# Patient Record
Sex: MALE | Race: WHITE | NOT HISPANIC OR LATINO | Employment: OTHER | URBAN - METROPOLITAN AREA
[De-identification: names, ages, dates, MRNs, and addresses within clinical notes are randomized per-mention and may not be internally consistent; named-entity substitution may affect disease eponyms.]

---

## 2017-01-16 ENCOUNTER — ALLSCRIPTS OFFICE VISIT (OUTPATIENT)
Dept: OTHER | Facility: OTHER | Age: 47
End: 2017-01-16

## 2017-01-17 ENCOUNTER — GENERIC CONVERSION - ENCOUNTER (OUTPATIENT)
Dept: OTHER | Facility: OTHER | Age: 47
End: 2017-01-17

## 2017-04-24 ENCOUNTER — GENERIC CONVERSION - ENCOUNTER (OUTPATIENT)
Dept: OTHER | Facility: OTHER | Age: 47
End: 2017-04-24

## 2017-06-06 LAB — HBA1C MFR BLD HPLC: 6 % (ref 4.8–5.6)

## 2017-06-23 ENCOUNTER — ALLSCRIPTS OFFICE VISIT (OUTPATIENT)
Dept: OTHER | Facility: OTHER | Age: 47
End: 2017-06-23

## 2017-06-23 ENCOUNTER — HOSPITAL ENCOUNTER (OUTPATIENT)
Dept: RADIOLOGY | Facility: HOSPITAL | Age: 47
Discharge: HOME | End: 2017-06-23
Payer: COMMERCIAL

## 2017-06-23 ENCOUNTER — TRANSCRIBE ORDERS (OUTPATIENT)
Dept: ADMINISTRATIVE | Facility: HOSPITAL | Age: 47
End: 2017-06-23

## 2017-06-23 DIAGNOSIS — M25.561 PAIN IN RIGHT KNEE: ICD-10-CM

## 2017-06-23 PROCEDURE — 73564 X-RAY EXAM KNEE 4 OR MORE: CPT

## 2017-07-25 ENCOUNTER — GENERIC CONVERSION - ENCOUNTER (OUTPATIENT)
Dept: OTHER | Facility: OTHER | Age: 47
End: 2017-07-25

## 2017-07-27 ENCOUNTER — GENERIC CONVERSION - ENCOUNTER (OUTPATIENT)
Dept: OTHER | Facility: OTHER | Age: 47
End: 2017-07-27

## 2017-07-27 LAB
A/G RATIO (HISTORICAL): 2 (ref 1.2–2.2)
ALBUMIN SERPL BCP-MCNC: 4.6 G/DL (ref 3.5–5.5)
ALP SERPL-CCNC: 63 IU/L (ref 39–117)
ALT SERPL W P-5'-P-CCNC: 35 IU/L (ref 0–44)
AST SERPL W P-5'-P-CCNC: 24 IU/L (ref 0–40)
BASOPHILS # BLD AUTO: 0 %
BASOPHILS # BLD AUTO: 0 X10E3/UL (ref 0–0.2)
BILIRUB SERPL-MCNC: 1 MG/DL (ref 0–1.2)
BUN SERPL-MCNC: 16 MG/DL (ref 6–24)
BUN/CREA RATIO (HISTORICAL): 16 (ref 9–20)
CALCIUM SERPL-MCNC: 9.6 MG/DL (ref 8.7–10.2)
CHLORIDE SERPL-SCNC: 97 MMOL/L (ref 96–106)
CHOLEST SERPL-MCNC: 227 MG/DL (ref 100–199)
CHOLEST/HDLC SERPL: 5.2 RATIO UNITS (ref 0–5)
CO2 SERPL-SCNC: 25 MMOL/L (ref 18–29)
CREAT SERPL-MCNC: 1.02 MG/DL (ref 0.76–1.27)
DEPRECATED RDW RBC AUTO: 13.6 % (ref 12.3–15.4)
EGFR AFRICAN AMERICAN (HISTORICAL): 101 ML/MIN/1.73
EGFR-AMERICAN CALC (HISTORICAL): 87 ML/MIN/1.73
EOSINOPHIL # BLD AUTO: 0.1 X10E3/UL (ref 0–0.4)
EOSINOPHIL # BLD AUTO: 1 %
GLUCOSE SERPL-MCNC: 121 MG/DL (ref 65–99)
HCT VFR BLD AUTO: 43.1 % (ref 37.5–51)
HDLC SERPL-MCNC: 44 MG/DL
HGB BLD-MCNC: 14.7 G/DL (ref 12.6–17.7)
IMM.GRANULOCYTES (CD4/8) (HISTORICAL): 0.1 X10E3/UL (ref 0–0.1)
IMM.GRANULOCYTES (CD4/8) (HISTORICAL): 1 %
LDLC SERPL CALC-MCNC: 122 MG/DL (ref 0–99)
LYMPHOCYTES # BLD AUTO: 2 X10E3/UL (ref 0.7–3.1)
LYMPHOCYTES # BLD AUTO: 41 %
MCH RBC QN AUTO: 29.7 PG (ref 26.6–33)
MCHC RBC AUTO-ENTMCNC: 34.1 G/DL (ref 31.5–35.7)
MCV RBC AUTO: 87 FL (ref 79–97)
MONOCYTES # BLD AUTO: 0.4 X10E3/UL (ref 0.1–0.9)
MONOCYTES (HISTORICAL): 8 %
NEUTROPHILS # BLD AUTO: 2.4 X10E3/UL (ref 1.4–7)
NEUTROPHILS # BLD AUTO: 49 %
PLATELET # BLD AUTO: 235 X10E3/UL (ref 150–379)
POTASSIUM SERPL-SCNC: 4.9 MMOL/L (ref 3.5–5.2)
PROSTATE SPECIFIC ANTIGEN (HISTORICAL): 2.2 NG/ML (ref 0–4)
RBC (HISTORICAL): 4.95 X10E6/UL (ref 4.14–5.8)
SODIUM SERPL-SCNC: 136 MMOL/L (ref 134–144)
TOT. GLOBULIN, SERUM (HISTORICAL): 2.3 G/DL (ref 1.5–4.5)
TOTAL PROTEIN (HISTORICAL): 6.9 G/DL (ref 6–8.5)
TRIGL SERPL-MCNC: 305 MG/DL (ref 0–149)
VLDLC SERPL CALC-MCNC: 61 MG/DL (ref 5–40)
WBC # BLD AUTO: 4.9 X10E3/UL (ref 3.4–10.8)

## 2017-07-29 LAB
LYME 18 KD IGG (HISTORICAL): NORMAL
LYME 23 KD IGG (HISTORICAL): NORMAL
LYME 23 KD IGM (HISTORICAL): NORMAL
LYME 28 KD IGG (HISTORICAL): NORMAL
LYME 30 KD IGG (HISTORICAL): NORMAL
LYME 39 KD IGG (HISTORICAL): NORMAL
LYME 39 KD IGM (HISTORICAL): NORMAL
LYME 41 KD IGG (HISTORICAL): NORMAL
LYME 41 KD IGM (HISTORICAL): NORMAL
LYME 45 KD IGG (HISTORICAL): NORMAL
LYME 58 KD IGG (HISTORICAL): NORMAL
LYME 66 KD IGG (HISTORICAL): NORMAL
LYME 93 KD IGG (HISTORICAL): NORMAL
LYME IGG WB INTERP. (HISTORICAL): NEGATIVE
LYME IGM WB INTERP. (HISTORICAL): NEGATIVE

## 2017-08-07 ENCOUNTER — ALLSCRIPTS OFFICE VISIT (OUTPATIENT)
Dept: OTHER | Facility: OTHER | Age: 47
End: 2017-08-07

## 2017-12-05 ENCOUNTER — ALLSCRIPTS OFFICE VISIT (OUTPATIENT)
Dept: OTHER | Facility: OTHER | Age: 47
End: 2017-12-05

## 2017-12-07 NOTE — PROGRESS NOTES
Assessment    1  Acute URI (465 9) (J06 9)    Plan      A/P 1  URI: This is viral  We are prescribing prednisone for the cough  and a cough medicine to help you sleep  This has codeine in it  Please take before bed  Prednisone will be 5 tabs the first day, 5 the second, 4,4,3,3,2,2,1,1  Please call if this isn't getting better  Be sure to use good hand hygiene  rto/prn   Chief Complaint  pt  presents in the office today due to a sinus infection  due - 10/2017      History of Present Illness  HPI: Patient presents today with complaint of a cough starting three days ago  He states that these symptoms have gotten worse over the past two days with a feeling of sinus tightness, mild SOB, headache, wheezing  He states that his cough is worse at night before bed and in the morning when he first wakes up  He has been treating with Nyquil which was only effective the first night and Dayquil  fevers, chills, muscle aches, earache      Review of Systems   Constitutional: as noted in HPI   ENT: no complaints of earache, no loss of hearing, no nosebleeds or nasal discharge, no sore throat or hoarseness,-- no earache,-- no sore throat-- and-- no nasal discharge  Respiratory: cough, but-- as noted in HPI  Active Problems  1  Adjustment disorder with anxiety (309 24) (F43 22)   2  Allergic rhinitis (477 9) (J30 9)   3  Amblyopia (368 00) (H53 009)   4  Back pain (724 5) (M54 9)   5  Chronic pain of right knee (339 87,296 32) (M25 561,G89 29)   6  Cough (786 2) (R05)   7  Essential hypertension (401 9) (I10)   8  History of esophageal reflux (V12 79) (Z87 19)   9  Hyperlipidemia (272 4) (E78 5)   10  Knee pain (719 46) (M25 569)   11  Left cervical radiculopathy (723 4) (M54 12)   12  Neck pain (723 1) (M54 2)   13  Need for prophylactic vaccination and inoculation against influenza (V04 81) (Z23)   14  Osseous stenosis of neural canal of cervical region (723 0) (M99 31)   15  Prediabetes (790 29) (R73 03)   16   Vitamin D deficiency (268 9) (E55 9)    Past Medical History  1  History of Cellulitis (682 9) (L03 90)   2  History of Cellulitis of neck (682 1) (L03 221)   3  History of Facial cellulitis (682 0) (L03 211)   4  History of acute sinusitis (V12 69) (Z87 09)   5  History of anxiety disorder (V11 8) (Z86 59)   6  History of bronchitis (V12 69) (Z87 09)   7  History of carbuncle of skin and subcutaneous tissue (V13 3) (Z87 2)   8  History of hypercholesterolemia (V12 29) (Z86 39)   9  History of sinusitis (V12 69) (Z87 09)   10  History of Lymph Nodes Enlarged (785 6)   11  History of Neck pain (723 1) (M54 2)   12  Need for prophylactic vaccination and inoculation against influenza (V04 81) (Z23)   13  History of Open Wound Of The Upper Extremity (884 0)   14  History of Sensory Disturbances    Family History  Mother    1  Denied: Family history of drug abuse   2  Family history of Hypertension (V17 49)   3  Denied: Family history of Mental health problem   4  Family history of Pure Hypercholesterolemia  Father    5  Family history of Coronary Artery Disease (V17 49)   6  Denied: Family history of drug abuse   7  Denied: Family history of Mental health problem  Son    8  Family history of Leukemia (V16 6)  Brother    5  Family history of Acute Myocardial Infarction (V17 3)  Family History Reviewed: The family history was reviewed and updated today  Social History   · Always uses seat belt   · Being A Social Drinker   · 2 BEERS A DAY   · Caffeine Use   · 4 cups of coffee qd   · Denied: History of Drug Use   · Has smoke detectors   · Never a smoker   · Denied: History of Tobacco Use  The social history was reviewed and updated today  The social history was reviewed and is unchanged  Surgical History    1  Denied: History Of Prior Surgery    Current Meds   1  Aleve 220 MG Oral Tablet; Therapy: (Recorded:44Efx2017) to Recorded   2  Lisinopril 20 MG Oral Tablet; take 1 tablet by mouth every day;  Therapy: 70Amj9093 to (21 )  Requested for: 80Ifl6409; Last Rx:04See0972 Ordered   3  Simvastatin 10 MG Oral Tablet; TAKE 1 TABLET EVERY DAY AT BEDTIME; Therapy: 13GQP9899 to (21 [phone])  Requested for: 52Mkj4976; Last Rx:42Lgc7554 Ordered    Allergies  1  Penicillins  2  No Known Environmental Allergies   3  No Known Food Allergies    Vitals   Recorded: 68MEZ9242 11:17AM   Temperature 97 8 F   Heart Rate 60   Respiration 12   Systolic 021   Diastolic 80   Height 5 ft 11 in   Weight 232 lb    BMI Calculated 32 36   BSA Calculated 2 25       Physical Exam   Constitutional  General appearance: No acute distress, well appearing and well nourished  Ears, Nose, Mouth, and Throat  External inspection of ears and nose: Normal    Otoscopic examination: Tympanic membrance translucent with normal light reflex  Canals patent without erythema  Nasal mucosa, septum, and turbinates: Abnormal  -- nasal turbinates and mucosa erythematous bilaterally  Oropharynx: Normal with no erythema, edema, exudate or lesions  Pulmonary  Respiratory effort: No increased work of breathing or signs of respiratory distress  Auscultation of lungs: Clear to auscultation, equal breath sounds bilaterally, no wheezes, no rales, no rhonci  Cardiovascular  Auscultation of heart: Normal rate and rhythm, normal S1 and S2, without murmurs  Carotid pulses: Normal    Musculoskeletal  Gait and station: Normal    Psychiatric  Orientation to person, place and time: Normal    Mood and affect: Normal          Results/Data  PHQ-2 Adult Depression Screening 11XHI1294 11:21AM User, Ahs     Test Name Result Flag Reference   PHQ-2 Adult Depression Score 0       Over the last two weeks, how often have you been bothered by any of the following problems?  Little interest or pleasure in doing things: Not at all - 0 Feeling down, depressed, or hopeless: Not at all - 0   PHQ-2 Adult Depression Screening Negative         Future Appointments    Date/Time Provider Specialty Site   02/12/2018 07:30 AM Zhou Cortes, [address] [place] [address]       Signatures   Electronically signed by : CHICHI Blanco;  Dec  5 2017  1:30PM EST                       (Author)    Electronically signed by : Love Ballesteros DO; Dec  6 2017  9:19PM EST

## 2018-01-11 ENCOUNTER — HOSPITAL ENCOUNTER (OUTPATIENT)
Dept: RADIOLOGY | Facility: HOSPITAL | Age: 48
Discharge: HOME | End: 2018-01-11
Payer: COMMERCIAL

## 2018-01-11 ENCOUNTER — TRANSCRIBE ORDERS (OUTPATIENT)
Dept: ADMINISTRATIVE | Facility: HOSPITAL | Age: 48
End: 2018-01-11

## 2018-01-11 ENCOUNTER — ALLSCRIPTS OFFICE VISIT (OUTPATIENT)
Dept: OTHER | Facility: OTHER | Age: 48
End: 2018-01-11

## 2018-01-11 DIAGNOSIS — R05.9 COUGH: ICD-10-CM

## 2018-01-11 PROCEDURE — 71046 X-RAY EXAM CHEST 2 VIEWS: CPT

## 2018-01-11 PROCEDURE — 70220 X-RAY EXAM OF SINUSES: CPT

## 2018-01-12 VITALS
HEART RATE: 84 BPM | SYSTOLIC BLOOD PRESSURE: 122 MMHG | RESPIRATION RATE: 14 BRPM | DIASTOLIC BLOOD PRESSURE: 72 MMHG | HEIGHT: 70 IN | BODY MASS INDEX: 32.16 KG/M2 | WEIGHT: 224.6 LBS

## 2018-01-12 VITALS
RESPIRATION RATE: 12 BRPM | DIASTOLIC BLOOD PRESSURE: 66 MMHG | SYSTOLIC BLOOD PRESSURE: 104 MMHG | HEIGHT: 71 IN | HEART RATE: 60 BPM | BODY MASS INDEX: 32.23 KG/M2 | WEIGHT: 230.25 LBS

## 2018-01-13 VITALS
DIASTOLIC BLOOD PRESSURE: 84 MMHG | HEART RATE: 66 BPM | BODY MASS INDEX: 31.13 KG/M2 | RESPIRATION RATE: 16 BRPM | SYSTOLIC BLOOD PRESSURE: 126 MMHG | WEIGHT: 222.38 LBS | HEIGHT: 71 IN

## 2018-01-13 NOTE — PROGRESS NOTES
Assessment   1  Cough (786 2) (R05)    Plan   Cough    · PredniSONE 10 MG Oral Tablet; take 5 tabs today once with food, 5 tomorrow then    4,4,3,3 2,2,1,1   · ProAir  (90 Base) MCG/ACT Inhalation Aerosol Solution; 2 PUFF(S) 3    TIMES DAILY AS NEEDED FOR 30 DAYS   · * CT CHEST WO CONTRAST; Status:Need Information - Financial Authorization; Requested [id number];    · * XR SINUSES ROUTINE 3+ VIEWS; Status:Active; Requested [id number];    · MINI NEBULIZER- POC; Status:Resulted - Requires Verification,Retrospective By    Protocol Authorization;   Done: 63VGY1880 03:11PM        A/P     1 persistent cough: please get the chest and sinus xray and  I will call you with the result' in the meantime start the prednisone and use the inhaler for this  call me if this is not getting better  rto prn       Chief Complaint   pt  presents in the office today due to coughing   due - 10/2017      History of Present Illness   HPI: started on thanksgiving with a cough  was treated with a zpak due to exposure to pertussus  is still getting spasms of coughing  so hard he throws up   not really interrupt his sleep /  some wheezes in his lung  on a course of prednisone which helped at first but then no help   like he just is not getting better  Review of Systems        Constitutional: no fever or chills, feels well, no tiredness, no recent weight loss or weight gain  ENT: no complaints of earache, no loss of hearing, no nosebleeds or nasal discharge, no sore throat or hoarseness  Cardiovascular: no complaints of slow or fast heart rate, no chest pain, no palpitations, no leg claudication or lower extremity edema  Respiratory: as noted in HPI  Musculoskeletal: no complaints of arthralgia, no myalgia, no joint swelling or stiffness, no limb pain or swelling  Integumentary: no complaints of skin rash or lesion, no itching or dry skin, no skin wounds  Active Problems   1  Acute URI (465 9) (J06 9)   2  Adjustment disorder with anxiety (309 24) (F43 22)   3  Allergic rhinitis (477 9) (J30 9)   4  Amblyopia (368 00) (H53 009)   5  Back pain (724 5) (M54 9)   6  Chronic pain of right knee (464 77,615 94) (M25 561,G89 29)   7  Cough (786 2) (R05)   8  Essential hypertension (401 9) (I10)   9  Exposure to pertussis (V01 89) (Z20 818)   10  History of esophageal reflux (V12 79) (Z87 19)   11  Hyperlipidemia (272 4) (E78 5)   12  Knee pain (719 46) (M25 569)   13  Left cervical radiculopathy (723 4) (M54 12)   14  Neck pain (723 1) (M54 2)   15  Need for prophylactic vaccination and inoculation against influenza (V04 81) (Z23)   16  Osseous stenosis of neural canal of cervical region (723 0) (M99 31)   17  Prediabetes (790 29) (R73 03)   18  Vitamin D deficiency (268 9) (E55 9)    Past Medical History   1  History of Cellulitis (682 9) (L03 90)   2  History of Cellulitis of neck (682 1) (L03 221)   3  History of Facial cellulitis (682 0) (L03 211)   4  History of acute sinusitis (V12 69) (Z87 09)   5  History of anxiety disorder (V11 8) (Z86 59)   6  History of bronchitis (V12 69) (Z87 09)   7  History of carbuncle of skin and subcutaneous tissue (V13 3) (Z87 2)   8  History of hypercholesterolemia (V12 29) (Z86 39)   9  History of sinusitis (V12 69) (Z87 09)   10  History of Lymph Nodes Enlarged (785 6)   11  History of Neck pain (723 1) (M54 2)   12  Need for prophylactic vaccination and inoculation against influenza (V04 81) (Z23)   13  History of Open Wound Of The Upper Extremity (884 0)   14  History of Sensory Disturbances    Family History   Mother    1  Denied: Family history of drug abuse   2  Family history of Hypertension (V17 49)   3  Denied: Family history of Mental health problem   4  Family history of Pure Hypercholesterolemia  Father    5  Family history of Coronary Artery Disease (V17 49)   6  Denied: Family history of drug abuse   7   Denied: Family history of Mental health problem  Son    8  Family history of Leukemia (V16 6)  Brother    5  Family history of Acute Myocardial Infarction (V17 3)  Family History Reviewed: The family history was reviewed and updated today  Social History    · Always uses seat belt   · Being A Social Drinker   · 2 BEERS A DAY   · Caffeine Use   · 4 cups of coffee qd   · Denied: History of Drug Use   · Has smoke detectors   · Never a smoker   · Denied: History of Tobacco Use  The social history was reviewed and updated today  The social history was reviewed and is unchanged  Surgical History   1  Denied: History Of Prior Surgery    Current Meds    1  Aleve 220 MG Oral Tablet; Therapy: (Recorded:63Fgr1457) to Recorded   2  Lisinopril 20 MG Oral Tablet; take 1 tablet by mouth every day; Therapy: 31Tvo1282 to (Evaluate:42Pqp3655)  Requested for: 86Hdp9235; Last     Rx:36Mif5537 Ordered   3  Simvastatin 10 MG Oral Tablet; TAKE 1 TABLET EVERY DAY AT BEDTIME; Therapy: 66JCR8357 to (21 )  Requested for: 51Dna6178; Last     Rx:54Hyw8726 Ordered    Allergies   1  Penicillins  2  No Known Environmental Allergies   3  No Known Food Allergies    Vitals    Recorded: 11IDR5300 02:50PM   Heart Rate 100   Respiration 16   Systolic 156   Diastolic 90   Height 5 ft 11 in   Weight 231 lb    BMI Calculated 32 22   BSA Calculated 2 24     Physical Exam        Constitutional      General appearance: No acute distress, well appearing and well nourished  Ears, Nose, Mouth, and Throat      Otoscopic examination: Tympanic membrance translucent with normal light reflex  Canals patent without erythema  Nasal mucosa, septum, and turbinates: Normal without edema or erythema  Oropharynx: Normal with no erythema, edema, exudate or lesions  Pulmonary      Auscultation of lungs: Abnormal  -- hypoaerated but CTA  Cardiovascular      Auscultation of heart: Normal rate and rhythm, normal S1 and S2, without murmurs  Lymphatic      Palpation of lymph nodes in neck: No lymphadenopathy  Skin      Skin and subcutaneous tissue: Normal without rashes or lesions         Psychiatric      Orientation to person, place and time: Normal        Mood and affect: Normal           Results/Data   MINI NEBULIZER- POC 19PAU9712 03:11PM Pj Alert      Test Name Result Flag Reference   MiniNebulizer [id number]                      Future Appointments      Date/Time Provider Specialty Site   02/12/2018 07:30 AM Pj Alert, [address] [facility]     Signatures    Electronically signed by : Jhonny Maldonado [facility]; Jan 11 2018  3:22PM EST                       (Author)     Electronically signed by : Katelynn Carroll DO; Jan 12 2018  2:52PM EST

## 2018-01-15 NOTE — MISCELLANEOUS
Message   Recorded as Task   Date: 05/27/2016 10:27 AM, Created By: Sharon Cope   Task Name: Call Back   Assigned To: Sukhdeepbenjamín Hall   Regarding Patient: Anish Mckeon, Status: Active   CommentWiledo Lambjudy - 27 May 2016 10:27 AM     TASK CREATED  Caller: Self; [phone] (Home); [phone] (Work)  pt asking for chest xray results  his cough has gotten worse  pt # [phone]   Daiva Dancer - 27 May 2016 12:55 PM     TASK EDITED  pt called again and requesting antibiotic if needed Khurram Mitchell - 27 May 2016 1:07 PM     TASK EDITED  insurance wants prior auth for nexium   Augustin Ortiz - 27 May 2016 2:20 PM     TASK REPLIED TO: Previously Assigned To Jim Madrigal was sent in place of the Nexium  He should follow the same directions and take it nightly for the next month until I see him    I do not have his chest or sinus x-ray  As per very detailed instructions I gave to him yesterday, he gets no antibiotic unless there is a sinus infection or pneumonia  Sooner he gets those done sooner we will know the answer  I am sorry but this is going to take some time to work out  The body has his own schedule as far as a responding to different modalities  Khurram Boykin - 27 May 2016 2:41 PM     TASK REPLIED TO: Previously Assigned To Daiva Dancer  pt informed  he's upset bc he said that he thinks we are going to call monday and tell him there is an infection and now he has to go the weekend without an antibiotic  he said he has been suffering for a month and this is the first time a test is ordered  I went in portal and he did get the xray done but there are no results  it's "in processrtmurtaza Flores - 27 May 2016 2:47 PM     TASK REPLIED TO: Previously Assigned To Augustin Ortiz  Please call radiology directly and asked them the timeline for this being read  If it is going to be read by tomorrow then I could call the patient with the results     Daiva Dancer - 27 May 2016 2:57 PM TASK REPLIED TO: Previously Assigned To Kavita Lassiter in radiology at [address] said she will let the radiologist know we are looking for results so they should have them for tomorrow morning   Augustin Ortiz - 27 May 2016 3:04 PM     TASK REPLIED TO: Previously Assigned To Susan Means  [place] surprise! Patient's chest x-ray and sinus x-ray are are finished   He will be happy to know that they are totally normal and he DOES NOT NEED antibiotics   Khurram Boykin - 27 May 2016 3:20 PM     TASK REPLIED TO: Previously Assigned To Kiran Black pt informed        Signatures   Electronically signed by : Tamy Hernandez DO; May 28 2016  8:55AM EST                       (Author)

## 2018-01-17 NOTE — MISCELLANEOUS
Message   Recorded as Task   Date: 06/06/2016 11:24 AM, Created By: Roderick Bowling   Task Name: Medical Complaint Callback   Assigned To: Anitha Fernandez   Regarding Patient: Anish Mckeon, Status: Active   Comment:    Sury Spangler - 06 Jun 2016 11:24 AM     TASK CREATED  Caller: aamir, Spouse; Medical Complaint  wants to talk to you about luis cough- still very bad, has been seen multiple times  [phone]   Anitha Fernandez - 06 Jun 2016 1:12 PM     TASK REPLIED TO: Previously Assigned To Anitha Fernandez  did she specifically ask to speak to me as he saw Marlon Vladez last  Sury Spangler - 06 Jun 2016 2:25 PM     TASK EDITED  yes   Herson Contreras - 06 Jun 2016 3:07 PM     TASK REPLIED TO: Previously Assigned To Anitha Fernandez  TC from wife: Redell Ear is still coughing   so disruptive that he is loosing his voice, can not sleep in the same room with him  she feels he is nasal as well but he does not agree  states that whenever he laughs he starts to cough and then cannot stop  PK FL last ov were still 60% of baseline  we will do prednisone again along with albuterol MDI and Flonase  Will call in one week if not improving  Plan    1  Fluticasone Propionate 50 MCG/ACT Nasal Suspension; USE 2 SPRAYS IN EACH   NOSTRIL ONCE DAILY   2  PredniSONE 10 MG Oral Tablet; take 5 tabs today once with food, 5 tomorrow then   4,4,3,3 2,2,1,1   3   ProAir  (90 Base) MCG/ACT Inhalation Aerosol Solution; INHALE 2 PUFFS   EVERY 4 HOURS AS NEEDED    Signatures   Electronically signed by : CHICHI Driver; Jun 6 2016  3:08PM EST                       (Author)

## 2018-01-22 VITALS
HEIGHT: 71 IN | TEMPERATURE: 97.8 F | WEIGHT: 232 LBS | RESPIRATION RATE: 12 BRPM | SYSTOLIC BLOOD PRESSURE: 120 MMHG | HEART RATE: 60 BPM | DIASTOLIC BLOOD PRESSURE: 80 MMHG | BODY MASS INDEX: 32.48 KG/M2

## 2018-01-22 VITALS
RESPIRATION RATE: 16 BRPM | DIASTOLIC BLOOD PRESSURE: 90 MMHG | HEART RATE: 100 BPM | HEIGHT: 71 IN | SYSTOLIC BLOOD PRESSURE: 140 MMHG | BODY MASS INDEX: 32.34 KG/M2 | WEIGHT: 231 LBS

## 2018-01-31 DIAGNOSIS — I10 ESSENTIAL HYPERTENSION: Primary | ICD-10-CM

## 2018-01-31 DIAGNOSIS — E78.5 HYPERLIPIDEMIA, UNSPECIFIED HYPERLIPIDEMIA TYPE: ICD-10-CM

## 2018-01-31 RX ORDER — SIMVASTATIN 10 MG
TABLET ORAL
Qty: 90 TABLET | Refills: 1 | Status: SHIPPED | OUTPATIENT
Start: 2018-01-31 | End: 2018-08-17 | Stop reason: SDUPTHER

## 2018-01-31 RX ORDER — LISINOPRIL 20 MG/1
TABLET ORAL
Qty: 90 TABLET | Refills: 1 | Status: SHIPPED | OUTPATIENT
Start: 2018-01-31 | End: 2018-08-17 | Stop reason: SDUPTHER

## 2018-03-28 ENCOUNTER — TELEPHONE (OUTPATIENT)
Dept: FAMILY MEDICINE CLINIC | Facility: CLINIC | Age: 48
End: 2018-03-28

## 2018-03-28 NOTE — TELEPHONE ENCOUNTER
Patient said he needs labs done from last visit   The orders are not in Epic or [employer], please enter order for labs you want done

## 2018-03-29 DIAGNOSIS — R73.03 PREDIABETES: Primary | ICD-10-CM

## 2018-03-29 DIAGNOSIS — E78.2 HYPERLIPEMIA, MIXED: ICD-10-CM

## 2018-03-29 DIAGNOSIS — I10 BENIGN ESSENTIAL HTN: ICD-10-CM

## 2018-04-12 LAB
ALBUMIN SERPL-MCNC: 4.4 G/DL (ref 3.5–5.5)
ALBUMIN/GLOB SERPL: 2.1 {RATIO} (ref 1.2–2.2)
ALP SERPL-CCNC: 63 IU/L (ref 39–117)
ALT SERPL-CCNC: 36 IU/L (ref 0–44)
AST SERPL-CCNC: 23 IU/L (ref 0–40)
BASOPHILS # BLD AUTO: 0 X10E3/UL (ref 0–0.2)
BASOPHILS NFR BLD AUTO: 1 %
BILIRUB SERPL-MCNC: 0.8 MG/DL (ref 0–1.2)
BUN SERPL-MCNC: 14 MG/DL (ref 6–24)
BUN/CREAT SERPL: 13 (ref 9–20)
CALCIUM SERPL-MCNC: 9.6 MG/DL (ref 8.7–10.2)
CHLORIDE SERPL-SCNC: 99 MMOL/L (ref 96–106)
CHOLEST SERPL-MCNC: 205 MG/DL (ref 100–199)
CHOLEST/HDLC SERPL: 5 RATIO (ref 0–5)
CO2 SERPL-SCNC: 27 MMOL/L (ref 18–29)
CREAT SERPL-MCNC: 1.08 MG/DL (ref 0.76–1.27)
EOSINOPHIL # BLD AUTO: 0.1 X10E3/UL (ref 0–0.4)
EOSINOPHIL NFR BLD AUTO: 2 %
ERYTHROCYTE [DISTWIDTH] IN BLOOD BY AUTOMATED COUNT: 13.4 % (ref 12.3–15.4)
EST. AVERAGE GLUCOSE BLD GHB EST-MCNC: 117 MG/DL
GLOBULIN SER-MCNC: 2.1 G/DL (ref 1.5–4.5)
GLUCOSE SERPL-MCNC: 114 MG/DL (ref 65–99)
HBA1C MFR BLD: 5.7 % (ref 4.8–5.6)
HCT VFR BLD AUTO: 43.2 % (ref 37.5–51)
HDLC SERPL-MCNC: 41 MG/DL
HGB BLD-MCNC: 14.7 G/DL (ref 13–17.7)
IMM GRANULOCYTES # BLD: 0 X10E3/UL (ref 0–0.1)
IMM GRANULOCYTES NFR BLD: 1 %
LDLC SERPL CALC-MCNC: ABNORMAL MG/DL (ref 0–99)
LDLC SERPL DIRECT ASSAY-MCNC: 113 MG/DL (ref 0–99)
LYMPHOCYTES # BLD AUTO: 2.6 X10E3/UL (ref 0.7–3.1)
LYMPHOCYTES NFR BLD AUTO: 42 %
MCH RBC QN AUTO: 29.9 PG (ref 26.6–33)
MCHC RBC AUTO-ENTMCNC: 34 G/DL (ref 31.5–35.7)
MCV RBC AUTO: 88 FL (ref 79–97)
MONOCYTES # BLD AUTO: 0.5 X10E3/UL (ref 0.1–0.9)
MONOCYTES NFR BLD AUTO: 7 %
NEUTROPHILS # BLD AUTO: 2.9 X10E3/UL (ref 1.4–7)
NEUTROPHILS NFR BLD AUTO: 47 %
PLATELET # BLD AUTO: 266 X10E3/UL (ref 150–379)
POTASSIUM SERPL-SCNC: 4.6 MMOL/L (ref 3.5–5.2)
PROT SERPL-MCNC: 6.5 G/DL (ref 6–8.5)
RBC # BLD AUTO: 4.92 X10E6/UL (ref 4.14–5.8)
SL AMB EGFR AFRICAN AMERICAN: 93 ML/MIN/1.73
SL AMB EGFR NON AFRICAN AMERICAN: 81 ML/MIN/1.73
SL AMB VLDL CHOLESTEROL CALC: ABNORMAL MG/DL (ref 5–40)
SODIUM SERPL-SCNC: 138 MMOL/L (ref 134–144)
TRIGL SERPL-MCNC: 421 MG/DL (ref 0–149)
WBC # BLD AUTO: 6.1 X10E3/UL (ref 3.4–10.8)

## 2018-04-13 ENCOUNTER — TELEPHONE (OUTPATIENT)
Dept: FAMILY MEDICINE CLINIC | Facility: CLINIC | Age: 48
End: 2018-04-13

## 2018-04-13 NOTE — TELEPHONE ENCOUNTER
Called pt and left VM     ----- Message from Inocencia Zelaya Dr sent at 4/13/2018 10:25 AM EDT -----  Call pt and let him know I got his labs and am waiting for his to schedule an appt to review with his Bp  thanks

## 2018-04-17 ENCOUNTER — OFFICE VISIT (OUTPATIENT)
Dept: FAMILY MEDICINE CLINIC | Facility: CLINIC | Age: 48
End: 2018-04-17
Payer: COMMERCIAL

## 2018-04-17 VITALS
HEART RATE: 93 BPM | WEIGHT: 232 LBS | SYSTOLIC BLOOD PRESSURE: 116 MMHG | HEIGHT: 71 IN | RESPIRATION RATE: 14 BRPM | DIASTOLIC BLOOD PRESSURE: 80 MMHG | BODY MASS INDEX: 32.48 KG/M2

## 2018-04-17 DIAGNOSIS — R06.00 DOE (DYSPNEA ON EXERTION): Primary | ICD-10-CM

## 2018-04-17 DIAGNOSIS — E78.2 HYPERLIPEMIA, MIXED: ICD-10-CM

## 2018-04-17 DIAGNOSIS — Z80.8: ICD-10-CM

## 2018-04-17 DIAGNOSIS — D22.5: ICD-10-CM

## 2018-04-17 PROBLEM — M25.561 CHRONIC PAIN OF RIGHT KNEE: Status: ACTIVE | Noted: 2017-06-23

## 2018-04-17 PROBLEM — G89.29 CHRONIC PAIN OF RIGHT KNEE: Status: ACTIVE | Noted: 2017-06-23

## 2018-04-17 PROCEDURE — 99214 OFFICE O/P EST MOD 30 MIN: CPT | Performed by: NURSE PRACTITIONER

## 2018-04-17 PROCEDURE — 3008F BODY MASS INDEX DOCD: CPT | Performed by: NURSE PRACTITIONER

## 2018-04-17 PROCEDURE — 11301 SHAVE SKIN LESION 0.6-1.0 CM: CPT | Performed by: NURSE PRACTITIONER

## 2018-04-17 RX ORDER — SIMVASTATIN 10 MG
1 TABLET ORAL
COMMUNITY
Start: 2015-12-08 | End: 2018-04-17 | Stop reason: ALTCHOICE

## 2018-04-17 RX ORDER — LISINOPRIL 20 MG/1
1 TABLET ORAL DAILY
COMMUNITY
Start: 2012-08-27 | End: 2018-04-17 | Stop reason: ALTCHOICE

## 2018-04-17 NOTE — PROGRESS NOTES
Shave lesion  Date/Time: 4/17/2018 12:45 PM  Performed by: Medina Stevenson  Authorized by: Medina Stevenson     Number of Lesions: 1  Lesion 1:     Body area: trunk    Trunk location: back    Initial size (mm): 5    Final defect size (mm): 8    Malignancy: malignancy unknown      Destruction method: shave removal      Comments:  Injected with 1%v lidocaine  Alcohol sterilization  Cautery hemostasis    bandaide with neosporin  Sent for pathology

## 2018-04-17 NOTE — PROGRESS NOTES
Chief Complaint   Patient presents with    Follow-up     go over bloodwork      Assessment/Plan:      1  FLORIAN (dyspnea on exertion)  We will send you for a stress test and we will call you with the results  If this is normal we will check PFT  - Stress test only, exercise; Future    2  Hyperlipemia, mixed  Please get rid of the juice and coffee creamers and we will charles this in 3 months   - Lipid panel; Future  - Lipid panel      3 Atypical nevus of right upper back excluding scapular region  See procedure  Note  We will call with the pathology results  - Pathology Report  - Shave Removal    rto 3 months  Subjective:      Patient ID: Kristy Troncoso is a 49 y o  male  Here today to charles on several chronic issues  Issues  Does drink coffee with coffee creamer and does drink beer on week end  On average drinks 12 pack a week  Does not eat a lot of sweets  Is consistent with his meds   Does also drink juice  Has concerns for a  Mole on his back   has been there for many months and is concerned that it is changing  In addition, has ongoing SOB with exertion  Knows that he has gained weight and is getting older and his job has become more sedentary  Finds that the slightest bit of exercise is causing him to get SOB  No chest pain   No sweating or nausea               The following portions of the patient's history were reviewed and updated as appropriate: allergies, current medications, past family history, past medical history, past social history, past surgical history and problem list     Past Medical History:   Diagnosis Date    Acute sinusitis 2011    Anxiety     Cellulitis 2011    Cellulitis of neck 2010    Facial cellulitis 2010    Hypercholesterolemia     Lymph nodes enlarged     Sensory disturbance      Past Surgical History:   Procedure Laterality Date    NO PAST SURGERIES       Family History   Problem Relation Age of Onset    Hypertension Mother     Hyperlipidemia Mother    Christian Avalos Coronary artery disease Father     Heart attack Brother      B/D 52 MI    Leukemia Brother     Leukemia Son     Mental illness Neg Hx     Substance Abuse Neg Hx      Social History   Social History     Social History    Marital status: /Civil Union     Spouse name: N/A    Number of children: N/A    Years of education: N/A     Occupational History    Not on file  Social History Main Topics    Smoking status: Never Smoker    Smokeless tobacco: Never Used      Comment: DENIED: HISTORY OF TOBACCO USE    Alcohol use 1 2 oz/week     2 Cans of beer per week      Comment: SOCIAL; 2 BEERS A DAY AS PER ALL SCRIPTS     Drug use: No    Sexual activity: Not on file     Other Topics Concern    Not on file     Social History Narrative    Always uses seat belt    Caffeine use: 4 cups of coffee qd    Has smoke detectors         Review of Systems   Respiratory: Positive for shortness of breath  Negative for chest tightness and wheezing  Cardiovascular: Negative for chest pain and palpitations  Skin:        Mole on back    Psychiatric/Behavioral: Negative  Vitals:    04/17/18 1059   BP: 116/80   BP Location: Left arm   Patient Position: Sitting   Pulse: 93   Resp: 14   Weight: 105 kg (232 lb)   Height: 5' 11" (1 803 m)       Objective:  Orders Only on 03/29/2018   Component Date Value Ref Range Status    Cholesterol, Total 04/11/2018 205* 100 - 199 mg/dL Final    Triglycerides 04/11/2018 421* 0 - 149 mg/dL Final    SL AMB HDL CHOLESTEROL 04/11/2018 41  >39 mg/dL Final    SL AMB VLDL CHOLESTEROL CALC 04/11/2018 Comment  5 - 40 mg/dL Final    Comment: The calculation for the VLDL cholesterol is not valid when  triglyceride level is >400 mg/dL   SL AMB LDL-CHOLESTEROL 04/11/2018 Comment  0 - 99 mg/dL Final    Comment: Triglyceride result indicated is too high for an accurate LDL  cholesterol estimation   T   Chol/HDL Ratio 04/11/2018 5 0  0 0 - 5 0 ratio Final    Comment: T  Chol/HDL Ratio                                              Men  Women                                1/2 Avg  Risk  3 4    3 3                                    Avg Risk  5 0    4 4                                 2X Avg  Risk  9 6    7 1                                 3X Avg  Risk 23 4   11 0      Hemoglobin A1C 04/11/2018 5 7* 4 8 - 5 6 % Final    Comment:          Pre-diabetes: 5 7 - 6 4           Diabetes: >6 4           Glycemic control for adults with diabetes: <7 0      Estimated Average Glucose 04/11/2018 117  mg/dL Final    SL AMB GLUCOSE 04/11/2018 114* 65 - 99 mg/dL Final    BUN 04/11/2018 14  6 - 24 mg/dL Final    Creatinine, Serum 04/11/2018 1 08  0 76 - 1 27 mg/dL Final    eGFR Non African American 04/11/2018 81  >59 mL/min/1 73 Final    SL AMB EGFR  04/11/2018 93  >59 mL/min/1 73 Final    SL AMB BUN/CREATININE RATIO 04/11/2018 13  9 - 20 Final    SL AMB SODIUM 04/11/2018 138  134 - 144 mmol/L Final    SL AMB POTASSIUM 04/11/2018 4 6  3 5 - 5 2 mmol/L Final    SL AMB CHLORIDE 04/11/2018 99  96 - 106 mmol/L Final    SL AMB CARBON DIOXIDE 04/11/2018 27  18 - 29 mmol/L Final    CALCIUM 04/11/2018 9 6  8 7 - 10 2 mg/dL Final    SL AMB PROTEIN, TOTAL 04/11/2018 6 5  6 0 - 8 5 g/dL Final    Serum Albumin 04/11/2018 4 4  3 5 - 5 5 g/dL Final    Globulin, Total 04/11/2018 2 1  1 5 - 4 5 g/dL Final    SL AMB ALBUMIN/GLOBULIN RATIO 04/11/2018 2 1  1 2 - 2 2 Final    SL AMB BILIRUBIN, TOTAL 04/11/2018 0 8  0 0 - 1 2 mg/dL Final    Alk Phos Isoenzymes 04/11/2018 63  39 - 117 IU/L Final    SL AMB AST 04/11/2018 23  0 - 40 IU/L Final    SL AMB ALT 04/11/2018 36  0 - 44 IU/L Final    SL AMB LAB WHITE BLOOD CELL COUNT 04/11/2018 6 1  3 4 - 10 8 x10E3/uL Final    SL AMB LAB RED BLOOD CELLS 04/11/2018 4 92  4 14 - 5 80 x10E6/uL Final    Hemoglobin 04/11/2018 14 7  13 0 - 17 7 g/dL Final    Hematocrit 04/11/2018 43 2  37 5 - 51 0 % Final    MCV 04/11/2018 88  79 - 97 fL Final    MCH 04/11/2018 29 9  26 6 - 33 0 pg Final    MCHC 04/11/2018 34 0  31 5 - 35 7 g/dL Final    RDW 04/11/2018 13 4  12 3 - 15 4 % Final    Platelet Count 82/32/0582 266  150 - 379 x10E3/uL Final    Neutrophils 04/11/2018 47  Not Estab  % Final    Lymphocytes 04/11/2018 42  Not Estab  % Final    Monocytes 04/11/2018 7  Not Estab  % Final    Eosinophils 04/11/2018 2  Not Estab  % Final    Basophils 04/11/2018 1  Not Estab  % Final    Neutrophils (Absolute) 04/11/2018 2 9  1 4 - 7 0 x10E3/uL Final    Lymphocytes (Absolute) 04/11/2018 2 6  0 7 - 3 1 x10E3/uL Final    Monocytes (Absolute) 04/11/2018 0 5  0 1 - 0 9 x10E3/uL Final    Eosinophils (Absolute) 04/11/2018 0 1  0 0 - 0 4 x10E3/uL Final    Basophils (Absolute) 04/11/2018 0 0  0 0 - 0 2 x10E3/uL Final    IMM  GRANULOCYTES (CD4/8) 04/11/2018 1  Not Estab  % Final    IIMM  GRANS,ABS (CD4/8) 04/11/2018 0 0  0 0 - 0 1 x10E3/uL Final    LDL Cholesterol 04/11/2018 113* 0 - 99 mg/dL Final   Generic Conversion - Encounter on 07/27/2017   Component Date Value Ref Range Status    LYME IGG WB INTER  07/27/2017 Negative   Final    Comment: Result Comment: Positive: 5 of the following                                                Borrelia-specific bands:                                                18,23,28,30,39,41,45,58,                                                66, and 93  Negative: No bands or banding                                                patterns which do not                                                meet positive criteria   LYME IGM WB INTER  07/27/2017 Negative   Final    Comment: Result Comment: Note: An equivocal or positive EIA result followed by a negative  Western Blot result is considered NEGATIVE  An equivocal or positive  EIA result followed by a positive Western Blot is considered POSITIVE  by the CDC    Positive: 2 of the following bands: 23,39 or 41  Negative: No bands or banding patterns which do not meet positive  criteria  Criteria for positivity are those recommended by CDC/ASTPHLD   p23=Osp C, p30=vvhbydngb  Note:  Sera from individuals with the following may cross react in the  Lyme Western Blot assays: other spirochetal diseases (periodontal  disease, leptospirosis, relapsing fever, yaws, and pinta);  connective autoimmune (Rheumatoid Arthritis and Systemic Lupus  Erythematosus and also individuals with Antinuclear Antibody);  other infections COFFEE [facility] Spotted Fever; Soheila-Barr Virus,  and Cytomegalovirus)   LYME 18 KD IGG 07/27/2017 Absent   Final    LYME 23 KD IGG 07/27/2017 Absent   Final    LYME 28 KD IGG 07/27/2017 Absent   Final    LYME 30 KD IGG 07/27/2017 Absent   Final    LYME 39 KD IGG 07/27/2017 Absent   Final    LYME 41 KD IGG 07/27/2017 Absent   Final    LYME 45 KD IGG 07/27/2017 Absent   Final    LYME 58 KD IGG 07/27/2017 Absent   Final    LYME 66 KD IGG 07/27/2017 Absent   Final    LYME 93 KD IGG 07/27/2017 Absent   Final    LYME 23 KD IGM 07/27/2017 Absent   Final    LYME 39 KD IGM 07/27/2017 Absent   Final    LYME 41 KD IGM 07/27/2017 Absent   Final    Comment:   Performed at: [facility] , , [place], [place], [id number], [id number]  MD:  [address] [phone]     Generic Conversion - Encounter on 07/25/2017   Component Date Value Ref Range Status    PROSTATE SPECIFIC ANTIGEN 07/27/2017 2 2  0 0 - 4 0 ng/mL Final    Comment: Result Comment: Roche ECLIA methodology  According to the American Urological Association, Serum PSA should  decrease and remain at undetectable levels after radical  prostatectomy  The AUA defines biochemical recurrence as an initial  PSA value 0 2 ng/mL or greater followed by a subsequent confirmatory  PSA value 0 2 ng/mL or greater  Values obtained with different assay methods or kits cannot be used  interchangeably   Results cannot be interpreted as absolute evidence  of the presence or absence of malignant disease  Performed at: [facility] , , [place], [place], [id number], [id number]  MD:  [address] [phone]     Generic Conversion - Encounter on 04/24/2017   Component Date Value Ref Range Status    Glucose 07/27/2017 121* 65 - 99 mg/dL Final    BUN 07/27/2017 16  6 - 24 mg/dL Final    Creatinine 07/27/2017 1 02  0 76 - 1 27 mg/dL Final    BUN/CREA Ratio 07/27/2017 16  9 - 20 Final    Sodium 07/27/2017 136  134 - 144 mmol/L Final    Potassium 07/27/2017 4 9  3 5 - 5 2 mmol/L Final    Chloride 07/27/2017 97  96 - 106 mmol/L Final    CO2 07/27/2017 25  18 - 29 mmol/L Final    Calcium 07/27/2017 9 6  8 7 - 10 2 mg/dL Final    Total Protein 07/27/2017 6 9  6 0 - 8 5 g/dL Final    Albumin 07/27/2017 4 6  3 5 - 5 5 g/dL Final    Tot   Globulin, Serum 07/27/2017 2 3  1 5 - 4 5 g/dL Final    A/G RATIO 07/27/2017 2 0  1 2 - 2 2 Final    Total Bilirubin 07/27/2017 1 0  0 0 - 1 2 mg/dL Final    Alkaline Phosphatase 07/27/2017 63  39 - 117 IU/L Final    AST 07/27/2017 24  0 - 40 IU/L Final    ALT 07/27/2017 35  0 - 44 IU/L Final    EGFR-AMERICAN CALC 07/27/2017 87  >59 mL/min/1 73 Final    eGFR African American 07/27/2017 101  >59 mL/min/1 73 Final    Comment: Performed at: [facility], , [place], [place], [id number], [id number]  MD:  [address] [phone]      WBC 07/27/2017 4 9  3 4 - 10 8 x10E3/uL Final    RBC 07/27/2017 4 95  4 14 - 5 80 x10E6/uL Final    Hemoglobin 07/27/2017 14 7  12 6 - 17 7 g/dL Final    Hematocrit 07/27/2017 43 1  37 5 - 51 0 % Final    MCV 07/27/2017 87  79 - 97 fL Final    MCH 07/27/2017 29 7  26 6 - 33 0 pg Final    MCHC 07/27/2017 34 1  31 5 - 35 7 g/dL Final    RDW 07/27/2017 13 6  12 3 - 15 4 % Final    Platelets 49/28/7514 235  150 - 379 x10E3/uL Final    Neutrophils Absolute 07/27/2017 49  % Final    Lymphocytes Absolute 07/27/2017 41  % Final  MONOCYTES 07/27/2017 8  % Final    Eosinophils Absolute 07/27/2017 1  % Final    Basophils Absolute 07/27/2017 0  % Final    Neutrophils Absolute 07/27/2017 2 4  1 4 - 7 0 x10E3/uL Final    Lymphocytes Absolute 07/27/2017 2 0  0 7 - 3 1 x10E3/uL Final    Monocytes Absolute 07/27/2017 0 4  0 1 - 0 9 x10E3/uL Final    Eosinophils Absolute 07/27/2017 0 1  0 0 - 0 4 x10E3/uL Final    Basophils Absolute 07/27/2017 0 0  0 0 - 0 2 x10E3/uL Final    IMM  GRANULOCYTES (CD4/8) 07/27/2017 1  % Final    IMM  GRANULOCYTES (CD4/8) 07/27/2017 0 1  0 0 - 0 1 x10E3/uL Final    Comment: Performed at: [facility], , [place], [place], [id number], [id number]  MD:  [phone] [facility] [phone]      Cholesterol 07/27/2017 227* 100 - 199 mg/dL Final    Triglycerides 07/27/2017 305* 0 - 149 mg/dL Final    HDL 07/27/2017 44  >39 mg/dL Final    VLDL Cholesterol Dae 07/27/2017 61* 5 - 40 mg/dL Final    LDL Calculated 07/27/2017 122* 0 - 99 mg/dL Final    Chol/HDL Ratio 07/27/2017 5 2* 0 0 - 5 0 ratio units Final    Comment: Result Comment: T  Chol/HDL Ratio                                                             Men  Women                                               1/2 Avg  Risk  3 4    3 3                                                   Avg Risk  5 0    4 4                                                2X Avg  Risk  9 6    7 1                                                3X Avg  Risk 23 4   11 0  Performed at: [facility], , [place], [place], [id number], [id number]  MD:  [facility] Chiu  [place], [place] [phone]     [facility] Office Visit on 01/16/2017   Component Date Value Ref Range Status    Hemoglobin A1C 06/05/2017 6 0* 4 8 - 5 6 % Final    Comment: Result Comment: Pre-diabetes: 5 7 - 6 4           Diabetes: >6 4           Glycemic control for adults with diabetes: <7 0  Performed at: [facility], , [place], [place], [id number], [id number]  MD:  [address] [facility] [phone]     [employer] Office Visit on 10/11/2016   Component Date Value Ref Range Status    Hemoglobin A1C 01/11/2017 5 9* 4 8 - 5 6 % Final    Comment: Result Comment: Pre-diabetes: 5 7 - 6 4           Diabetes: >6 4           Glycemic control for adults with diabetes: <7 0  Performed at: [facility], , [place], [place], [id number], [id number]  MD:  Enoc Chiu  [place], [place] [phone]     Generic Conversion - Encounter on 05/28/2016   Component Date Value Ref Range Status    WBC 10/04/2016 5 8  3 4 - 10 8 x10E3/uL Final    RBC 10/04/2016 4 91  4 14 - 5 80 x10E6/uL Final    Hemoglobin 10/04/2016 14 5  12 6 - 17 7 g/dL Final    Hematocrit 10/04/2016 42 9  37 5 - 51 0 % Final    MCV 10/04/2016 87  79 - 97 fL Final    MCH 10/04/2016 29 5  26 6 - 33 0 pg Final    MCHC 10/04/2016 33 8  31 5 - 35 7 g/dL Final    RDW 10/04/2016 13 6  12 3 - 15 4 % Final    Platelets 41/16/4623 232  150 - 379 x10E3/uL Final    Neutrophils Absolute 10/04/2016 48  % Final    Lymphocytes Absolute 10/04/2016 41  % Final    MONOCYTES 10/04/2016 9  % Final    Eosinophils Absolute 10/04/2016 2  % Final    Basophils Absolute 10/04/2016 0  % Final    Neutrophils Absolute 10/04/2016 2 8  1 4 - 7 0 x10E3/uL Final    Lymphocytes Absolute 10/04/2016 2 4  0 7 - 3 1 x10E3/uL Final    Monocytes Absolute 10/04/2016 0 5  0 1 - 0 9 x10E3/uL Final    Eosinophils Absolute 10/04/2016 0 1  0 0 - 0 4 x10E3/uL Final    Basophils Absolute 10/04/2016 0 0  0 0 - 0 2 x10E3/uL Final    IMM  GRANULOCYTES (CD4/8) 10/04/2016 0  % Final    IMM  GRANULOCYTES (CD4/8) 10/04/2016 0 0  0 0 - 0 1 x10E3/uL Final    Comment: Performed at: [facility], , [place], [place], [id number], [id number]  MD:  [phone] [phone]      Vit D, 25-Hydroxy 10/04/2016 30 5  30 0 - 100 0 ng/mL Final    Comment: Result Comment: Vitamin D deficiency has been defined by the [place] of  Medicine and an Endocrine Society practice guideline as a  level of serum 25-OH vitamin D less than 20 ng/mL (1,2)  The Endocrine Society went on to further define vitamin D  insufficiency as a level between 21 and 29 ng/mL (2)  1  IOM ([place] of Medicine)  2010  Dietary reference     intakes for calcium and D  [facility]: The     [employer]  2  Eran MF, Chris NC, Sarai SILVER, et al      Evaluation, treatment, and prevention of vitamin D     deficiency: an Endocrine Society clinical practice     guideline  JCEM  2011 Jul; 96(7):1911-30    Performed at: [facility] , , [place], [place], [id number], [id number]  MD:  [address] [facility] [phone]      Hemoglobin A1C 10/04/2016 5 8* 4 8 - 5 6 % Final    Comment: Result Comment: Pre-diabetes: 5 7 - 6 4           Diabetes: >6 4           Glycemic control for adults with diabetes: <7 0  Performed at: [facility] , , [place], [place], [id number], [id number]  MD:  [address] [phone]      Glucose 10/04/2016 115* 65 - 99 mg/dL Final    BUN 10/04/2016 13  6 - 24 mg/dL Final    Creatinine 10/04/2016 1 03  0 76 - 1 27 mg/dL Final    BUN/CREA Ratio 10/04/2016 13  9 - 20 Final    Sodium 10/04/2016 138  134 - 144 mmol/L Final    Comment: Result Comment: **Effective October 17, 2016 the reference interval**                   for Sodium, Serum will be changing to:                                                             136 - 144      Potassium 10/04/2016 4 6  3 5 - 5 2 mmol/L Final    Comment: Result Comment: **Effective October 17, 2016 the reference interval**                   for Potassium, Serum will be changing to:                                          0 -  7 days        3 7 - 5 2                                          8 - 30 days        3 7 - 6 4                                          1 -  6 months      3 8 - 6 0                                   7 months -  1 year        3 8 - 5 3 >1 year        3 5 - 5 2      Chloride 10/04/2016 99  97 - 108 mmol/L Final    Comment: Result Comment: **Effective October 17, 2016 the reference interval**                   for Chloride, Serum will be changing to:                                                              97 - 106      CO2 10/04/2016 26  18 - 29 mmol/L Final    Calcium 10/04/2016 9 3  8 7 - 10 2 mg/dL Final    Total Protein 10/04/2016 6 6  6 0 - 8 5 g/dL Final    Albumin 10/04/2016 4 4  3 5 - 5 5 g/dL Final    Tot  Globulin, Serum 10/04/2016 2 2  1 5 - 4 5 g/dL Final    A/G RATIO 10/04/2016 2 0  1 1 - 2 5 Final    Total Bilirubin 10/04/2016 0 9  0 0 - 1 2 mg/dL Final    Alkaline Phosphatase 10/04/2016 56  39 - 117 IU/L Final    AST 10/04/2016 19  0 - 40 IU/L Final    ALT 10/04/2016 22  0 - 44 IU/L Final    EGFR-AMERICAN CALC 10/04/2016 87  >59 mL/min/1 73 Final    eGFR African American 10/04/2016 100  >59 mL/min/1 73 Final    Comment: Performed at: [place] [id number], , [place], [place], [id number], [id number]  MD:  [phone] [phone]      Cholesterol 10/04/2016 199  100 - 199 mg/dL Final    Triglycerides 10/04/2016 332* 0 - 149 mg/dL Final    HDL 10/04/2016 41  >39 mg/dL Final    Comment: Result Comment: According to ATP-III Guidelines, HDL-C >59 mg/dL is considered a  negative risk factor for CHD   VLDL Cholesterol Dae 10/04/2016 66* 5 - 40 mg/dL Final    LDL Calculated 10/04/2016 92  0 - 99 mg/dL Final    Chol/HDL Ratio 10/04/2016 4 9  0 0 - 5 0 ratio units Final    Comment: Result Comment: T  Chol/HDL Ratio                                                             Men  Women                                               1/2 Avg  Risk  3 4    3 3                                                   Avg Risk  5 0    4 4                                                2X Avg  Risk  9 6    7 1                                                3X Avg  Risk 23 4 11 0  Performed at: [facility], , [place], [place], [id number], [id number]  MD:  Enoc Chiu  [place], [place] [phone]            Physical Exam   Constitutional: He is oriented to person, place, and time  He appears well-developed and well-nourished  Neck: Normal range of motion  Neck supple  Carotid bruit is not present  Cardiovascular: Normal rate, regular rhythm and normal heart sounds  Pulmonary/Chest: Effort normal and breath sounds normal  No respiratory distress  Neurological: He is alert and oriented to person, place, and time  Skin: Skin is warm and dry  No erythema  R upper back with 5 mm raised rough lesion  Mariia Booze in color

## 2018-04-23 LAB
PATH REPORT.SITE OF ORIGIN SPEC: NORMAL
PAYMENT PROCEDURE: NORMAL
SL AMB .: NORMAL

## 2018-04-24 ENCOUNTER — TELEPHONE (OUTPATIENT)
Dept: FAMILY MEDICINE CLINIC | Facility: CLINIC | Age: 48
End: 2018-04-24

## 2018-04-24 ENCOUNTER — HOSPITAL ENCOUNTER (OUTPATIENT)
Dept: NON INVASIVE DIAGNOSTICS | Facility: CLINIC | Age: 48
Discharge: HOME | End: 2018-04-24
Payer: COMMERCIAL

## 2018-04-24 DIAGNOSIS — R06.00 DOE (DYSPNEA ON EXERTION): ICD-10-CM

## 2018-04-24 PROCEDURE — 93018 CV STRESS TEST I&R ONLY: CPT | Performed by: INTERNAL MEDICINE

## 2018-04-24 PROCEDURE — 93017 CV STRESS TEST TRACING ONLY: CPT

## 2018-04-24 PROCEDURE — 93016 CV STRESS TEST SUPVJ ONLY: CPT | Performed by: INTERNAL MEDICINE

## 2018-04-24 NOTE — TELEPHONE ENCOUNTER
Left VM     ----- Message from Inocencia Zelaya Dr sent at 4/24/2018 12:52 PM EDT -----  Call pt and let him know that his stress test was normal

## 2018-04-24 NOTE — TELEPHONE ENCOUNTER
----- Message from Inocencia Zelaya Dr sent at 4/23/2018 11:45 AM EDT -----  Call pt and let him know that the lesion removed was benign

## 2018-04-25 LAB
CHEST PAIN STATEMENT: NORMAL
MAX DIASTOLIC BP: 88 MMHG
MAX HEART RATE: 176 BPM
MAX PREDICTED HEART RATE: 172 BPM
MAX. SYSTOLIC BP: 208 MMHG
PROTOCOL NAME: NORMAL
TARGET HR FORMULA: NORMAL
TEST INDICATION: NORMAL
TIME IN EXERCISE PHASE: NORMAL

## 2018-06-13 ENCOUNTER — TELEPHONE (OUTPATIENT)
Dept: FAMILY MEDICINE CLINIC | Facility: CLINIC | Age: 48
End: 2018-06-13

## 2018-06-13 DIAGNOSIS — Z20.818 EXPOSURE TO PERTUSSIS: Primary | ICD-10-CM

## 2018-06-13 RX ORDER — AZITHROMYCIN 250 MG/1
TABLET, FILM COATED ORAL
Qty: 6 TABLET | Refills: 0 | Status: SHIPPED | OUTPATIENT
Start: 2018-06-13 | End: 2018-06-17

## 2018-08-17 DIAGNOSIS — I10 ESSENTIAL HYPERTENSION: ICD-10-CM

## 2018-08-17 DIAGNOSIS — E78.5 HYPERLIPIDEMIA, UNSPECIFIED HYPERLIPIDEMIA TYPE: ICD-10-CM

## 2018-08-17 RX ORDER — LISINOPRIL 20 MG/1
TABLET ORAL
Qty: 90 TABLET | Refills: 1 | Status: SHIPPED | OUTPATIENT
Start: 2018-08-17 | End: 2018-09-14 | Stop reason: SINTOL

## 2018-08-17 RX ORDER — SIMVASTATIN 10 MG
TABLET ORAL
Qty: 90 TABLET | Refills: 1 | Status: SHIPPED | OUTPATIENT
Start: 2018-08-17 | End: 2019-02-06 | Stop reason: SDUPTHER

## 2018-09-14 ENCOUNTER — TELEPHONE (OUTPATIENT)
Dept: FAMILY MEDICINE CLINIC | Facility: CLINIC | Age: 48
End: 2018-09-14

## 2018-09-14 DIAGNOSIS — I10 BENIGN ESSENTIAL HTN: Primary | ICD-10-CM

## 2018-09-14 RX ORDER — LOSARTAN POTASSIUM 50 MG/1
50 TABLET ORAL DAILY
Qty: 30 TABLET | Refills: 5 | Status: SHIPPED | OUTPATIENT
Start: 2018-09-14 | End: 2019-03-09 | Stop reason: SDUPTHER

## 2018-09-14 NOTE — TELEPHONE ENCOUNTER
Patient went to the allergist for a coght he's had for a long time and they told him to get off the Lisinopril, this is what is causing his cough   Patient is asking for a new BP med      [facility]      [phone]

## 2018-10-24 ENCOUNTER — TELEPHONE (OUTPATIENT)
Dept: FAMILY MEDICINE CLINIC | Facility: CLINIC | Age: 48
End: 2018-10-24

## 2018-10-24 NOTE — TELEPHONE ENCOUNTER
Patient wants to come in for his recheck on his bp med change but he thinks he needs a bunch of labs done  If yes can you enter them in the chart?

## 2018-10-25 DIAGNOSIS — E55.9 VITAMIN D DEFICIENCY: Primary | ICD-10-CM

## 2018-10-25 DIAGNOSIS — I10 ESSENTIAL HYPERTENSION: ICD-10-CM

## 2018-10-25 DIAGNOSIS — R73.03 PREDIABETES: ICD-10-CM

## 2018-10-25 DIAGNOSIS — E78.5 HYPERLIPIDEMIA, UNSPECIFIED HYPERLIPIDEMIA TYPE: ICD-10-CM

## 2018-11-18 LAB
25(OH)D3+25(OH)D2 SERPL-MCNC: 32.9 NG/ML (ref 30–100)
ALBUMIN SERPL-MCNC: 4.5 G/DL (ref 3.5–5.5)
ALBUMIN/GLOB SERPL: 2 {RATIO} (ref 1.2–2.2)
ALP SERPL-CCNC: 59 IU/L (ref 39–117)
ALT SERPL-CCNC: 26 IU/L (ref 0–44)
AST SERPL-CCNC: 20 IU/L (ref 0–40)
BASOPHILS # BLD AUTO: 0 X10E3/UL (ref 0–0.2)
BASOPHILS NFR BLD AUTO: 0 %
BILIRUB SERPL-MCNC: 1 MG/DL (ref 0–1.2)
BUN SERPL-MCNC: 14 MG/DL (ref 6–24)
BUN/CREAT SERPL: 13 (ref 9–20)
CALCIUM SERPL-MCNC: 9.5 MG/DL (ref 8.7–10.2)
CHLORIDE SERPL-SCNC: 101 MMOL/L (ref 96–106)
CHOLEST SERPL-MCNC: 183 MG/DL (ref 100–199)
CHOLEST/HDLC SERPL: 4.2 RATIO (ref 0–5)
CO2 SERPL-SCNC: 24 MMOL/L (ref 20–29)
CREAT SERPL-MCNC: 1.11 MG/DL (ref 0.76–1.27)
EOSINOPHIL # BLD AUTO: 0.1 X10E3/UL (ref 0–0.4)
EOSINOPHIL NFR BLD AUTO: 2 %
ERYTHROCYTE [DISTWIDTH] IN BLOOD BY AUTOMATED COUNT: 13.1 % (ref 12.3–15.4)
EST. AVERAGE GLUCOSE BLD GHB EST-MCNC: 117 MG/DL
GLOBULIN SER-MCNC: 2.3 G/DL (ref 1.5–4.5)
GLUCOSE SERPL-MCNC: 114 MG/DL (ref 65–99)
HBA1C MFR BLD: 5.7 % (ref 4.8–5.6)
HCT VFR BLD AUTO: 43.4 % (ref 37.5–51)
HDLC SERPL-MCNC: 44 MG/DL
HGB BLD-MCNC: 14.9 G/DL (ref 13–17.7)
IMM GRANULOCYTES # BLD: 0 X10E3/UL (ref 0–0.1)
IMM GRANULOCYTES NFR BLD: 0 %
LDLC SERPL CALC-MCNC: 104 MG/DL (ref 0–99)
LYMPHOCYTES # BLD AUTO: 2.6 X10E3/UL (ref 0.7–3.1)
LYMPHOCYTES NFR BLD AUTO: 47 %
MCH RBC QN AUTO: 29.7 PG (ref 26.6–33)
MCHC RBC AUTO-ENTMCNC: 34.3 G/DL (ref 31.5–35.7)
MCV RBC AUTO: 87 FL (ref 79–97)
MONOCYTES # BLD AUTO: 0.4 X10E3/UL (ref 0.1–0.9)
MONOCYTES NFR BLD AUTO: 7 %
NEUTROPHILS # BLD AUTO: 2.4 X10E3/UL (ref 1.4–7)
NEUTROPHILS NFR BLD AUTO: 44 %
PLATELET # BLD AUTO: 256 X10E3/UL (ref 150–379)
POTASSIUM SERPL-SCNC: 4.9 MMOL/L (ref 3.5–5.2)
PROT SERPL-MCNC: 6.8 G/DL (ref 6–8.5)
RBC # BLD AUTO: 5.01 X10E6/UL (ref 4.14–5.8)
SL AMB EGFR AFRICAN AMERICAN: 90 ML/MIN/1.73
SL AMB EGFR NON AFRICAN AMERICAN: 78 ML/MIN/1.73
SL AMB T4, FREE (DIRECT): 1.2 NG/DL (ref 0.82–1.77)
SL AMB VLDL CHOLESTEROL CALC: 35 MG/DL (ref 5–40)
SODIUM SERPL-SCNC: 138 MMOL/L (ref 134–144)
TRIGL SERPL-MCNC: 173 MG/DL (ref 0–149)
TSH SERPL DL<=0.005 MIU/L-ACNC: 5.66 UIU/ML (ref 0.45–4.5)
WBC # BLD AUTO: 5.4 X10E3/UL (ref 3.4–10.8)

## 2018-11-29 ENCOUNTER — OFFICE VISIT (OUTPATIENT)
Dept: FAMILY MEDICINE CLINIC | Facility: CLINIC | Age: 48
End: 2018-11-29
Payer: COMMERCIAL

## 2018-11-29 VITALS
HEIGHT: 71 IN | BODY MASS INDEX: 31.92 KG/M2 | HEART RATE: 88 BPM | SYSTOLIC BLOOD PRESSURE: 118 MMHG | DIASTOLIC BLOOD PRESSURE: 64 MMHG | WEIGHT: 228 LBS | RESPIRATION RATE: 14 BRPM

## 2018-11-29 DIAGNOSIS — F41.9 ANXIETY: ICD-10-CM

## 2018-11-29 DIAGNOSIS — R73.03 PREDIABETES: ICD-10-CM

## 2018-11-29 DIAGNOSIS — E78.5 HYPERLIPIDEMIA, UNSPECIFIED HYPERLIPIDEMIA TYPE: ICD-10-CM

## 2018-11-29 DIAGNOSIS — E55.9 VITAMIN D DEFICIENCY: ICD-10-CM

## 2018-11-29 DIAGNOSIS — I10 ESSENTIAL HYPERTENSION: ICD-10-CM

## 2018-11-29 DIAGNOSIS — S16.1XXA STRAIN OF NECK MUSCLE, INITIAL ENCOUNTER: Primary | ICD-10-CM

## 2018-11-29 PROCEDURE — 3008F BODY MASS INDEX DOCD: CPT | Performed by: NURSE PRACTITIONER

## 2018-11-29 PROCEDURE — 3078F DIAST BP <80 MM HG: CPT | Performed by: NURSE PRACTITIONER

## 2018-11-29 PROCEDURE — 3074F SYST BP LT 130 MM HG: CPT | Performed by: NURSE PRACTITIONER

## 2018-11-29 PROCEDURE — 99214 OFFICE O/P EST MOD 30 MIN: CPT | Performed by: NURSE PRACTITIONER

## 2018-11-29 PROCEDURE — 1036F TOBACCO NON-USER: CPT | Performed by: NURSE PRACTITIONER

## 2018-11-29 RX ORDER — ORPHENADRINE CITRATE 100 MG/1
100 TABLET, EXTENDED RELEASE ORAL 2 TIMES DAILY
Qty: 60 TABLET | Refills: 0 | Status: SHIPPED | OUTPATIENT
Start: 2018-11-29 | End: 2018-12-26 | Stop reason: SDUPTHER

## 2018-11-29 RX ORDER — BUSPIRONE HYDROCHLORIDE 15 MG/1
15 TABLET ORAL 2 TIMES DAILY
Qty: 60 TABLET | Refills: 5 | Status: SHIPPED | OUTPATIENT
Start: 2018-11-29 | End: 2018-12-04 | Stop reason: SDUPTHER

## 2018-11-29 NOTE — PROGRESS NOTES
Chief Complaint   Patient presents with    Follow-up    Hyperlipidemia    Hypertension     Assessment/Plan:  1  Strain of neck muscle, initial encounter  please take the muscle relaxer as prescribed  Consider phy therapy if not improving    - orphenadrine (NORFLEX) 100 mg tablet; Take 1 tablet (100 mg total) by mouth 2 (two) times a day  Dispense: 60 tablet; Refill: 0  - Lipid panel; Future  - Comprehensive metabolic panel; Future  - CBC and differential; Future  - TSH, 3rd generation with Free T4 reflex; Future  - Lipid panel  - Comprehensive metabolic panel  - CBC and differential  - TSH, 3rd generation with Free T4 reflex    2  Anxiety  We will have you take the Buspar  We dicussed how to gradually increase this   Call if you are not feeling better or if you have any questions about this  - busPIRone (BUSPAR) 15 mg tablet; Take 1 tablet (15 mg total) by mouth 2 (two) times a day  Dispense: 60 tablet; Refill: 5  - Lipid panel; Future  - Comprehensive metabolic panel; Future  - CBC and differential; Future  - TSH, 3rd generation with Free T4 reflex; Future  - Lipid panel  - Comprehensive metabolic panel  - CBC and differential  - TSH, 3rd generation with Free T4 reflex    3  Vitamin D deficiency  You are taking and OTC supplement but are unsure of the dosage    - Lipid panel; Future  - Comprehensive metabolic panel; Future  - CBC and differential; Future  - TSH, 3rd generation with Free T4 reflex; Future  - Lipid panel  - Comprehensive metabolic panel  - CBC and differential  - TSH, 3rd generation with Free T4 reflex    4  Prediabetes  Your HA1C dropped to 5 7 from 6 1 and has remained stable at this number  Good job  - Lipid panel; Future  - Comprehensive metabolic panel; Future  - CBC and differential; Future  - TSH, 3rd generation with Free T4 reflex; Future  - Lipid panel  - Comprehensive metabolic panel  - CBC and differential  - TSH, 3rd generation with Free T4 reflex    5   Hyperlipidemia, unspecified hyperlipidemia type  You have lowered your trigs from 400 to 170 by cutting out the coffee creamer and improving your diet  Good job  - Lipid panel; Future  - Comprehensive metabolic panel; Future  - CBC and differential; Future  - TSH, 3rd generation with Free T4 reflex; Future  - Lipid panel  - Comprehensive metabolic panel  - CBC and differential  - TSH, 3rd generation with Free T4 reflex    6  Essential hypertension  This is good with the losartin and your cough was eliminated with stopping the ace    - Lipid panel; Future  - Comprehensive metabolic panel; Future  - CBC and differential; Future  - TSH, 3rd generation with Free T4 reflex; Future  - Lipid panel  - Comprehensive metabolic panel  - CBC and differential  - TSH, 3rd generation with Free T4 reflex    rto 6 months with labs prior  Call if you have any questions  Subjective:      Patient ID: Rajinder President is a 51 y o  male  Here today to charles on several chronic issues  States that his cough resolved with the change from lisinopril to losartin    Has issues with muscle spasms and achiness in his neck and shoulders and requests refill on his muscle relaxers    Still struggling with anxiety  Did do well with the lexapro but found that the side effects were not tolerable and is requesting trying something different  Has cut way back on his carbs and coffee creamer  and is pleased to see the improvement in his trigs               The following portions of the patient's history were reviewed and updated as appropriate: allergies, current medications, past family history, past medical history, past social history, past surgical history and problem list     Past Medical History:   Diagnosis Date    Acute sinusitis 2011    Anxiety     Cellulitis 2011    Cellulitis of neck 2010    Facial cellulitis 2010    Hypercholesterolemia     Lymph nodes enlarged     Sensory disturbance      Past Surgical History:   Procedure Laterality Date    NO PAST SURGERIES       Family History   Problem Relation Age of Onset    Hypertension Mother     Hyperlipidemia Mother     Coronary artery disease Father     Heart attack Brother         B/D 52 MI    Leukemia Brother     Leukemia Son     Mental illness Neg Hx     Substance Abuse Neg Hx      Social History   Social History     Social History    Marital status: /Civil Union     Spouse name: N/A    Number of children: N/A    Years of education: N/A     Occupational History    Not on file  Social History Main Topics    Smoking status: Never Smoker    Smokeless tobacco: Never Used      Comment: DENIED: HISTORY OF TOBACCO USE    Alcohol use 1 2 oz/week     2 Cans of beer per week      Comment: SOCIAL; 2 BEERS A DAY AS PER ALL SCRIPTS     Drug use: No    Sexual activity: Not on file     Other Topics Concern    Not on file     Social History Narrative    Always uses seat belt    Caffeine use: 4 cups of coffee qd    Has smoke detectors         Review of Systems   Constitutional: Negative  Respiratory: Negative  Cardiovascular: Negative  Musculoskeletal: Positive for neck pain  Skin: Negative  Neurological: Negative  Psychiatric/Behavioral: The patient is nervous/anxious  Vitals:    11/29/18 1523   BP: 118/64   BP Location: Left arm   Patient Position: Sitting   Pulse: 88   Resp: 14   Weight: 103 kg (228 lb)   Height: 5' 10 75" (1 797 m)       Objective: Wt Readings from Last 3 Encounters:   11/29/18 103 kg (228 lb)   04/17/18 105 kg (232 lb)   01/11/18 105 kg (231 lb)     BP Readings from Last 3 Encounters:   11/29/18 118/64   04/17/18 116/80   01/11/18 140/90     Pulse Readings from Last 3 Encounters:   11/29/18 88   04/17/18 93   01/11/18 100     BMI Readings from Last 3 Encounters:   11/29/18 32 02 kg/m²   04/17/18 32 36 kg/m²   01/11/18 32 22 kg/m²            Physical Exam   Constitutional: He is oriented to person, place, and time   He appears well-developed and well-nourished  HENT:   Right Ear: Tympanic membrane and ear canal normal    Left Ear: Tympanic membrane and ear canal normal    Neck: Normal range of motion  Neck supple  Cardiovascular: Normal rate, regular rhythm, normal heart sounds and intact distal pulses  Exam reveals no decreased pulses  Pulmonary/Chest: Effort normal and breath sounds normal  No respiratory distress  He has no wheezes  He has no rales  Musculoskeletal: Normal range of motion  Neurological: He is alert and oriented to person, place, and time  He has normal reflexes  Skin: Skin is warm and dry  Psychiatric: He has a normal mood and affect  His behavior is normal  Judgment and thought content normal    Good eye contact and insight  Good perspective and judgement

## 2018-12-04 ENCOUNTER — TELEPHONE (OUTPATIENT)
Dept: FAMILY MEDICINE CLINIC | Facility: CLINIC | Age: 48
End: 2018-12-04

## 2018-12-04 DIAGNOSIS — F41.9 ANXIETY: ICD-10-CM

## 2018-12-04 RX ORDER — BUSPIRONE HYDROCHLORIDE 15 MG/1
15 TABLET ORAL 2 TIMES DAILY
Qty: 60 TABLET | Refills: 0 | Status: SHIPPED | OUTPATIENT
Start: 2018-12-04 | End: 2019-01-14 | Stop reason: SDUPTHER

## 2018-12-04 NOTE — TELEPHONE ENCOUNTER
Yasmany called  He states that [facility] advised him that the Buspar is on back order and that he should try a different pharmacy  They advised to get it at [facility] in [place] so he is asking if you will send in a new RX to [facility] for this medication

## 2018-12-26 DIAGNOSIS — S16.1XXA STRAIN OF NECK MUSCLE, INITIAL ENCOUNTER: ICD-10-CM

## 2018-12-27 RX ORDER — ORPHENADRINE CITRATE 100 MG/1
TABLET, EXTENDED RELEASE ORAL
Qty: 60 TABLET | Refills: 0 | Status: SHIPPED | OUTPATIENT
Start: 2018-12-27 | End: 2019-01-29 | Stop reason: SDUPTHER

## 2019-01-14 ENCOUNTER — TELEPHONE (OUTPATIENT)
Dept: FAMILY MEDICINE CLINIC | Facility: CLINIC | Age: 49
End: 2019-01-14

## 2019-01-14 DIAGNOSIS — F41.9 ANXIETY: ICD-10-CM

## 2019-01-14 RX ORDER — BUSPIRONE HYDROCHLORIDE 15 MG/1
15 TABLET ORAL 2 TIMES DAILY
Qty: 60 TABLET | Refills: 1 | Status: SHIPPED | OUTPATIENT
Start: 2019-01-14 | End: 2019-03-17 | Stop reason: SDUPTHER

## 2019-01-14 NOTE — TELEPHONE ENCOUNTER
Ricardo Gallagher gave patient a script for buspar but did not get any refills  He has only 2 pills left  Can you refill this for him?

## 2019-01-29 DIAGNOSIS — S16.1XXA STRAIN OF NECK MUSCLE, INITIAL ENCOUNTER: ICD-10-CM

## 2019-01-30 RX ORDER — ORPHENADRINE CITRATE 100 MG/1
TABLET, EXTENDED RELEASE ORAL
Qty: 60 TABLET | Refills: 0 | Status: SHIPPED | OUTPATIENT
Start: 2019-01-30 | End: 2019-03-07 | Stop reason: SDUPTHER

## 2019-02-06 DIAGNOSIS — E78.5 HYPERLIPIDEMIA, UNSPECIFIED HYPERLIPIDEMIA TYPE: ICD-10-CM

## 2019-02-07 RX ORDER — SIMVASTATIN 10 MG
TABLET ORAL
Qty: 90 TABLET | Refills: 1 | Status: SHIPPED | OUTPATIENT
Start: 2019-02-07 | End: 2019-08-07 | Stop reason: SDUPTHER

## 2019-03-07 DIAGNOSIS — S16.1XXA STRAIN OF NECK MUSCLE, INITIAL ENCOUNTER: ICD-10-CM

## 2019-03-07 RX ORDER — ORPHENADRINE CITRATE 100 MG/1
TABLET, EXTENDED RELEASE ORAL
Qty: 60 TABLET | Refills: 0 | Status: SHIPPED | OUTPATIENT
Start: 2019-03-07 | End: 2019-04-29 | Stop reason: SDUPTHER

## 2019-03-09 DIAGNOSIS — I10 BENIGN ESSENTIAL HTN: ICD-10-CM

## 2019-03-09 RX ORDER — LOSARTAN POTASSIUM 50 MG/1
TABLET ORAL
Qty: 30 TABLET | Refills: 5 | Status: SHIPPED | OUTPATIENT
Start: 2019-03-09 | End: 2019-09-11 | Stop reason: SDUPTHER

## 2019-03-17 DIAGNOSIS — F41.9 ANXIETY: ICD-10-CM

## 2019-03-18 RX ORDER — BUSPIRONE HYDROCHLORIDE 15 MG/1
TABLET ORAL
Qty: 60 TABLET | Refills: 1 | Status: SHIPPED | OUTPATIENT
Start: 2019-03-18 | End: 2019-04-30 | Stop reason: ALTCHOICE

## 2019-04-02 ENCOUNTER — TELEPHONE (OUTPATIENT)
Dept: FAMILY MEDICINE CLINIC | Facility: CLINIC | Age: 49
End: 2019-04-02

## 2019-04-29 DIAGNOSIS — S16.1XXA STRAIN OF NECK MUSCLE, INITIAL ENCOUNTER: ICD-10-CM

## 2019-04-29 RX ORDER — ORPHENADRINE CITRATE 100 MG/1
TABLET, EXTENDED RELEASE ORAL
Qty: 60 TABLET | Refills: 0 | Status: SHIPPED | OUTPATIENT
Start: 2019-04-29 | End: 2019-05-26 | Stop reason: SDUPTHER

## 2019-04-30 ENCOUNTER — OFFICE VISIT (OUTPATIENT)
Dept: FAMILY MEDICINE CLINIC | Facility: CLINIC | Age: 49
End: 2019-04-30
Payer: COMMERCIAL

## 2019-04-30 VITALS
WEIGHT: 239.8 LBS | TEMPERATURE: 98.2 F | DIASTOLIC BLOOD PRESSURE: 82 MMHG | RESPIRATION RATE: 17 BRPM | BODY MASS INDEX: 33.57 KG/M2 | SYSTOLIC BLOOD PRESSURE: 120 MMHG | HEART RATE: 76 BPM | HEIGHT: 71 IN

## 2019-04-30 DIAGNOSIS — H01.001 BLEPHARITIS OF RIGHT UPPER EYELID, UNSPECIFIED TYPE: Primary | ICD-10-CM

## 2019-04-30 PROCEDURE — 99213 OFFICE O/P EST LOW 20 MIN: CPT | Performed by: PHYSICIAN ASSISTANT

## 2019-04-30 RX ORDER — ERYTHROMYCIN 5 MG/G
0.5 OINTMENT OPHTHALMIC EVERY 6 HOURS SCHEDULED
Qty: 3.5 G | Refills: 0 | Status: SHIPPED | OUTPATIENT
Start: 2019-04-30 | End: 2019-05-07

## 2019-05-01 ENCOUNTER — TELEPHONE (OUTPATIENT)
Dept: FAMILY MEDICINE CLINIC | Facility: CLINIC | Age: 49
End: 2019-05-01

## 2019-05-01 DIAGNOSIS — H01.001 BLEPHARITIS OF RIGHT UPPER EYELID, UNSPECIFIED TYPE: Primary | ICD-10-CM

## 2019-05-01 RX ORDER — CEPHALEXIN 500 MG/1
1000 CAPSULE ORAL EVERY 12 HOURS SCHEDULED
Qty: 28 CAPSULE | Refills: 0 | Status: SHIPPED | OUTPATIENT
Start: 2019-05-01 | End: 2019-05-08

## 2019-05-26 DIAGNOSIS — S16.1XXA STRAIN OF NECK MUSCLE, INITIAL ENCOUNTER: ICD-10-CM

## 2019-05-28 RX ORDER — ORPHENADRINE CITRATE 100 MG/1
TABLET, EXTENDED RELEASE ORAL
Qty: 60 TABLET | Refills: 0 | Status: SHIPPED | OUTPATIENT
Start: 2019-05-28 | End: 2019-06-29 | Stop reason: SDUPTHER

## 2019-06-03 ENCOUNTER — OFFICE VISIT (OUTPATIENT)
Dept: FAMILY MEDICINE CLINIC | Facility: CLINIC | Age: 49
End: 2019-06-03
Payer: COMMERCIAL

## 2019-06-03 VITALS
SYSTOLIC BLOOD PRESSURE: 138 MMHG | DIASTOLIC BLOOD PRESSURE: 82 MMHG | TEMPERATURE: 98.5 F | WEIGHT: 239.6 LBS | BODY MASS INDEX: 33.54 KG/M2 | HEART RATE: 84 BPM | HEIGHT: 71 IN | RESPIRATION RATE: 16 BRPM

## 2019-06-03 DIAGNOSIS — R05.9 COUGH: Primary | ICD-10-CM

## 2019-06-03 DIAGNOSIS — R09.82 POSTNASAL DRIP: ICD-10-CM

## 2019-06-03 PROCEDURE — 99213 OFFICE O/P EST LOW 20 MIN: CPT | Performed by: FAMILY MEDICINE

## 2019-06-03 RX ORDER — PROMETHAZINE HYDROCHLORIDE AND CODEINE PHOSPHATE 6.25; 1 MG/5ML; MG/5ML
5 SYRUP ORAL
Qty: 120 ML | Refills: 0 | Status: SHIPPED | OUTPATIENT
Start: 2019-06-03 | End: 2019-06-13 | Stop reason: ALTCHOICE

## 2019-06-03 RX ORDER — PREDNISONE 10 MG/1
TABLET ORAL
Qty: 20 TABLET | Refills: 0 | Status: SHIPPED | OUTPATIENT
Start: 2019-06-03 | End: 2019-06-13 | Stop reason: ALTCHOICE

## 2019-06-04 ENCOUNTER — TELEPHONE (OUTPATIENT)
Dept: FAMILY MEDICINE CLINIC | Facility: CLINIC | Age: 49
End: 2019-06-04

## 2019-06-04 LAB
ALBUMIN SERPL-MCNC: 4.3 G/DL (ref 3.5–5.5)
ALBUMIN/GLOB SERPL: 2 {RATIO} (ref 1.2–2.2)
ALP SERPL-CCNC: 55 IU/L (ref 39–117)
ALT SERPL-CCNC: 33 IU/L (ref 0–44)
AST SERPL-CCNC: 23 IU/L (ref 0–40)
BASOPHILS # BLD AUTO: 0.1 X10E3/UL (ref 0–0.2)
BASOPHILS NFR BLD AUTO: 1 %
BILIRUB SERPL-MCNC: 0.4 MG/DL (ref 0–1.2)
BUN SERPL-MCNC: 16 MG/DL (ref 6–24)
BUN/CREAT SERPL: 17 (ref 9–20)
CALCIUM SERPL-MCNC: 9.2 MG/DL (ref 8.7–10.2)
CHLORIDE SERPL-SCNC: 100 MMOL/L (ref 96–106)
CHOLEST SERPL-MCNC: 202 MG/DL (ref 100–199)
CHOLEST/HDLC SERPL: 5.6 RATIO (ref 0–5)
CO2 SERPL-SCNC: 24 MMOL/L (ref 20–29)
CREAT SERPL-MCNC: 0.95 MG/DL (ref 0.76–1.27)
EOSINOPHIL # BLD AUTO: 0.1 X10E3/UL (ref 0–0.4)
EOSINOPHIL NFR BLD AUTO: 2 %
ERYTHROCYTE [DISTWIDTH] IN BLOOD BY AUTOMATED COUNT: 13.5 % (ref 12.3–15.4)
GLOBULIN SER-MCNC: 2.2 G/DL (ref 1.5–4.5)
GLUCOSE SERPL-MCNC: 120 MG/DL (ref 65–99)
HCT VFR BLD AUTO: 41.8 % (ref 37.5–51)
HDLC SERPL-MCNC: 36 MG/DL
HGB BLD-MCNC: 14.2 G/DL (ref 13–17.7)
IMM GRANULOCYTES # BLD: 0 X10E3/UL (ref 0–0.1)
IMM GRANULOCYTES NFR BLD: 1 %
LDLC SERPL CALC-MCNC: ABNORMAL MG/DL (ref 0–99)
LDLC SERPL DIRECT ASSAY-MCNC: 90 MG/DL (ref 0–99)
LYMPHOCYTES # BLD AUTO: 2.3 X10E3/UL (ref 0.7–3.1)
LYMPHOCYTES NFR BLD AUTO: 40 %
MCH RBC QN AUTO: 29 PG (ref 26.6–33)
MCHC RBC AUTO-ENTMCNC: 34 G/DL (ref 31.5–35.7)
MCV RBC AUTO: 86 FL (ref 79–97)
MONOCYTES # BLD AUTO: 0.4 X10E3/UL (ref 0.1–0.9)
MONOCYTES NFR BLD AUTO: 7 %
NEUTROPHILS # BLD AUTO: 2.8 X10E3/UL (ref 1.4–7)
NEUTROPHILS NFR BLD AUTO: 49 %
PLATELET # BLD AUTO: 220 X10E3/UL (ref 150–450)
POTASSIUM SERPL-SCNC: 4.3 MMOL/L (ref 3.5–5.2)
PROT SERPL-MCNC: 6.5 G/DL (ref 6–8.5)
RBC # BLD AUTO: 4.89 X10E6/UL (ref 4.14–5.8)
SL AMB EGFR AFRICAN AMERICAN: 108 ML/MIN/1.73
SL AMB EGFR NON AFRICAN AMERICAN: 94 ML/MIN/1.73
SL AMB T4, FREE (DIRECT): 1.22 NG/DL (ref 0.82–1.77)
SL AMB VLDL CHOLESTEROL CALC: ABNORMAL MG/DL (ref 5–40)
SODIUM SERPL-SCNC: 137 MMOL/L (ref 134–144)
TRIGL SERPL-MCNC: 522 MG/DL (ref 0–149)
TSH SERPL DL<=0.005 MIU/L-ACNC: 4.84 UIU/ML (ref 0.45–4.5)
WBC # BLD AUTO: 5.7 X10E3/UL (ref 3.4–10.8)

## 2019-06-05 ENCOUNTER — TELEPHONE (OUTPATIENT)
Dept: FAMILY MEDICINE CLINIC | Facility: CLINIC | Age: 49
End: 2019-06-05

## 2019-06-05 DIAGNOSIS — R09.82 POSTNASAL DRIP: Primary | ICD-10-CM

## 2019-06-05 RX ORDER — AZITHROMYCIN 250 MG/1
TABLET, FILM COATED ORAL
Qty: 6 TABLET | Refills: 0 | Status: SHIPPED | OUTPATIENT
Start: 2019-06-05 | End: 2019-06-09

## 2019-06-11 ENCOUNTER — APPOINTMENT (OUTPATIENT)
Dept: RADIOLOGY | Facility: CLINIC | Age: 49
End: 2019-06-11
Payer: COMMERCIAL

## 2019-06-11 ENCOUNTER — TELEPHONE (OUTPATIENT)
Dept: FAMILY MEDICINE CLINIC | Facility: CLINIC | Age: 49
End: 2019-06-11

## 2019-06-11 DIAGNOSIS — R05.9 COUGH: Primary | ICD-10-CM

## 2019-06-11 DIAGNOSIS — R05.9 COUGH: ICD-10-CM

## 2019-06-11 PROCEDURE — 71046 X-RAY EXAM CHEST 2 VIEWS: CPT

## 2019-06-13 ENCOUNTER — OFFICE VISIT (OUTPATIENT)
Dept: FAMILY MEDICINE CLINIC | Facility: CLINIC | Age: 49
End: 2019-06-13
Payer: COMMERCIAL

## 2019-06-13 VITALS
SYSTOLIC BLOOD PRESSURE: 120 MMHG | RESPIRATION RATE: 16 BRPM | DIASTOLIC BLOOD PRESSURE: 98 MMHG | HEART RATE: 98 BPM | BODY MASS INDEX: 32.9 KG/M2 | HEIGHT: 71 IN | WEIGHT: 235 LBS

## 2019-06-13 DIAGNOSIS — I10 BENIGN ESSENTIAL HTN: ICD-10-CM

## 2019-06-13 DIAGNOSIS — F43.22 ADJUSTMENT DISORDER WITH ANXIETY: ICD-10-CM

## 2019-06-13 DIAGNOSIS — I10 ESSENTIAL HYPERTENSION: Primary | ICD-10-CM

## 2019-06-13 DIAGNOSIS — R05.9 COUGH: ICD-10-CM

## 2019-06-13 DIAGNOSIS — E03.9 HYPOTHYROIDISM, UNSPECIFIED TYPE: ICD-10-CM

## 2019-06-13 DIAGNOSIS — E78.5 HYPERLIPIDEMIA, UNSPECIFIED HYPERLIPIDEMIA TYPE: ICD-10-CM

## 2019-06-13 PROCEDURE — 94640 AIRWAY INHALATION TREATMENT: CPT | Performed by: NURSE PRACTITIONER

## 2019-06-13 PROCEDURE — 99214 OFFICE O/P EST MOD 30 MIN: CPT | Performed by: NURSE PRACTITIONER

## 2019-06-13 PROCEDURE — 93000 ELECTROCARDIOGRAM COMPLETE: CPT | Performed by: NURSE PRACTITIONER

## 2019-06-13 RX ORDER — PREDNISONE 10 MG/1
TABLET ORAL
Qty: 30 TABLET | Refills: 0 | Status: SHIPPED | OUTPATIENT
Start: 2019-06-13 | End: 2019-06-27 | Stop reason: ALTCHOICE

## 2019-06-13 RX ORDER — ALBUTEROL SULFATE 2.5 MG/3ML
2.5 SOLUTION RESPIRATORY (INHALATION) EVERY 6 HOURS PRN
Qty: 25 VIAL | Refills: 1 | Status: SHIPPED | OUTPATIENT
Start: 2019-06-13 | End: 2021-09-27 | Stop reason: ALTCHOICE

## 2019-06-13 RX ORDER — BUSPIRONE HYDROCHLORIDE 15 MG/1
15 TABLET ORAL 3 TIMES DAILY
Qty: 60 TABLET | Refills: 5 | Status: SHIPPED | OUTPATIENT
Start: 2019-06-13 | End: 2019-12-22 | Stop reason: SDUPTHER

## 2019-06-13 RX ORDER — PROMETHAZINE HYDROCHLORIDE AND CODEINE PHOSPHATE 6.25; 1 MG/5ML; MG/5ML
5 SYRUP ORAL EVERY 4 HOURS PRN
Qty: 120 ML | Refills: 0 | Status: SHIPPED | OUTPATIENT
Start: 2019-06-13 | End: 2019-06-27 | Stop reason: ALTCHOICE

## 2019-06-27 ENCOUNTER — OFFICE VISIT (OUTPATIENT)
Dept: FAMILY MEDICINE CLINIC | Facility: CLINIC | Age: 49
End: 2019-06-27
Payer: COMMERCIAL

## 2019-06-27 VITALS
DIASTOLIC BLOOD PRESSURE: 80 MMHG | WEIGHT: 238 LBS | HEART RATE: 120 BPM | RESPIRATION RATE: 14 BRPM | SYSTOLIC BLOOD PRESSURE: 122 MMHG | HEIGHT: 71 IN | BODY MASS INDEX: 33.32 KG/M2

## 2019-06-27 DIAGNOSIS — R05.9 COUGH: Primary | ICD-10-CM

## 2019-06-27 PROCEDURE — 3008F BODY MASS INDEX DOCD: CPT | Performed by: NURSE PRACTITIONER

## 2019-06-27 PROCEDURE — 1036F TOBACCO NON-USER: CPT | Performed by: NURSE PRACTITIONER

## 2019-06-27 PROCEDURE — 99213 OFFICE O/P EST LOW 20 MIN: CPT | Performed by: NURSE PRACTITIONER

## 2019-06-27 RX ORDER — CLARITHROMYCIN 500 MG/1
500 TABLET, COATED ORAL EVERY 12 HOURS SCHEDULED
Qty: 20 TABLET | Refills: 0 | Status: SHIPPED | OUTPATIENT
Start: 2019-06-27 | End: 2019-07-07

## 2019-06-29 DIAGNOSIS — S16.1XXA STRAIN OF NECK MUSCLE, INITIAL ENCOUNTER: ICD-10-CM

## 2019-07-01 RX ORDER — ORPHENADRINE CITRATE 100 MG/1
TABLET, EXTENDED RELEASE ORAL
Qty: 60 TABLET | Refills: 0 | Status: SHIPPED | OUTPATIENT
Start: 2019-07-01 | End: 2019-08-07 | Stop reason: SDUPTHER

## 2019-08-07 DIAGNOSIS — S16.1XXA STRAIN OF NECK MUSCLE, INITIAL ENCOUNTER: ICD-10-CM

## 2019-08-07 DIAGNOSIS — E78.5 HYPERLIPIDEMIA, UNSPECIFIED HYPERLIPIDEMIA TYPE: ICD-10-CM

## 2019-08-07 RX ORDER — ORPHENADRINE CITRATE 100 MG/1
TABLET, EXTENDED RELEASE ORAL
Qty: 60 TABLET | Refills: 0 | Status: SHIPPED | OUTPATIENT
Start: 2019-08-07 | End: 2021-03-19 | Stop reason: ALTCHOICE

## 2019-08-08 RX ORDER — SIMVASTATIN 10 MG
TABLET ORAL
Qty: 30 TABLET | Refills: 5 | Status: SHIPPED | OUTPATIENT
Start: 2019-08-08 | End: 2020-02-03

## 2019-09-03 ENCOUNTER — TELEPHONE (OUTPATIENT)
Dept: FAMILY MEDICINE CLINIC | Facility: CLINIC | Age: 49
End: 2019-09-03

## 2019-09-11 DIAGNOSIS — I10 BENIGN ESSENTIAL HTN: ICD-10-CM

## 2019-09-12 LAB
ALBUMIN SERPL-MCNC: 4.3 G/DL (ref 3.5–5.5)
ALBUMIN/GLOB SERPL: 2.3 {RATIO} (ref 1.2–2.2)
ALP SERPL-CCNC: 53 IU/L (ref 39–117)
ALT SERPL-CCNC: 31 IU/L (ref 0–44)
AST SERPL-CCNC: 21 IU/L (ref 0–40)
BILIRUB SERPL-MCNC: 0.9 MG/DL (ref 0–1.2)
BUN SERPL-MCNC: 17 MG/DL (ref 6–24)
BUN/CREAT SERPL: 16 (ref 9–20)
CALCIUM SERPL-MCNC: 9.4 MG/DL (ref 8.7–10.2)
CHLORIDE SERPL-SCNC: 102 MMOL/L (ref 96–106)
CHOLEST SERPL-MCNC: 199 MG/DL (ref 100–199)
CHOLEST/HDLC SERPL: 5.5 RATIO (ref 0–5)
CO2 SERPL-SCNC: 24 MMOL/L (ref 20–29)
CREAT SERPL-MCNC: 1.04 MG/DL (ref 0.76–1.27)
GLOBULIN SER-MCNC: 1.9 G/DL (ref 1.5–4.5)
GLUCOSE SERPL-MCNC: 115 MG/DL (ref 65–99)
HDLC SERPL-MCNC: 36 MG/DL
LDLC SERPL CALC-MCNC: ABNORMAL MG/DL (ref 0–99)
LDLC SERPL DIRECT ASSAY-MCNC: 101 MG/DL (ref 0–99)
POTASSIUM SERPL-SCNC: 4.2 MMOL/L (ref 3.5–5.2)
PROT SERPL-MCNC: 6.2 G/DL (ref 6–8.5)
SL AMB EGFR AFRICAN AMERICAN: 97 ML/MIN/1.73
SL AMB EGFR NON AFRICAN AMERICAN: 84 ML/MIN/1.73
SL AMB T4, FREE (DIRECT): 1.12 NG/DL (ref 0.82–1.77)
SL AMB VLDL CHOLESTEROL CALC: ABNORMAL MG/DL (ref 5–40)
SODIUM SERPL-SCNC: 138 MMOL/L (ref 134–144)
TRIGL SERPL-MCNC: 498 MG/DL (ref 0–149)
TSH SERPL DL<=0.005 MIU/L-ACNC: 8.03 UIU/ML (ref 0.45–4.5)

## 2019-09-12 RX ORDER — LOSARTAN POTASSIUM 50 MG/1
TABLET ORAL
Qty: 30 TABLET | Refills: 0 | Status: SHIPPED | OUTPATIENT
Start: 2019-09-12 | End: 2020-04-02

## 2019-09-16 ENCOUNTER — OFFICE VISIT (OUTPATIENT)
Dept: FAMILY MEDICINE CLINIC | Facility: CLINIC | Age: 49
End: 2019-09-16
Payer: COMMERCIAL

## 2019-09-16 VITALS
SYSTOLIC BLOOD PRESSURE: 128 MMHG | BODY MASS INDEX: 33.74 KG/M2 | HEIGHT: 71 IN | WEIGHT: 241 LBS | HEART RATE: 88 BPM | RESPIRATION RATE: 16 BRPM | DIASTOLIC BLOOD PRESSURE: 90 MMHG

## 2019-09-16 DIAGNOSIS — I10 ESSENTIAL HYPERTENSION: ICD-10-CM

## 2019-09-16 DIAGNOSIS — S16.1XXA STRAIN OF NECK MUSCLE, INITIAL ENCOUNTER: ICD-10-CM

## 2019-09-16 DIAGNOSIS — F43.22 ADJUSTMENT DISORDER WITH ANXIETY: ICD-10-CM

## 2019-09-16 DIAGNOSIS — E03.9 HYPOTHYROIDISM, UNSPECIFIED TYPE: Primary | ICD-10-CM

## 2019-09-16 DIAGNOSIS — E03.8 OTHER SPECIFIED HYPOTHYROIDISM: ICD-10-CM

## 2019-09-16 DIAGNOSIS — E78.5 HYPERLIPIDEMIA, UNSPECIFIED HYPERLIPIDEMIA TYPE: ICD-10-CM

## 2019-09-16 PROCEDURE — 3008F BODY MASS INDEX DOCD: CPT | Performed by: NURSE PRACTITIONER

## 2019-09-16 PROCEDURE — 99214 OFFICE O/P EST MOD 30 MIN: CPT | Performed by: NURSE PRACTITIONER

## 2019-09-16 RX ORDER — ORPHENADRINE CITRATE 100 MG/1
TABLET, EXTENDED RELEASE ORAL
Qty: 60 TABLET | Refills: 0 | OUTPATIENT
Start: 2019-09-16

## 2019-09-16 RX ORDER — LEVOTHYROXINE SODIUM 0.05 MG/1
50 TABLET ORAL DAILY
Qty: 90 TABLET | Refills: 0 | Status: SHIPPED | OUTPATIENT
Start: 2019-09-16 | End: 2019-12-05 | Stop reason: SDUPTHER

## 2019-09-16 NOTE — PROGRESS NOTES
Chief Complaint   Patient presents with    Hypertension     Assessment/Plan:    1  Hypothyroidism, unspecified type  This is new  We will start the levothyroxine and have you take one a day on an empty stomach  We will charles a TSH in 3 months     2  Essential hypertension  Your BP is not optimally controlled  Please continue the losartan and pursue the weight loss and exercise  We will charles this  In 3 months     3  Adjustment disorder with anxiety  Doing really well with the buspar  4  Hyperlipidemia, unspecified hyperlipidemia type  Simvastatin  Please get serious about your weight and diet  rto 3 months with labs prior  Subjective:      Patient ID: Samara Lynch is a 51 y o  male  Here today to charles on several chronic issues as well as to review his blood work  States that he does not check his BP when he is not here  He and his wife are starting a weight loss program soon and are looking at various methods to loose weight  Has also joined a gym  Admits that alcohol is a sourse of calories and has switched to light beer but still drinks too many     refuses a flu shot this year         The following portions of the patient's history were reviewed and updated as appropriate: allergies, current medications, past family history, past medical history, past social history, past surgical history and problem list     Past Medical History:   Diagnosis Date    Acute sinusitis 2011    Anxiety     Cellulitis 2011    Cellulitis of neck 2010    Facial cellulitis 2010    Hypercholesterolemia     Lymph nodes enlarged     Sensory disturbance      Past Surgical History:   Procedure Laterality Date    NO PAST SURGERIES       Family History   Problem Relation Age of Onset    Hypertension Mother     Hyperlipidemia Mother     Coronary artery disease Father     Heart attack Brother         B/D 52 MI    Leukemia Brother     Leukemia Son     Mental illness Neg Hx     Substance Abuse Neg Hx Social History   Social History     Socioeconomic History    Marital status: /Civil Union     Spouse name: Not on file    Number of children: Not on file    Years of education: Not on file    Highest education level: Not on file   Occupational History    Not on file   Social Needs    Financial resource strain: Not on file    Food insecurity:     Worry: Not on file     Inability: Not on file    Transportation needs:     Medical: Not on file     Non-medical: Not on file   Tobacco Use    Smoking status: Never Smoker    Smokeless tobacco: Never Used    Tobacco comment: DENIED: HISTORY OF TOBACCO USE   Substance and Sexual Activity    Alcohol use: Yes     Alcohol/week: 2 0 standard drinks     Types: 2 Cans of beer per week     Comment: SOCIAL; 2 BEERS A DAY AS PER ALL SCRIPTS     Drug use: No    Sexual activity: Not on file   Lifestyle    Physical activity:     Days per week: Not on file     Minutes per session: Not on file    Stress: Not on file   Relationships    Social connections:     Talks on phone: Not on file     Gets together: Not on file     Attends Evangelical service: Not on file     Active member of club or organization: Not on file     Attends meetings of clubs or organizations: Not on file     Relationship status: Not on file    Intimate partner violence:     Fear of current or ex partner: Not on file     Emotionally abused: Not on file     Physically abused: Not on file     Forced sexual activity: Not on file   Other Topics Concern    Not on file   Social History Narrative    Always uses seat belt    Caffeine use: 4 cups of coffee qd    Has smoke detectors     Review of Systems   Constitutional: Negative  Respiratory: Negative  Cardiovascular: Negative  Musculoskeletal: Negative  Skin: Negative  Neurological: Negative  Psychiatric/Behavioral: Negative            Vitals:    09/16/19 1051   BP: 128/90   BP Location: Left arm   Patient Position: Sitting   Pulse: 88 Resp: 16   Weight: 109 kg (241 lb)   Height: 5' 10 75" (1 797 m)       Objective: Wt Readings from Last 3 Encounters:   09/16/19 109 kg (241 lb)   06/27/19 108 kg (238 lb)   06/13/19 107 kg (235 lb)     BP Readings from Last 3 Encounters:   09/16/19 128/90   06/27/19 122/80   06/13/19 120/98     Pulse Readings from Last 3 Encounters:   09/16/19 88   06/27/19 (!) 120   06/13/19 98     BMI Readings from Last 3 Encounters:   09/16/19 33 85 kg/m²   06/27/19 33 43 kg/m²   06/13/19 32 78 kg/m²            Physical Exam   Constitutional: He is oriented to person, place, and time  He appears well-developed and well-nourished  HENT:   Right Ear: Tympanic membrane and ear canal normal    Left Ear: Tympanic membrane and ear canal normal    Neck: Normal range of motion  Neck supple  Cardiovascular: Normal rate, regular rhythm, normal heart sounds and intact distal pulses  Exam reveals no decreased pulses  Pulmonary/Chest: Effort normal and breath sounds normal  No respiratory distress  He has no wheezes  He has no rales  Musculoskeletal: Normal range of motion  Neurological: He is alert and oriented to person, place, and time  He has normal reflexes  No cranial nerve deficit  Skin: Skin is warm and dry  Psychiatric: He has a normal mood and affect   His behavior is normal  Judgment and thought content normal

## 2019-09-17 DIAGNOSIS — I10 BENIGN ESSENTIAL HTN: ICD-10-CM

## 2019-09-17 RX ORDER — LOSARTAN POTASSIUM 50 MG/1
TABLET ORAL
Qty: 30 TABLET | Refills: 5 | Status: SHIPPED | OUTPATIENT
Start: 2019-09-17 | End: 2019-12-17 | Stop reason: SDUPTHER

## 2019-12-05 DIAGNOSIS — E03.9 HYPOTHYROIDISM, UNSPECIFIED TYPE: ICD-10-CM

## 2019-12-05 RX ORDER — LEVOTHYROXINE SODIUM 0.05 MG/1
TABLET ORAL
Qty: 30 TABLET | Refills: 2 | Status: SHIPPED | OUTPATIENT
Start: 2019-12-05 | End: 2020-03-02

## 2019-12-12 LAB — TSH SERPL DL<=0.005 MIU/L-ACNC: 3.09 UIU/ML (ref 0.45–4.5)

## 2019-12-17 ENCOUNTER — OFFICE VISIT (OUTPATIENT)
Dept: FAMILY MEDICINE CLINIC | Facility: CLINIC | Age: 49
End: 2019-12-17
Payer: COMMERCIAL

## 2019-12-17 VITALS
DIASTOLIC BLOOD PRESSURE: 60 MMHG | RESPIRATION RATE: 16 BRPM | WEIGHT: 237.5 LBS | SYSTOLIC BLOOD PRESSURE: 112 MMHG | HEART RATE: 72 BPM | HEIGHT: 71 IN | BODY MASS INDEX: 33.25 KG/M2

## 2019-12-17 DIAGNOSIS — F43.22 ADJUSTMENT DISORDER WITH ANXIETY: ICD-10-CM

## 2019-12-17 DIAGNOSIS — Q82.8 ACCESSORY SKIN TAGS: ICD-10-CM

## 2019-12-17 DIAGNOSIS — I10 ESSENTIAL HYPERTENSION: ICD-10-CM

## 2019-12-17 DIAGNOSIS — R73.03 PREDIABETES: ICD-10-CM

## 2019-12-17 DIAGNOSIS — Z12.5 SCREENING PSA (PROSTATE SPECIFIC ANTIGEN): ICD-10-CM

## 2019-12-17 DIAGNOSIS — E78.5 HYPERLIPIDEMIA, UNSPECIFIED HYPERLIPIDEMIA TYPE: ICD-10-CM

## 2019-12-17 DIAGNOSIS — E03.8 OTHER SPECIFIED HYPOTHYROIDISM: ICD-10-CM

## 2019-12-17 DIAGNOSIS — Z12.11 ENCOUNTER FOR SCREENING COLONOSCOPY: Primary | ICD-10-CM

## 2019-12-17 DIAGNOSIS — E55.9 VITAMIN D DEFICIENCY: ICD-10-CM

## 2019-12-17 PROCEDURE — 3008F BODY MASS INDEX DOCD: CPT

## 2019-12-17 PROCEDURE — 3074F SYST BP LT 130 MM HG: CPT

## 2019-12-17 PROCEDURE — 3078F DIAST BP <80 MM HG: CPT

## 2019-12-17 PROCEDURE — 1036F TOBACCO NON-USER: CPT

## 2019-12-17 PROCEDURE — 99214 OFFICE O/P EST MOD 30 MIN: CPT

## 2019-12-17 NOTE — PROGRESS NOTES
Chief Complaint   Patient presents with    Hyperlipidemia    Hypertension    Vitamin D Deficiency     Assessment/Plan:  1  Encounter for screening colonoscopy  Order given for this   - Ambulatory referral to Gastroenterology; Future    2  Other specified hypothyroidism  Is now in a normal range with the 50 mcg of the levothyroxine  Continue same and charles in 6m onths     3  Essential hypertension  This is great with the losartan  Continue same and charles in 6 months     4  Adjustment disorder with anxiety  Great response to the buspar  We will maintain this     5  Hyperlipidemia, unspecified hyperlipidemia type  On simvastatin   Lipids to be checked in 6  months     6  Prediabetes  ha1c is 6 9  Charles in 6 months  Continue to watch diet  7  Vitamin D deficiency  On OTC supplement  rto 6 months with labs prior         Subjective:      Patient ID: Khoa Gonzalez is a 47 y o  male  Here today to charles on several chronic issues   does not check his BP when he is not here  Is consistent with his medications     Refuses his flu shot   Is open to having his colonoscopy  done       The following portions of the patient's history were reviewed and updated as appropriate: allergies, current medications, past family history, past medical history, past social history, past surgical history and problem list     Past Medical History:   Diagnosis Date    Acute sinusitis 2011    Anxiety     Cellulitis 2011    Cellulitis of neck 2010    Facial cellulitis 2010    Hypercholesterolemia     Lymph nodes enlarged     Sensory disturbance      Past Surgical History:   Procedure Laterality Date    NO PAST SURGERIES       Family History   Problem Relation Age of Onset    Hypertension Mother     Hyperlipidemia Mother     Coronary artery disease Father     Heart attack Brother         B/D 52 MI    Leukemia Brother     Leukemia Son     Mental illness Neg Hx     Substance Abuse Neg Hx      Social History   Social History     Socioeconomic History    Marital status: /Civil Union     Spouse name: Not on file    Number of children: Not on file    Years of education: Not on file    Highest education level: Not on file   Occupational History    Not on file   Social Needs    Financial resource strain: Not on file    Food insecurity:     Worry: Not on file     Inability: Not on file    Transportation needs:     Medical: Not on file     Non-medical: Not on file   Tobacco Use    Smoking status: Never Smoker    Smokeless tobacco: Never Used    Tobacco comment: DENIED: HISTORY OF TOBACCO USE   Substance and Sexual Activity    Alcohol use: Yes     Alcohol/week: 2 0 standard drinks     Types: 2 Cans of beer per week     Comment: SOCIAL; 2 BEERS A DAY AS PER ALL SCRIPTS     Drug use: No    Sexual activity: Not on file   Lifestyle    Physical activity:     Days per week: Not on file     Minutes per session: Not on file    Stress: Not on file   Relationships    Social connections:     Talks on phone: Not on file     Gets together: Not on file     Attends Judaism service: Not on file     Active member of club or organization: Not on file     Attends meetings of clubs or organizations: Not on file     Relationship status: Not on file    Intimate partner violence:     Fear of current or ex partner: Not on file     Emotionally abused: Not on file     Physically abused: Not on file     Forced sexual activity: Not on file   Other Topics Concern    Not on file   Social History Narrative    Always uses seat belt    Caffeine use: 4 cups of coffee qd    Has smoke detectors     Review of Systems   Constitutional: Negative  Respiratory: Negative  Cardiovascular: Negative  Musculoskeletal: Negative  Skin: Negative  Neurological: Negative  Psychiatric/Behavioral: Negative            Vitals:    12/17/19 0759   BP: 112/60   BP Location: Left arm   Patient Position: Sitting   Cuff Size: Large   Pulse: 72   Resp: 16   Weight: 108 kg (237 lb 8 oz)   Height: 5' 11" (1 803 m)       Objective: Wt Readings from Last 3 Encounters:   12/17/19 108 kg (237 lb 8 oz)   09/16/19 109 kg (241 lb)   06/27/19 108 kg (238 lb)     BP Readings from Last 3 Encounters:   12/17/19 112/60   09/16/19 128/90   06/27/19 122/80     Pulse Readings from Last 3 Encounters:   12/17/19 72   09/16/19 88   06/27/19 (!) 120     BMI Readings from Last 3 Encounters:   12/17/19 33 12 kg/m²   09/16/19 33 85 kg/m²   06/27/19 33 43 kg/m²            Physical Exam   Constitutional: He is oriented to person, place, and time  He appears well-developed and well-nourished  HENT:   Right Ear: Tympanic membrane and ear canal normal    Left Ear: Tympanic membrane and ear canal normal    Neck: Normal range of motion  Neck supple  Cardiovascular: Normal rate, regular rhythm, normal heart sounds and intact distal pulses  Exam reveals no decreased pulses  Pulmonary/Chest: Effort normal and breath sounds normal  No respiratory distress  He has no wheezes  He has no rales  Musculoskeletal: Normal range of motion  Neurological: He is alert and oriented to person, place, and time  He has normal reflexes  No cranial nerve deficit  Skin: Skin is warm and dry  Psychiatric: He has a normal mood and affect   His behavior is normal  Judgment and thought content normal

## 2019-12-22 DIAGNOSIS — F43.22 ADJUSTMENT DISORDER WITH ANXIETY: ICD-10-CM

## 2019-12-23 RX ORDER — BUSPIRONE HYDROCHLORIDE 15 MG/1
TABLET ORAL
Qty: 60 TABLET | Refills: 0 | Status: SHIPPED | OUTPATIENT
Start: 2019-12-23 | End: 2020-01-23

## 2020-01-22 DIAGNOSIS — F43.22 ADJUSTMENT DISORDER WITH ANXIETY: ICD-10-CM

## 2020-01-23 RX ORDER — BUSPIRONE HYDROCHLORIDE 15 MG/1
TABLET ORAL
Qty: 60 TABLET | Refills: 0 | Status: SHIPPED | OUTPATIENT
Start: 2020-01-23 | End: 2020-02-24

## 2020-02-02 DIAGNOSIS — E78.5 HYPERLIPIDEMIA, UNSPECIFIED HYPERLIPIDEMIA TYPE: ICD-10-CM

## 2020-02-03 RX ORDER — SIMVASTATIN 10 MG
TABLET ORAL
Qty: 30 TABLET | Refills: 5 | Status: SHIPPED | OUTPATIENT
Start: 2020-02-03 | End: 2020-07-23

## 2020-02-23 DIAGNOSIS — F43.22 ADJUSTMENT DISORDER WITH ANXIETY: ICD-10-CM

## 2020-02-24 RX ORDER — BUSPIRONE HYDROCHLORIDE 15 MG/1
TABLET ORAL
Qty: 60 TABLET | Refills: 0 | Status: SHIPPED | OUTPATIENT
Start: 2020-02-24 | End: 2020-03-23

## 2020-02-25 ENCOUNTER — CLINICAL SUPPORT (OUTPATIENT)
Dept: GASTROENTEROLOGY | Facility: CLINIC | Age: 50
End: 2020-02-25

## 2020-02-25 VITALS — BODY MASS INDEX: 32.78 KG/M2 | HEIGHT: 72 IN | WEIGHT: 242 LBS

## 2020-02-25 DIAGNOSIS — Z12.11 SCREENING FOR COLON CANCER: Primary | ICD-10-CM

## 2020-02-25 NOTE — PROGRESS NOTES
Pt seen for colon prep dx screening  Meds reviewed with pt  Instructions given  Screening form signed  Clenpiq rx sent to pharmacy and gave pt a coupon   Colon bmec 3/19/20 KK

## 2020-03-02 DIAGNOSIS — E03.9 HYPOTHYROIDISM, UNSPECIFIED TYPE: ICD-10-CM

## 2020-03-02 RX ORDER — LEVOTHYROXINE SODIUM 0.05 MG/1
TABLET ORAL
Qty: 30 TABLET | Refills: 12 | Status: SHIPPED | OUTPATIENT
Start: 2020-03-02 | End: 2021-03-14

## 2020-03-22 DIAGNOSIS — F43.22 ADJUSTMENT DISORDER WITH ANXIETY: ICD-10-CM

## 2020-03-23 RX ORDER — BUSPIRONE HYDROCHLORIDE 15 MG/1
TABLET ORAL
Qty: 60 TABLET | Refills: 0 | Status: SHIPPED | OUTPATIENT
Start: 2020-03-23 | End: 2020-04-27

## 2020-04-01 DIAGNOSIS — I10 BENIGN ESSENTIAL HTN: ICD-10-CM

## 2020-04-02 RX ORDER — LOSARTAN POTASSIUM 50 MG/1
TABLET ORAL
Qty: 30 TABLET | Refills: 5 | Status: SHIPPED | OUTPATIENT
Start: 2020-04-02 | End: 2020-05-28

## 2020-04-26 DIAGNOSIS — F43.22 ADJUSTMENT DISORDER WITH ANXIETY: ICD-10-CM

## 2020-04-27 DIAGNOSIS — F43.22 ADJUSTMENT DISORDER WITH ANXIETY: ICD-10-CM

## 2020-04-27 RX ORDER — BUSPIRONE HYDROCHLORIDE 15 MG/1
15 TABLET ORAL 3 TIMES DAILY
Qty: 90 TABLET | Refills: 6 | Status: SHIPPED | OUTPATIENT
Start: 2020-04-27 | End: 2020-12-22

## 2020-04-27 RX ORDER — BUSPIRONE HYDROCHLORIDE 15 MG/1
TABLET ORAL
Qty: 60 TABLET | Refills: 0 | Status: SHIPPED | OUTPATIENT
Start: 2020-04-27 | End: 2020-04-27 | Stop reason: SDUPTHER

## 2020-05-06 ENCOUNTER — TELEPHONE (OUTPATIENT)
Dept: GASTROENTEROLOGY | Facility: CLINIC | Age: 50
End: 2020-05-06

## 2020-05-27 DIAGNOSIS — I10 BENIGN ESSENTIAL HTN: ICD-10-CM

## 2020-05-28 RX ORDER — LOSARTAN POTASSIUM 50 MG/1
TABLET ORAL
Qty: 30 TABLET | Refills: 5 | Status: SHIPPED | OUTPATIENT
Start: 2020-05-28 | End: 2021-01-03

## 2020-07-22 DIAGNOSIS — E78.5 HYPERLIPIDEMIA, UNSPECIFIED HYPERLIPIDEMIA TYPE: ICD-10-CM

## 2020-07-23 RX ORDER — SIMVASTATIN 10 MG
TABLET ORAL
Qty: 30 TABLET | Refills: 5 | Status: SHIPPED | OUTPATIENT
Start: 2020-07-23 | End: 2020-10-13 | Stop reason: ALTCHOICE

## 2020-08-04 LAB
ALBUMIN SERPL-MCNC: 4.7 G/DL (ref 4–5)
ALBUMIN/GLOB SERPL: 2.1 {RATIO} (ref 1.2–2.2)
ALP SERPL-CCNC: 62 IU/L (ref 39–117)
ALT SERPL-CCNC: 53 IU/L (ref 0–44)
AST SERPL-CCNC: 30 IU/L (ref 0–40)
BASOPHILS # BLD AUTO: 0.1 X10E3/UL (ref 0–0.2)
BASOPHILS NFR BLD AUTO: 1 %
BILIRUB SERPL-MCNC: 1 MG/DL (ref 0–1.2)
BUN SERPL-MCNC: 14 MG/DL (ref 6–24)
BUN/CREAT SERPL: 12 (ref 9–20)
CALCIUM SERPL-MCNC: 9.7 MG/DL (ref 8.7–10.2)
CHLORIDE SERPL-SCNC: 104 MMOL/L (ref 96–106)
CHOLEST SERPL-MCNC: 214 MG/DL (ref 100–199)
CHOLEST/HDLC SERPL: 5 RATIO (ref 0–5)
CO2 SERPL-SCNC: 24 MMOL/L (ref 20–29)
CREAT SERPL-MCNC: 1.15 MG/DL (ref 0.76–1.27)
EOSINOPHIL # BLD AUTO: 0.1 X10E3/UL (ref 0–0.4)
EOSINOPHIL NFR BLD AUTO: 2 %
ERYTHROCYTE [DISTWIDTH] IN BLOOD BY AUTOMATED COUNT: 13.1 % (ref 11.6–15.4)
EST. AVERAGE GLUCOSE BLD GHB EST-MCNC: 120 MG/DL
GLOBULIN SER-MCNC: 2.2 G/DL (ref 1.5–4.5)
GLUCOSE SERPL-MCNC: 104 MG/DL (ref 65–99)
HBA1C MFR BLD: 5.8 % (ref 4.8–5.6)
HCT VFR BLD AUTO: 42.9 % (ref 37.5–51)
HDLC SERPL-MCNC: 43 MG/DL
HGB BLD-MCNC: 14.4 G/DL (ref 13–17.7)
IMM GRANULOCYTES # BLD: 0 X10E3/UL (ref 0–0.1)
IMM GRANULOCYTES NFR BLD: 1 %
LDLC SERPL CALC-MCNC: 96 MG/DL (ref 0–99)
LDLC SERPL DIRECT ASSAY-MCNC: 115 MG/DL (ref 0–99)
LYMPHOCYTES # BLD AUTO: 2 X10E3/UL (ref 0.7–3.1)
LYMPHOCYTES NFR BLD AUTO: 35 %
MCH RBC QN AUTO: 30.1 PG (ref 26.6–33)
MCHC RBC AUTO-ENTMCNC: 33.6 G/DL (ref 31.5–35.7)
MCV RBC AUTO: 90 FL (ref 79–97)
MONOCYTES # BLD AUTO: 0.3 X10E3/UL (ref 0.1–0.9)
MONOCYTES NFR BLD AUTO: 6 %
NEUTROPHILS # BLD AUTO: 3.2 X10E3/UL (ref 1.4–7)
NEUTROPHILS NFR BLD AUTO: 55 %
PLATELET # BLD AUTO: 264 X10E3/UL (ref 150–450)
POTASSIUM SERPL-SCNC: 5.1 MMOL/L (ref 3.5–5.2)
PROT SERPL-MCNC: 6.9 G/DL (ref 6–8.5)
PSA FREE MFR SERPL: 18.2 %
PSA FREE SERPL-MCNC: 0.51 NG/ML
PSA SERPL-MCNC: 2.8 NG/ML (ref 0–4)
RBC # BLD AUTO: 4.78 X10E6/UL (ref 4.14–5.8)
SL AMB EGFR AFRICAN AMERICAN: 85 ML/MIN/1.73
SL AMB EGFR NON AFRICAN AMERICAN: 74 ML/MIN/1.73
SL AMB VLDL CHOLESTEROL CALC: 75 MG/DL (ref 5–40)
SODIUM SERPL-SCNC: 141 MMOL/L (ref 134–144)
TRIGL SERPL-MCNC: 376 MG/DL (ref 0–149)
TSH SERPL DL<=0.005 MIU/L-ACNC: 3.19 UIU/ML (ref 0.45–4.5)
WBC # BLD AUTO: 5.8 X10E3/UL (ref 3.4–10.8)

## 2020-08-07 ENCOUNTER — OFFICE VISIT (OUTPATIENT)
Dept: FAMILY MEDICINE CLINIC | Facility: CLINIC | Age: 50
End: 2020-08-07
Payer: COMMERCIAL

## 2020-08-07 VITALS
WEIGHT: 246.3 LBS | HEART RATE: 77 BPM | HEIGHT: 71 IN | DIASTOLIC BLOOD PRESSURE: 82 MMHG | RESPIRATION RATE: 16 BRPM | BODY MASS INDEX: 34.48 KG/M2 | TEMPERATURE: 98.4 F | SYSTOLIC BLOOD PRESSURE: 128 MMHG

## 2020-08-07 DIAGNOSIS — Q82.8 ACCESSORY SKIN TAGS: ICD-10-CM

## 2020-08-07 DIAGNOSIS — R35.0 URINARY FREQUENCY: ICD-10-CM

## 2020-08-07 DIAGNOSIS — N52.9 ERECTILE DYSFUNCTION, UNSPECIFIED ERECTILE DYSFUNCTION TYPE: ICD-10-CM

## 2020-08-07 DIAGNOSIS — Z91.89 HIGH RISK OF CARDIAC EVENT: ICD-10-CM

## 2020-08-07 PROCEDURE — 3008F BODY MASS INDEX DOCD: CPT | Performed by: NURSE PRACTITIONER

## 2020-08-07 PROCEDURE — 3079F DIAST BP 80-89 MM HG: CPT | Performed by: NURSE PRACTITIONER

## 2020-08-07 PROCEDURE — 99214 OFFICE O/P EST MOD 30 MIN: CPT | Performed by: NURSE PRACTITIONER

## 2020-08-07 PROCEDURE — 3074F SYST BP LT 130 MM HG: CPT | Performed by: NURSE PRACTITIONER

## 2020-08-07 PROCEDURE — 1036F TOBACCO NON-USER: CPT | Performed by: NURSE PRACTITIONER

## 2020-08-07 PROCEDURE — 3725F SCREEN DEPRESSION PERFORMED: CPT | Performed by: NURSE PRACTITIONER

## 2020-08-07 RX ORDER — SILDENAFIL 100 MG/1
100 TABLET, FILM COATED ORAL DAILY PRN
Qty: 10 TABLET | Refills: 4 | Status: SHIPPED | OUTPATIENT
Start: 2020-08-07 | End: 2021-03-19

## 2020-08-07 NOTE — PROGRESS NOTES
Chief Complaint   Patient presents with    Hypothyroidism    Hypertension    Anxiety    Hyperlipidemia    Prediabetes    Vitamin D Deficiency     Assessment/Plan:    1  Hypothyroidism, unspecified type  Stable on the levothyroxine 50  / tsh due in 4/2021    2  Essential hypertension  BP is good on the losartan 50 mg  Continue same  Do home monitoring and charles this in 6 months     3  Adjustment disorder with anxiety  Doing really well with the buspar  4  Hyperlipidemia, unspecified hyperlipidemia type  Simvastatin  Numbers are improved with lifestyle    rto 6 months with labs prior      Subjective:      Patient ID: Sosa Jason is a 47 y o  male  Here today to charles on several chronic issues as well as to review his blood work  States that he does not check his BP when he is not here  He and his wife are starting a weight loss program soon and are looking at various methods to loose weight  Has also joined a gym  Admits that alcohol is a sourse of calories and has switched to light beer but still drinks too many     refuses a flu shot this year         The following portions of the patient's history were reviewed and updated as appropriate: allergies, current medications, past family history, past medical history, past social history, past surgical history and problem list     Past Medical History:   Diagnosis Date    Acute sinusitis 2011    Anxiety     Cellulitis 2011    Cellulitis of neck 2010    Facial cellulitis 2010    Hypercholesterolemia     Lymph nodes enlarged     Sensory disturbance      Past Surgical History:   Procedure Laterality Date    NO PAST SURGERIES       Family History   Problem Relation Age of Onset    Hypertension Mother     Hyperlipidemia Mother     Coronary artery disease Father     Heart attack Brother         B/D 52 MI    Leukemia Brother     Leukemia Son     Mental illness Neg Hx     Substance Abuse Neg Hx     Alcohol abuse Neg Hx      Social History Social History     Socioeconomic History    Marital status: /Civil Union     Spouse name: Not on file    Number of children: Not on file    Years of education: Not on file    Highest education level: Not on file   Occupational History    Not on file   Social Needs    Financial resource strain: Not on file    Food insecurity     Worry: Not on file     Inability: Not on file   [employer] needs     Medical: Not on file     Non-medical: Not on file   Tobacco Use    Smoking status: Never Smoker    Smokeless tobacco: Never Used    Tobacco comment: DENIED: HISTORY OF TOBACCO USE   Substance and Sexual Activity    Alcohol use: Yes     Alcohol/week: 2 0 standard drinks     Types: 2 Cans of beer per week     Comment: SOCIAL; 2 BEERS A DAY AS PER ALL SCRIPTS     Drug use: No    Sexual activity: Not on file   Lifestyle    Physical activity     Days per week: Not on file     Minutes per session: Not on file    Stress: Not on file   Relationships    Social connections     Talks on phone: Not on file     Gets together: Not on file     Attends Gnosticist service: Not on file     Active member of club or organization: Not on file     Attends meetings of clubs or organizations: Not on file     Relationship status: Not on file    Intimate partner violence     Fear of current or ex partner: Not on file     Emotionally abused: Not on file     Physically abused: Not on file     Forced sexual activity: Not on file   Other Topics Concern    Not on file   Social History Narrative    Always uses seat belt    Caffeine use: 4 cups of coffee qd    Has smoke detectors     Review of Systems   Constitutional: Negative  Respiratory: Negative  Cardiovascular: Negative  Musculoskeletal: Negative  Skin: Negative  Neurological: Negative  Psychiatric/Behavioral: Negative            Vitals:    08/07/20 1105   BP: 128/82   BP Location: Left arm   Patient Position: Sitting   Cuff Size: Adult   Pulse: 77 Resp: 16   Temp: 98 4 °F (36 9 °C)   Weight: 112 kg (246 lb 4 8 oz)   Height: 5' 11" (1 803 m)       Objective: Wt Readings from Last 3 Encounters:   08/07/20 112 kg (246 lb 4 8 oz)   02/25/20 110 kg (242 lb)   12/17/19 108 kg (237 lb 8 oz)     BP Readings from Last 3 Encounters:   08/07/20 128/82   12/17/19 112/60   09/16/19 128/90     Pulse Readings from Last 3 Encounters:   08/07/20 77   12/17/19 72   09/16/19 88     BMI Readings from Last 3 Encounters:   08/07/20 34 35 kg/m²   02/25/20 32 82 kg/m²   12/17/19 33 12 kg/m²            Physical Exam   Constitutional: He is oriented to person, place, and time  He appears well-developed and well-nourished  HENT:   Right Ear: Tympanic membrane and ear canal normal    Left Ear: Tympanic membrane and ear canal normal    Neck: Normal range of motion  Neck supple  Cardiovascular: Normal rate, regular rhythm, normal heart sounds and intact distal pulses  Exam reveals no decreased pulses  Pulmonary/Chest: Effort normal and breath sounds normal  No respiratory distress  He has no wheezes  He has no rales  Musculoskeletal: Normal range of motion  Neurological: He is alert and oriented to person, place, and time  He has normal reflexes  No cranial nerve deficit  Skin: Skin is warm and dry  Psychiatric: He has a normal mood and affect  His behavior is normal  Judgment and thought content normal        @[email or web address]  Chief Complaint   Patient presents with    Hypothyroidism    Hypertension    Anxiety    Hyperlipidemia    Prediabetes    Vitamin D Deficiency     Assessment/Plan:          Subjective:      Patient ID: Ramiro Benton is a 47 y o  male  Here today to charles on several chronic issues  States that he has been consistent with his medications and is checking his BP occasionally at home    he is doing really well on the buspar and desires to continue this   Has been watching his diet but is not exercising that much           The following portions of the patient's history were reviewed and updated as appropriate: allergies, current medications, past family history, past medical history, past social history, past surgical history and problem list     Past Medical History:   Diagnosis Date    Acute sinusitis 2011    Anxiety     Cellulitis 2011    Cellulitis of neck 2010    Facial cellulitis 2010    Hypercholesterolemia     Lymph nodes enlarged     Sensory disturbance      Past Surgical History:   Procedure Laterality Date    NO PAST SURGERIES       Family History   Problem Relation Age of Onset    Hypertension Mother     Hyperlipidemia Mother     Coronary artery disease Father     Heart attack Brother         B/D 52 MI    Leukemia Brother     Leukemia Son     Mental illness Neg Hx     Substance Abuse Neg Hx     Alcohol abuse Neg Hx      Social History   Social History     Socioeconomic History    Marital status: /Civil Union     Spouse name: Not on file    Number of children: Not on file    Years of education: Not on file    Highest education level: Not on file   Occupational History    Not on file   Social Needs    Financial resource strain: Not on file    Food insecurity     Worry: Not on file     Inability: Not on file   [employer] needs     Medical: Not on file     Non-medical: Not on file   Tobacco Use    Smoking status: Never Smoker    Smokeless tobacco: Never Used    Tobacco comment: DENIED: HISTORY OF TOBACCO USE   Substance and Sexual Activity    Alcohol use:  Yes     Alcohol/week: 2 0 standard drinks     Types: 2 Cans of beer per week     Comment: SOCIAL; 2 BEERS A DAY AS PER ALL SCRIPTS     Drug use: No    Sexual activity: Not on file   Lifestyle    Physical activity     Days per week: Not on file     Minutes per session: Not on file    Stress: Not on file   Relationships    Social connections     Talks on phone: Not on file     Gets together: Not on file     Attends Shinto service: Not on file     Active member of club or organization: Not on file     Attends meetings of clubs or organizations: Not on file     Relationship status: Not on file    Intimate partner violence     Fear of current or ex partner: Not on file     Emotionally abused: Not on file     Physically abused: Not on file     Forced sexual activity: Not on file   Other Topics Concern    Not on file   Social History Narrative    Always uses seat belt    Caffeine use: 4 cups of coffee qd    Has smoke detectors     Review of Systems   Constitutional: Negative  Respiratory: Negative  Cardiovascular: Negative  Musculoskeletal: Negative  Skin: Negative  Neurological: Negative  Psychiatric/Behavioral: Negative  Vitals:    08/07/20 1105   BP: 128/82   BP Location: Left arm   Patient Position: Sitting   Cuff Size: Adult   Pulse: 77   Resp: 16   Temp: 98 4 °F (36 9 °C)   Weight: 112 kg (246 lb 4 8 oz)   Height: 5' 11" (1 803 m)       Objective: Wt Readings from Last 3 Encounters:   08/07/20 112 kg (246 lb 4 8 oz)   02/25/20 110 kg (242 lb)   12/17/19 108 kg (237 lb 8 oz)     BP Readings from Last 3 Encounters:   08/07/20 128/82   12/17/19 112/60   09/16/19 128/90     Pulse Readings from Last 3 Encounters:   08/07/20 77   12/17/19 72   09/16/19 88     BMI Readings from Last 3 Encounters:   08/07/20 34 35 kg/m²   02/25/20 32 82 kg/m²   12/17/19 33 12 kg/m²     Office Visit on 12/17/2019   Component Date Value Ref Range Status    Cholesterol, Total 08/03/2020 214* 100 - 199 mg/dL Final    Triglycerides 08/03/2020 376* 0 - 149 mg/dL Final    HDL 08/03/2020 43  >39 mg/dL Final    VLDL Cholesterol Calculated 08/03/2020 75* 5 - 40 mg/dL Final    LDL Calculated 08/03/2020 96  0 - 99 mg/dL Final    T  Chol/HDL Ratio 08/03/2020 5 0  0 0 - 5 0 ratio Final    Comment:                                   T  Chol/HDL Ratio                                              Men  Women                                1/2 Avg  Risk  3 4 3 3                                    Avg Risk  5 0    4 4                                 2X Avg  Risk  9 6    7 1                                 3X Avg  Risk 23 4   11 0      White Blood Cell Count 08/03/2020 5 8  3 4 - 10 8 x10E3/uL Final    Red Blood Cell Count 08/03/2020 4 78  4 14 - 5 80 x10E6/uL Final    Hemoglobin 08/03/2020 14 4  13 0 - 17 7 g/dL Final    HCT 08/03/2020 42 9  37 5 - 51 0 % Final    MCV 08/03/2020 90  79 - 97 fL Final    MCH 08/03/2020 30 1  26 6 - 33 0 pg Final    MCHC 08/03/2020 33 6  31 5 - 35 7 g/dL Final    RDW 08/03/2020 13 1  11 6 - 15 4 % Final    Platelet Count 59/79/9354 264  150 - 450 x10E3/uL Final    Neutrophils 08/03/2020 55  Not Estab  % Final    Lymphocytes 08/03/2020 35  Not Estab  % Final    Monocytes 08/03/2020 6  Not Estab  % Final    Eosinophils 08/03/2020 2  Not Estab  % Final    Basophils PCT 08/03/2020 1  Not Estab  % Final    Neutrophils (Absolute) 08/03/2020 3 2  1 4 - 7 0 x10E3/uL Final    Lymphocytes (Absolute) 08/03/2020 2 0  0 7 - 3 1 x10E3/uL Final    Monocytes (Absolute) 08/03/2020 0 3  0 1 - 0 9 x10E3/uL Final    Eosinophils (Absolute) 08/03/2020 0 1  0 0 - 0 4 x10E3/uL Final    Basophils ABS 08/03/2020 0 1  0 0 - 0 2 x10E3/uL Final    Immature Granulocytes 08/03/2020 1  Not Estab  % Final    Immature Granulocytes (Absolute) 08/03/2020 0 0  0 0 - 0 1 x10E3/uL Final    Glucose, Random 08/03/2020 104* 65 - 99 mg/dL Final    BUN 08/03/2020 14  6 - 24 mg/dL Final    Creatinine 08/03/2020 1 15  0 76 - 1 27 mg/dL Final    eGFR Non African American 08/03/2020 74  >59 mL/min/1 73 Final    eGFR African American 08/03/2020 85  >59 mL/min/1 73 Final    SL AMB BUN/CREATININE RATIO 08/03/2020 12  9 - 20 Final    Sodium 08/03/2020 141  134 - 144 mmol/L Final    Potassium 08/03/2020 5 1  3 5 - 5 2 mmol/L Final    Chloride 08/03/2020 104  96 - 106 mmol/L Final    CO2 08/03/2020 24  20 - 29 mmol/L Final    CALCIUM 08/03/2020 9 7  8 7 - 10 2 mg/dL Final    Protein, Total 08/03/2020 6 9  6 0 - 8 5 g/dL Final    Albumin 08/03/2020 4 7  4 0 - 5 0 g/dL Final    Globulin, Total 08/03/2020 2 2  1 5 - 4 5 g/dL Final    Albumin/Globulin Ratio 08/03/2020 2 1  1 2 - 2 2 Final    TOTAL BILIRUBIN 08/03/2020 1 0  0 0 - 1 2 mg/dL Final    Alk Phos Isoenzymes 08/03/2020 62  39 - 117 IU/L Final    AST 08/03/2020 30  0 - 40 IU/L Final    ALT 08/03/2020 53* 0 - 44 IU/L Final    TSH 08/03/2020 3 190  0 450 - 4 500 uIU/mL Final    Hemoglobin A1C 08/03/2020 5 8* 4 8 - 5 6 % Final    Comment:          Prediabetes: 5 7 - 6 4           Diabetes: >6 4           Glycemic control for adults with diabetes: <7 0      Estimated Average Glucose 08/03/2020 120  mg/dL Final    Prostate Specific Antigen Total 08/03/2020 2 8  0 0 - 4 0 ng/mL Final    Comment: Roche ECLIA methodology  According to the American Urological Association, Serum PSA should  decrease and remain at undetectable levels after radical  prostatectomy  The AUA defines biochemical recurrence as an initial  PSA value 0 2 ng/mL or greater followed by a subsequent confirmatory  PSA value 0 2 ng/mL or greater  Values obtained with different assay methods or kits cannot be used  interchangeably  Results cannot be interpreted as absolute evidence  of the presence or absence of malignant disease   PSA, Free 08/03/2020 0 51  N/A ng/mL Final    Roche ECLIA methodology   PSA, Free Pct 08/03/2020 18 2  % Final    Comment: The table below lists the probability of prostate cancer for  men with non-suspicious BRIDGET results and total PSA between  4 and 10 ng/mL, by patient age Mariel Pavon, [facility],  [id number])                      % Free PSA       50-64 yr        65-75 yr                    0 00-10 00%        56%             55%                   10 01-15 00%        24%             35%                   15 01-20 00%        17%             23%                   20 01-25 00%        10%             20% >25 00%         5%              9%  Please note:  Meche et al did not make specific                recommendations regarding the use of                percent free PSA for any other population                of men        LDL Direct 08/03/2020 115* 0 - 99 mg/dL Final   Office Visit on 09/16/2019   Component Date Value Ref Range Status    TSH 12/11/2019 3 090  0 450 - 4 500 uIU/mL Final   Office Visit on 06/27/2019   Component Date Value Ref Range Status    OTHER 06/27/2019    Final    [phone]   Office Visit on 06/13/2019   Component Date Value Ref Range Status    OTHER 06/13/2019    Final    6/13/2019    OTHER 06/13/2019    Final    300,300,300,    Glucose, Random 09/10/2019 115* 65 - 99 mg/dL Final    BUN 09/10/2019 17  6 - 24 mg/dL Final    Creatinine 09/10/2019 1 04  0 76 - 1 27 mg/dL Final    eGFR Non  09/10/2019 84  >59 mL/min/1 73 Final    eGFR  09/10/2019 97  >59 mL/min/1 73 Final    SL AMB BUN/CREATININE RATIO 09/10/2019 16  9 - 20 Final    Sodium 09/10/2019 138  134 - 144 mmol/L Final    Potassium 09/10/2019 4 2  3 5 - 5 2 mmol/L Final    Chloride 09/10/2019 102  96 - 106 mmol/L Final    CO2 09/10/2019 24  20 - 29 mmol/L Final    CALCIUM 09/10/2019 9 4  8 7 - 10 2 mg/dL Final    Protein, Total 09/10/2019 6 2  6 0 - 8 5 g/dL Final    Albumin 09/10/2019 4 3  3 5 - 5 5 g/dL Final    Globulin, Total 09/10/2019 1 9  1 5 - 4 5 g/dL Final    Albumin/Globulin Ratio 09/10/2019 2 3* 1 2 - 2 2 Final    TOTAL BILIRUBIN 09/10/2019 0 9  0 0 - 1 2 mg/dL Final    Alk Phos Isoenzymes 09/10/2019 53  39 - 117 IU/L Final    AST 09/10/2019 21  0 - 40 IU/L Final    ALT 09/10/2019 31  0 - 44 IU/L Final    Cholesterol, Total 09/10/2019 199  100 - 199 mg/dL Final    Triglycerides 09/10/2019 498* 0 - 149 mg/dL Final    HDL 09/10/2019 36* >39 mg/dL Final    VLDL Cholesterol Calculated 09/10/2019 Comment  5 - 40 mg/dL Final    Comment: The calculation for the VLDL cholesterol is not valid when  triglyceride level is >400 mg/dL   LDL Calculated 09/10/2019 Comment  0 - 99 mg/dL Final    Comment: Triglyceride result indicated is too high for an accurate LDL  cholesterol estimation   T  Chol/HDL Ratio 09/10/2019 5 5* 0 0 - 5 0 ratio Final    Comment:                                   T  Chol/HDL Ratio                                              Men  Women                                1/2 Avg  Risk  3 4    3 3                                    Avg Risk  5 0    4 4                                 2X Avg  Risk  9 6    7 1                                 3X Avg  Risk 23 4   11 0      TSH 09/10/2019 8 030* 0 450 - 4 500 uIU/mL Final    LDL Direct 09/10/2019 101* 0 - 99 mg/dL Final    T4,Free (Direct) 09/10/2019 1 12  0 82 - 1 77 ng/dL Final   Office Visit on 06/03/2019   Component Date Value Ref Range Status    OTHER 06/03/2019    Final    298,700,[phone]   Office Visit on 11/29/2018   Component Date Value Ref Range Status    Cholesterol, Total 05/31/2019 202* 100 - 199 mg/dL Final    Triglycerides 05/31/2019 522* 0 - 149 mg/dL Final    HDL 05/31/2019 36* >39 mg/dL Final    VLDL Cholesterol Calculated 05/31/2019 Comment  5 - 40 mg/dL Final    Comment: The calculation for the VLDL cholesterol is not valid when  triglyceride level is >400 mg/dL   LDL Calculated 05/31/2019 Comment  0 - 99 mg/dL Final    Comment: Triglyceride result indicated is too high for an accurate LDL  cholesterol estimation   T  Chol/HDL Ratio 05/31/2019 5 6* 0 0 - 5 0 ratio Final    Comment:                                   T  Chol/HDL Ratio                                              Men  Women                                1/2 Avg  Risk  3 4    3 3                                    Avg Risk  5 0    4 4                                 2X Avg  Risk  9 6    7 1                                 3X Avg  Risk 23 4   11 0      Glucose, Random 05/31/2019 120* 65 - 99 mg/dL Final    BUN 05/31/2019 16  6 - 24 mg/dL Final    Creatinine 05/31/2019 0 95  0 76 - 1 27 mg/dL Final    eGFR Non  05/31/2019 94  >59 mL/min/1 73 Final    eGFR  05/31/2019 108  >59 mL/min/1 73 Final    SL AMB BUN/CREATININE RATIO 05/31/2019 17  9 - 20 Final    Sodium 05/31/2019 137  134 - 144 mmol/L Final    Potassium 05/31/2019 4 3  3 5 - 5 2 mmol/L Final    Chloride 05/31/2019 100  96 - 106 mmol/L Final    CO2 05/31/2019 24  20 - 29 mmol/L Final    CALCIUM 05/31/2019 9 2  8 7 - 10 2 mg/dL Final    Protein, Total 05/31/2019 6 5  6 0 - 8 5 g/dL Final    Albumin 05/31/2019 4 3  3 5 - 5 5 g/dL Final    Globulin, Total 05/31/2019 2 2  1 5 - 4 5 g/dL Final    Albumin/Globulin Ratio 05/31/2019 2 0  1 2 - 2 2 Final    TOTAL BILIRUBIN 05/31/2019 0 4  0 0 - 1 2 mg/dL Final    Alk Phos Isoenzymes 05/31/2019 55  39 - 117 IU/L Final    AST 05/31/2019 23  0 - 40 IU/L Final    ALT 05/31/2019 33  0 - 44 IU/L Final    White Blood Cell Count 05/31/2019 5 7  3 4 - 10 8 x10E3/uL Final    Red Blood Cell Count 05/31/2019 4 89  4 14 - 5 80 x10E6/uL Final    Hemoglobin 05/31/2019 14 2  13 0 - 17 7 g/dL Final    HCT 05/31/2019 41 8  37 5 - 51 0 % Final    MCV 05/31/2019 86  79 - 97 fL Final    MCH 05/31/2019 29 0  26 6 - 33 0 pg Final    MCHC 05/31/2019 34 0  31 5 - 35 7 g/dL Final    RDW 05/31/2019 13 5  12 3 - 15 4 % Final    Platelet Count 10/18/1154 220  150 - 450 x10E3/uL Final                  **Please note reference interval change**    Neutrophils 05/31/2019 49  Not Estab  % Final    Lymphocytes 05/31/2019 40  Not Estab  % Final    Monocytes 05/31/2019 7  Not Estab  % Final    Eosinophils 05/31/2019 2  Not Estab  % Final    Basophils PCT 05/31/2019 1  Not Estab  % Final    Neutrophils (Absolute) 05/31/2019 2 8  1 4 - 7 0 x10E3/uL Final    Lymphocytes (Absolute) 05/31/2019 2 3  0 7 - 3 1 x10E3/uL Final    Monocytes (Absolute) 05/31/2019 0 4  0 1 - 0 9 x10E3/uL Final    Eosinophils (Absolute) 05/31/2019 0 1  0 0 - 0 4 x10E3/uL Final    Basophils ABS 05/31/2019 0 1  0 0 - 0 2 x10E3/uL Final    Immature Granulocytes 05/31/2019 1  Not Estab  % Final    Immature Granulocytes (Absolute) 05/31/2019 0 0  0 0 - 0 1 x10E3/uL Final    TSH 05/31/2019 4 840* 0 450 - 4 500 uIU/mL Final    LDL Direct 05/31/2019 90  0 - 99 mg/dL Final    T4,Free (Direct) 05/31/2019 1 22  0 82 - 1 77 ng/dL Final   Orders Only on 10/25/2018   Component Date Value Ref Range Status    Cholesterol, Total 11/16/2018 183  100 - 199 mg/dL Final    Triglycerides 11/16/2018 173* 0 - 149 mg/dL Final    HDL 11/16/2018 44  >39 mg/dL Final    VLDL Cholesterol Calculated 11/16/2018 35  5 - 40 mg/dL Final    LDL Calculated 11/16/2018 104* 0 - 99 mg/dL Final    T  Chol/HDL Ratio 11/16/2018 4 2  0 0 - 5 0 ratio Final    Comment:                                   T  Chol/HDL Ratio                                              Men  Women                                1/2 Avg  Risk  3 4    3 3                                    Avg Risk  5 0    4 4                                 2X Avg  Risk  9 6    7 1                                 3X Avg  Risk 23 4   11 0      TSH 11/16/2018 5 660* 0 450 - 4 500 uIU/mL Final    White Blood Cell Count 11/16/2018 5 4  3 4 - 10 8 x10E3/uL Final    Red Blood Cell Count 11/16/2018 5 01  4 14 - 5 80 x10E6/uL Final    Hemoglobin 11/16/2018 14 9  13 0 - 17 7 g/dL Final    HCT 11/16/2018 43 4  37 5 - 51 0 % Final    MCV 11/16/2018 87  79 - 97 fL Final    MCH 11/16/2018 29 7  26 6 - 33 0 pg Final    MCHC 11/16/2018 34 3  31 5 - 35 7 g/dL Final    RDW 11/16/2018 13 1  12 3 - 15 4 % Final    Platelet Count 85/42/4936 256  150 - 379 x10E3/uL Final    Neutrophils 11/16/2018 44  Not Estab  % Final    Lymphocytes 11/16/2018 47  Not Estab  % Final    Monocytes 11/16/2018 7  Not Estab  % Final    Eosinophils 11/16/2018 2  Not Estab  % Final    Basophils PCT 11/16/2018 0  Not Estab  % Final  Neutrophils (Absolute) 11/16/2018 2 4  1 4 - 7 0 x10E3/uL Final    Lymphocytes (Absolute) 11/16/2018 2 6  0 7 - 3 1 x10E3/uL Final    Monocytes (Absolute) 11/16/2018 0 4  0 1 - 0 9 x10E3/uL Final    Eosinophils (Absolute) 11/16/2018 0 1  0 0 - 0 4 x10E3/uL Final    Basophils ABS 11/16/2018 0 0  0 0 - 0 2 x10E3/uL Final    Immature Granulocytes 11/16/2018 0  Not Estab  % Final    Immature Granulocytes (Absolute) 11/16/2018 0 0  0 0 - 0 1 x10E3/uL Final    Glucose, Random 11/16/2018 114* 65 - 99 mg/dL Final    BUN 11/16/2018 14  6 - 24 mg/dL Final    Creatinine 11/16/2018 1 11  0 76 - 1 27 mg/dL Final    eGFR Non African American 11/16/2018 78  >59 mL/min/1 73 Final    eGFR  11/16/2018 90  >59 mL/min/1 73 Final    SL AMB BUN/CREATININE RATIO 11/16/2018 13  9 - 20 Final    Sodium 11/16/2018 138  134 - 144 mmol/L Final    Potassium 11/16/2018 4 9  3 5 - 5 2 mmol/L Final    Chloride 11/16/2018 101  96 - 106 mmol/L Final    CO2 11/16/2018 24  20 - 29 mmol/L Final    CALCIUM 11/16/2018 9 5  8 7 - 10 2 mg/dL Final    Protein, Total 11/16/2018 6 8  6 0 - 8 5 g/dL Final    Albumin 11/16/2018 4 5  3 5 - 5 5 g/dL Final    Globulin, Total 11/16/2018 2 3  1 5 - 4 5 g/dL Final    Albumin/Globulin Ratio 11/16/2018 2 0  1 2 - 2 2 Final    TOTAL BILIRUBIN 11/16/2018 1 0  0 0 - 1 2 mg/dL Final    Alk Phos Isoenzymes 11/16/2018 59  39 - 117 IU/L Final    AST 11/16/2018 20  0 - 40 IU/L Final    ALT 11/16/2018 26  0 - 44 IU/L Final    Hemoglobin A1C 11/16/2018 5 7* 4 8 - 5 6 % Final    Comment:          Prediabetes: 5 7 - 6 4           Diabetes: >6 4           Glycemic control for adults with diabetes: <7 0      Estimated Average Glucose 11/16/2018 117  mg/dL Final    25-HYDROXY VIT D 11/16/2018 32 9  30 0 - 100 0 ng/mL Final    Comment: Vitamin D deficiency has been defined by the [place] of  [employer] and an Endocrine Society practice guideline as a  level of serum 25-OH vitamin D less than 20 ng/mL (1,2)  The Endocrine Society went on to further define vitamin D  insufficiency as a level between 21 and 29 ng/mL (2)  1  IOM ([place] [employer])  2010  Dietary reference     intakes for calcium and D  [facility]: The     ITN  2  Eran MF, Chris PLATT, Sarai SILVER, et al      Evaluation, treatment, and prevention of vitamin D     deficiency: an Endocrine Society clinical practice     guideline  JCEM  2011 Jul; 96(7):1911-30   T4,Free (Direct) 11/16/2018 1 20  0 82 - 1 77 ng/dL Final          Physical Exam  Constitutional:       Appearance: He is well-developed  HENT:      Right Ear: Tympanic membrane and ear canal normal       Left Ear: Tympanic membrane and ear canal normal    Neck:      Musculoskeletal: Normal range of motion and neck supple  Cardiovascular:      Rate and Rhythm: Normal rate and regular rhythm  Pulses: No decreased pulses  Heart sounds: Normal heart sounds  Pulmonary:      Effort: Pulmonary effort is normal  No respiratory distress  Breath sounds: Normal breath sounds  No wheezing or rales  Musculoskeletal: Normal range of motion  Skin:     General: Skin is warm and dry  Neurological:      Mental Status: He is alert and oriented to person, place, and time  Cranial Nerves: No cranial nerve deficit  Deep Tendon Reflexes: Reflexes are normal and symmetric  Psychiatric:         Behavior: Behavior normal          Thought Content: Thought content normal          Judgment: Judgment normal          BMI Counseling: Body mass index is 34 35 kg/m²  The BMI is above normal  Nutrition recommendations include reducing portion sizes and decreasing overall calorie intake  Exercise recommendations include moderate aerobic physical activity for 150 minutes/week

## 2020-09-29 ENCOUNTER — CONSULT (OUTPATIENT)
Dept: UROLOGY | Facility: HOSPITAL | Age: 50
End: 2020-09-29
Payer: COMMERCIAL

## 2020-09-29 VITALS
DIASTOLIC BLOOD PRESSURE: 88 MMHG | HEART RATE: 68 BPM | WEIGHT: 250 LBS | TEMPERATURE: 97.3 F | BODY MASS INDEX: 35 KG/M2 | SYSTOLIC BLOOD PRESSURE: 132 MMHG | HEIGHT: 71 IN

## 2020-09-29 DIAGNOSIS — R35.0 URINARY FREQUENCY: Primary | ICD-10-CM

## 2020-09-29 DIAGNOSIS — N52.9 ERECTILE DYSFUNCTION, UNSPECIFIED ERECTILE DYSFUNCTION TYPE: ICD-10-CM

## 2020-09-29 LAB
POST-VOID RESIDUAL VOLUME, ML POC: 100 ML
SL AMB  POCT GLUCOSE, UA: NORMAL
SL AMB LEUKOCYTE ESTERASE,UA: NORMAL
SL AMB POCT BILIRUBIN,UA: NORMAL
SL AMB POCT BLOOD,UA: NORMAL
SL AMB POCT CLARITY,UA: CLEAR
SL AMB POCT COLOR,UA: YELLOW
SL AMB POCT KETONES,UA: NORMAL
SL AMB POCT NITRITE,UA: NORMAL
SL AMB POCT PH,UA: 7
SL AMB POCT SPECIFIC GRAVITY,UA: 1
SL AMB POCT URINE PROTEIN: NORMAL
SL AMB POCT UROBILINOGEN: 0.2

## 2020-09-29 PROCEDURE — 51798 US URINE CAPACITY MEASURE: CPT | Performed by: UROLOGY

## 2020-09-29 PROCEDURE — 81002 URINALYSIS NONAUTO W/O SCOPE: CPT | Performed by: UROLOGY

## 2020-09-29 PROCEDURE — 99244 OFF/OP CNSLTJ NEW/EST MOD 40: CPT | Performed by: UROLOGY

## 2020-09-29 RX ORDER — TADALAFIL 5 MG/1
5 TABLET ORAL DAILY
Qty: 30 TABLET | Refills: 1 | Status: SHIPPED | OUTPATIENT
Start: 2020-09-29 | End: 2020-11-25 | Stop reason: SDUPTHER

## 2020-09-29 NOTE — PROGRESS NOTES
Assessment/Plan:    Urinary frequency  We discussed that his urinary symptoms are likely secondary to an enlarged prostate  We discussed treatment options including medications such as tamsulosin and daily Cialis as well as procedures  We discussed the daily Cialis would treat both his erectile dysfunction and his urinary symptoms  The patient wishes to proceed with this option  The medicine was prescribed and side effects were discussed  He will follow up in 6-8 weeks to re-evaluate his symptoms on the new medication and check a postvoid residual     Erectile dysfunction  Cialis was prescribed as described above  Diagnoses and all orders for this visit:    Urinary frequency  -     Ambulatory referral to Urology  -     POCT urine dip  -     POCT Measure PVR  -     tadalafil (CIALIS) 5 MG tablet; Take 1 tablet (5 mg total) by mouth daily    Erectile dysfunction, unspecified erectile dysfunction type  -     tadalafil (CIALIS) 5 MG tablet; Take 1 tablet (5 mg total) by mouth daily          Total visit time was 60 minutes of which over 50% was spent on counseling  Subjective:     Patient ID: Mohinder Hurst is a 47 y o  male    77-year-old male presents for evaluation of lower urinary tract symptoms  The patient states he has been having worsening symptoms for the past 6 months  His urinary symptoms are detailed below  He is also noticed a decrease in the quality of his erections and was recently prescribed Viagra by his primary doctor but has not filled the prescription yet  He denies any family history of prostate issues or cancer  He has no other complaints  The following portions of the patient's history were reviewed and updated as appropriate: allergies, current medications, past family history, past medical history, past social history, past surgical history and problem list     Review of Systems   Constitutional: Negative  HENT: Negative  Eyes: Negative      Respiratory: Negative  Cardiovascular: Negative  Gastrointestinal: Negative  Endocrine: Negative  Genitourinary:        As noted per HPI   Musculoskeletal: Negative  Skin: Negative  Allergic/Immunologic: Negative  Neurological: Negative  Hematological: Negative  Psychiatric/Behavioral: Negative  AUA SYMPTOM SCORE      Most Recent Value   AUA SYMPTOM SCORE   How often have you had a sensation of not emptying your bladder completely after you finished urinating? 3   How often have you had to urinate again less than two hours after you finished urinating? 4   How often have you found you stopped and started again several times when you urinate? 5   How often have you found it difficult to postpone urination? 3   How often have you had a weak urinary stream?  3   How often have you had to push or strain to begin urination? 2   How many times did you most typically get up to urinate from the time you went to bed at night until the time you got up in the morning? 1   Quality of Life: If you were to spend the rest of your life with your urinary condition just the way it is now, how would you feel about that?  3   AUA SYMPTOM SCORE  21              Objective:    Physical Exam  Vitals signs reviewed  Constitutional:       Appearance: He is well-developed  Neck:      Musculoskeletal: Normal range of motion  Pulmonary:      Effort: Pulmonary effort is normal    Abdominal:      General: Bowel sounds are normal  There is no distension  Palpations: Abdomen is soft  There is no mass  Tenderness: There is no abdominal tenderness  There is no guarding or rebound  Genitourinary:     Rectum: Normal       Comments: Unable to palpate the entire prostate but the apex was free of nodules  Prostate does seem enlarged  Musculoskeletal: Normal range of motion  Skin:     General: Skin is warm and dry  Neurological:      Mental Status: He is alert and oriented to person, place, and time  Results  Lab Results   Component Value Date    PSA 2 8 08/03/2020    PSA 2 2 07/27/2017     Lab Results   Component Value Date    GLUCOSE 121 (H) 07/27/2017    CALCIUM 9 6 07/27/2017     07/27/2017    K 5 1 08/03/2020    CO2 24 08/03/2020     08/03/2020    BUN 14 08/03/2020    CREATININE 1 15 08/03/2020     Lab Results   Component Value Date    WBC 5 8 08/03/2020    HGB 14 4 08/03/2020    HCT 42 9 08/03/2020    MCV 90 08/03/2020     08/03/2020       Recent Results (from the past 1 hour(s))   POCT urine dip    Collection Time: 09/29/20  8:45 AM   Result Value Ref Range    LEUKOCYTE ESTERASE,UA -     NITRITE,UA -     SL AMB POCT UROBILINOGEN 0 2     POCT URINE PROTEIN -      PH,UA 7 0     BLOOD,UA -     SPECIFIC GRAVITY,UA 1 005     KETONES,UA -     BILIRUBIN,UA -     GLUCOSE, UA -      COLOR,UA yellow     CLARITY,UA clear    POCT Measure PVR    Collection Time: 09/29/20  8:46 AM   Result Value Ref Range    POST-VOID RESIDUAL VOLUME, ML  mL   ]

## 2020-09-29 NOTE — ASSESSMENT & PLAN NOTE
We discussed that his urinary symptoms are likely secondary to an enlarged prostate  We discussed treatment options including medications such as tamsulosin and daily Cialis as well as procedures  We discussed the daily Cialis would treat both his erectile dysfunction and his urinary symptoms  The patient wishes to proceed with this option  The medicine was prescribed and side effects were discussed    He will follow up in 6-8 weeks to re-evaluate his symptoms on the new medication and check a postvoid residual

## 2020-09-30 ENCOUNTER — TELEPHONE (OUTPATIENT)
Dept: UROLOGY | Facility: AMBULATORY SURGERY CENTER | Age: 50
End: 2020-09-30

## 2020-10-13 ENCOUNTER — CONSULT (OUTPATIENT)
Dept: CARDIOLOGY CLINIC | Facility: CLINIC | Age: 50
End: 2020-10-13
Payer: COMMERCIAL

## 2020-10-13 VITALS
DIASTOLIC BLOOD PRESSURE: 90 MMHG | TEMPERATURE: 98.1 F | HEIGHT: 71 IN | BODY MASS INDEX: 34.86 KG/M2 | HEART RATE: 81 BPM | WEIGHT: 249 LBS | SYSTOLIC BLOOD PRESSURE: 138 MMHG

## 2020-10-13 DIAGNOSIS — I10 ESSENTIAL HYPERTENSION: Primary | ICD-10-CM

## 2020-10-13 DIAGNOSIS — R06.00 DYSPNEA ON EXERTION: ICD-10-CM

## 2020-10-13 DIAGNOSIS — E78.2 MIXED HYPERLIPIDEMIA: ICD-10-CM

## 2020-10-13 PROCEDURE — 3080F DIAST BP >= 90 MM HG: CPT | Performed by: INTERNAL MEDICINE

## 2020-10-13 PROCEDURE — 1036F TOBACCO NON-USER: CPT | Performed by: INTERNAL MEDICINE

## 2020-10-13 PROCEDURE — 3075F SYST BP GE 130 - 139MM HG: CPT | Performed by: INTERNAL MEDICINE

## 2020-10-13 PROCEDURE — 93000 ELECTROCARDIOGRAM COMPLETE: CPT | Performed by: INTERNAL MEDICINE

## 2020-10-13 PROCEDURE — 99244 OFF/OP CNSLTJ NEW/EST MOD 40: CPT | Performed by: INTERNAL MEDICINE

## 2020-10-13 RX ORDER — ROSUVASTATIN CALCIUM 10 MG/1
10 TABLET, COATED ORAL DAILY
Qty: 30 TABLET | Refills: 5 | Status: SHIPPED | OUTPATIENT
Start: 2020-10-13 | End: 2020-10-15

## 2020-10-15 ENCOUNTER — ANESTHESIA EVENT (OUTPATIENT)
Dept: GASTROENTEROLOGY | Facility: AMBULATORY SURGERY CENTER | Age: 50
End: 2020-10-15

## 2020-10-15 ENCOUNTER — HOSPITAL ENCOUNTER (OUTPATIENT)
Dept: GASTROENTEROLOGY | Facility: AMBULATORY SURGERY CENTER | Age: 50
Discharge: HOME | End: 2020-10-15
Payer: COMMERCIAL

## 2020-10-15 ENCOUNTER — ANESTHESIA (OUTPATIENT)
Dept: GASTROENTEROLOGY | Facility: AMBULATORY SURGERY CENTER | Age: 50
End: 2020-10-15

## 2020-10-15 ENCOUNTER — APPOINTMENT (OUTPATIENT)
Dept: LAB | Facility: HOSPITAL | Age: 50
End: 2020-10-15
Payer: COMMERCIAL

## 2020-10-15 VITALS
TEMPERATURE: 96.8 F | DIASTOLIC BLOOD PRESSURE: 76 MMHG | RESPIRATION RATE: 17 BRPM | OXYGEN SATURATION: 98 % | HEART RATE: 79 BPM | SYSTOLIC BLOOD PRESSURE: 119 MMHG

## 2020-10-15 VITALS — HEART RATE: 86 BPM

## 2020-10-15 DIAGNOSIS — Z12.11 SCREENING FOR COLON CANCER: ICD-10-CM

## 2020-10-15 PROBLEM — E66.9 OBESITY (BMI 30.0-34.9): Status: ACTIVE | Noted: 2020-10-15

## 2020-10-15 PROBLEM — E66.811 OBESITY (BMI 30.0-34.9): Status: ACTIVE | Noted: 2020-10-15

## 2020-10-15 PROCEDURE — 88305 TISSUE EXAM BY PATHOLOGIST: CPT | Performed by: PATHOLOGY

## 2020-10-15 PROCEDURE — 45380 COLONOSCOPY AND BIOPSY: CPT | Performed by: INTERNAL MEDICINE

## 2020-10-15 RX ORDER — SODIUM CHLORIDE 9 MG/ML
50 INJECTION, SOLUTION INTRAVENOUS CONTINUOUS
Status: DISCONTINUED | OUTPATIENT
Start: 2020-10-15 | End: 2020-10-19 | Stop reason: HOSPADM

## 2020-10-15 RX ORDER — MEPERIDINE HYDROCHLORIDE 50 MG/ML
12.5 INJECTION INTRAMUSCULAR; INTRAVENOUS; SUBCUTANEOUS
Status: CANCELLED | OUTPATIENT
Start: 2020-10-15

## 2020-10-15 RX ORDER — ONDANSETRON 2 MG/ML
4 INJECTION INTRAMUSCULAR; INTRAVENOUS ONCE AS NEEDED
Status: CANCELLED | OUTPATIENT
Start: 2020-10-15

## 2020-10-15 RX ORDER — HYDRALAZINE HYDROCHLORIDE 20 MG/ML
5 INJECTION INTRAMUSCULAR; INTRAVENOUS
Status: CANCELLED | OUTPATIENT
Start: 2020-10-15

## 2020-10-15 RX ORDER — FENTANYL CITRATE/PF 50 MCG/ML
12.5 SYRINGE (ML) INJECTION
Status: CANCELLED | OUTPATIENT
Start: 2020-10-15

## 2020-10-15 RX ORDER — METOCLOPRAMIDE HYDROCHLORIDE 5 MG/ML
10 INJECTION INTRAMUSCULAR; INTRAVENOUS ONCE AS NEEDED
Status: CANCELLED | OUTPATIENT
Start: 2020-10-15

## 2020-10-15 RX ORDER — PROMETHAZINE HYDROCHLORIDE 25 MG/ML
6.25 INJECTION, SOLUTION INTRAMUSCULAR; INTRAVENOUS ONCE AS NEEDED
Status: CANCELLED | OUTPATIENT
Start: 2020-10-15

## 2020-10-15 RX ORDER — LABETALOL 20 MG/4 ML (5 MG/ML) INTRAVENOUS SYRINGE
5
Status: CANCELLED | OUTPATIENT
Start: 2020-10-15

## 2020-10-15 RX ORDER — PROPOFOL 10 MG/ML
INJECTION, EMULSION INTRAVENOUS AS NEEDED
Status: DISCONTINUED | OUTPATIENT
Start: 2020-10-15 | End: 2020-10-15

## 2020-10-15 RX ADMIN — PROPOFOL 30 MG: 10 INJECTION, EMULSION INTRAVENOUS at 08:12

## 2020-10-15 RX ADMIN — PROPOFOL 30 MG: 10 INJECTION, EMULSION INTRAVENOUS at 08:08

## 2020-10-15 RX ADMIN — PROPOFOL 100 MG: 10 INJECTION, EMULSION INTRAVENOUS at 08:02

## 2020-10-15 RX ADMIN — PROPOFOL 20 MG: 10 INJECTION, EMULSION INTRAVENOUS at 08:10

## 2020-10-15 RX ADMIN — PROPOFOL 20 MG: 10 INJECTION, EMULSION INTRAVENOUS at 08:14

## 2020-10-15 RX ADMIN — PROPOFOL 50 MG: 10 INJECTION, EMULSION INTRAVENOUS at 08:04

## 2020-10-15 RX ADMIN — PROPOFOL 50 MG: 10 INJECTION, EMULSION INTRAVENOUS at 08:06

## 2020-10-15 RX ADMIN — SODIUM CHLORIDE 50 ML/HR: 9 INJECTION, SOLUTION INTRAVENOUS at 07:56

## 2020-11-03 ENCOUNTER — HOSPITAL ENCOUNTER (OUTPATIENT)
Dept: NON INVASIVE DIAGNOSTICS | Age: 50
Discharge: HOME | End: 2020-11-03
Payer: COMMERCIAL

## 2020-11-03 DIAGNOSIS — I10 ESSENTIAL HYPERTENSION: ICD-10-CM

## 2020-11-03 PROCEDURE — 93350 STRESS TTE ONLY: CPT

## 2020-11-03 PROCEDURE — 93351 STRESS TTE COMPLETE: CPT | Performed by: INTERNAL MEDICINE

## 2020-11-04 ENCOUNTER — TELEPHONE (OUTPATIENT)
Dept: CARDIOLOGY CLINIC | Facility: CLINIC | Age: 50
End: 2020-11-04

## 2020-11-04 LAB
CHEST PAIN STATEMENT: NORMAL
MAX DIASTOLIC BP: 82 MMHG
MAX HEART RATE: 160 BPM
MAX PREDICTED HEART RATE: 170 BPM
MAX. SYSTOLIC BP: 170 MMHG
PROTOCOL NAME: NORMAL
TARGET HR FORMULA: NORMAL
TEST INDICATION: NORMAL
TIME IN EXERCISE PHASE: NORMAL

## 2020-11-12 DIAGNOSIS — E78.2 MIXED HYPERLIPIDEMIA: Primary | ICD-10-CM

## 2020-11-12 RX ORDER — ROSUVASTATIN CALCIUM 10 MG/1
10 TABLET, COATED ORAL DAILY
Qty: 30 TABLET | Refills: 5 | Status: SHIPPED | OUTPATIENT
Start: 2020-11-12 | End: 2021-04-12

## 2020-11-25 ENCOUNTER — OFFICE VISIT (OUTPATIENT)
Dept: UROLOGY | Facility: HOSPITAL | Age: 50
End: 2020-11-25
Payer: COMMERCIAL

## 2020-11-25 VITALS
HEART RATE: 82 BPM | SYSTOLIC BLOOD PRESSURE: 128 MMHG | BODY MASS INDEX: 34.58 KG/M2 | HEIGHT: 71 IN | DIASTOLIC BLOOD PRESSURE: 88 MMHG | WEIGHT: 247 LBS

## 2020-11-25 DIAGNOSIS — N52.9 ERECTILE DYSFUNCTION, UNSPECIFIED ERECTILE DYSFUNCTION TYPE: ICD-10-CM

## 2020-11-25 DIAGNOSIS — R35.0 URINARY FREQUENCY: Primary | ICD-10-CM

## 2020-11-25 LAB — POST-VOID RESIDUAL VOLUME, ML POC: 12 ML

## 2020-11-25 PROCEDURE — 3074F SYST BP LT 130 MM HG: CPT | Performed by: NURSE PRACTITIONER

## 2020-11-25 PROCEDURE — 1036F TOBACCO NON-USER: CPT | Performed by: NURSE PRACTITIONER

## 2020-11-25 PROCEDURE — 3008F BODY MASS INDEX DOCD: CPT | Performed by: NURSE PRACTITIONER

## 2020-11-25 PROCEDURE — 99213 OFFICE O/P EST LOW 20 MIN: CPT | Performed by: NURSE PRACTITIONER

## 2020-11-25 PROCEDURE — 51798 US URINE CAPACITY MEASURE: CPT | Performed by: NURSE PRACTITIONER

## 2020-11-25 PROCEDURE — 3079F DIAST BP 80-89 MM HG: CPT | Performed by: NURSE PRACTITIONER

## 2020-11-25 RX ORDER — TADALAFIL 5 MG/1
5 TABLET ORAL DAILY
Qty: 90 TABLET | Refills: 3 | Status: SHIPPED | OUTPATIENT
Start: 2020-11-25 | End: 2021-11-09 | Stop reason: SDUPTHER

## 2020-12-22 DIAGNOSIS — F43.22 ADJUSTMENT DISORDER WITH ANXIETY: ICD-10-CM

## 2020-12-22 RX ORDER — BUSPIRONE HYDROCHLORIDE 15 MG/1
15 TABLET ORAL 2 TIMES DAILY
Qty: 180 TABLET | Refills: 0 | Status: SHIPPED | OUTPATIENT
Start: 2020-12-22 | End: 2021-03-11

## 2021-01-02 DIAGNOSIS — I10 BENIGN ESSENTIAL HTN: ICD-10-CM

## 2021-01-03 RX ORDER — LOSARTAN POTASSIUM 50 MG/1
TABLET ORAL
Qty: 30 TABLET | Refills: 1 | Status: SHIPPED | OUTPATIENT
Start: 2021-01-03 | End: 2021-03-10

## 2021-03-09 DIAGNOSIS — I10 BENIGN ESSENTIAL HTN: ICD-10-CM

## 2021-03-10 ENCOUNTER — TELEPHONE (OUTPATIENT)
Dept: FAMILY MEDICINE CLINIC | Facility: CLINIC | Age: 51
End: 2021-03-10

## 2021-03-10 RX ORDER — LOSARTAN POTASSIUM 50 MG/1
TABLET ORAL
Qty: 30 TABLET | Refills: 0 | Status: SHIPPED | OUTPATIENT
Start: 2021-03-10 | End: 2021-04-06

## 2021-03-10 NOTE — TELEPHONE ENCOUNTER
Pt is scheduled with [facility]  He needs to get labs done, orders were already put in by [facility]  He will  orders and get those done before his upcoming appt

## 2021-03-10 NOTE — TELEPHONE ENCOUNTER
Please call patient, I refilled his BP medication, he is due for a visit with any provider he chooses

## 2021-03-11 DIAGNOSIS — F43.22 ADJUSTMENT DISORDER WITH ANXIETY: ICD-10-CM

## 2021-03-11 RX ORDER — BUSPIRONE HYDROCHLORIDE 15 MG/1
TABLET ORAL
Qty: 60 TABLET | Refills: 2 | Status: SHIPPED | OUTPATIENT
Start: 2021-03-11 | End: 2021-06-05

## 2021-03-14 DIAGNOSIS — E03.9 HYPOTHYROIDISM, UNSPECIFIED TYPE: ICD-10-CM

## 2021-03-14 RX ORDER — LEVOTHYROXINE SODIUM 0.05 MG/1
TABLET ORAL
Qty: 30 TABLET | Refills: 3 | Status: SHIPPED | OUTPATIENT
Start: 2021-03-14 | End: 2021-07-05

## 2021-03-16 LAB
ALBUMIN SERPL-MCNC: 4.4 G/DL (ref 3.8–4.9)
ALBUMIN/GLOB SERPL: 2.1 {RATIO} (ref 1.2–2.2)
ALP SERPL-CCNC: 63 IU/L (ref 39–117)
ALT SERPL-CCNC: 24 IU/L (ref 0–44)
AST SERPL-CCNC: 19 IU/L (ref 0–40)
BILIRUB SERPL-MCNC: 0.9 MG/DL (ref 0–1.2)
BUN SERPL-MCNC: 13 MG/DL (ref 6–24)
BUN/CREAT SERPL: 12 (ref 9–20)
CALCIUM SERPL-MCNC: 9 MG/DL (ref 8.7–10.2)
CHLORIDE SERPL-SCNC: 100 MMOL/L (ref 96–106)
CHOLEST SERPL-MCNC: 156 MG/DL (ref 100–199)
CHOLEST/HDLC SERPL: 3.3 RATIO (ref 0–5)
CO2 SERPL-SCNC: 24 MMOL/L (ref 20–29)
CREAT SERPL-MCNC: 1.13 MG/DL (ref 0.76–1.27)
GLOBULIN SER-MCNC: 2.1 G/DL (ref 1.5–4.5)
GLUCOSE SERPL-MCNC: 110 MG/DL (ref 65–99)
HDLC SERPL-MCNC: 47 MG/DL
LDLC SERPL CALC-MCNC: 73 MG/DL (ref 0–99)
POTASSIUM SERPL-SCNC: 4.4 MMOL/L (ref 3.5–5.2)
PROT SERPL-MCNC: 6.5 G/DL (ref 6–8.5)
SL AMB EGFR AFRICAN AMERICAN: 87 ML/MIN/1.73
SL AMB EGFR NON AFRICAN AMERICAN: 75 ML/MIN/1.73
SL AMB VLDL CHOLESTEROL CALC: 36 MG/DL (ref 5–40)
SODIUM SERPL-SCNC: 138 MMOL/L (ref 134–144)
TRIGL SERPL-MCNC: 222 MG/DL (ref 0–149)

## 2021-03-19 ENCOUNTER — OFFICE VISIT (OUTPATIENT)
Dept: FAMILY MEDICINE CLINIC | Facility: CLINIC | Age: 51
End: 2021-03-19
Payer: COMMERCIAL

## 2021-03-19 VITALS
WEIGHT: 242.7 LBS | TEMPERATURE: 98.4 F | BODY MASS INDEX: 33.98 KG/M2 | HEIGHT: 71 IN | SYSTOLIC BLOOD PRESSURE: 124 MMHG | HEART RATE: 80 BPM | RESPIRATION RATE: 17 BRPM | DIASTOLIC BLOOD PRESSURE: 80 MMHG

## 2021-03-19 DIAGNOSIS — E03.8 OTHER SPECIFIED HYPOTHYROIDISM: ICD-10-CM

## 2021-03-19 DIAGNOSIS — R35.0 URINARY FREQUENCY: ICD-10-CM

## 2021-03-19 DIAGNOSIS — Z79.899 ENCOUNTER FOR LONG-TERM (CURRENT) USE OF MEDICATIONS: ICD-10-CM

## 2021-03-19 DIAGNOSIS — I10 ESSENTIAL HYPERTENSION: ICD-10-CM

## 2021-03-19 DIAGNOSIS — Z12.5 PROSTATE CANCER SCREENING: ICD-10-CM

## 2021-03-19 DIAGNOSIS — M62.830 BACK MUSCLE SPASM: ICD-10-CM

## 2021-03-19 DIAGNOSIS — E55.9 VITAMIN D DEFICIENCY: ICD-10-CM

## 2021-03-19 DIAGNOSIS — E66.9 OBESITY (BMI 30.0-34.9): ICD-10-CM

## 2021-03-19 DIAGNOSIS — E78.2 MIXED HYPERLIPIDEMIA: ICD-10-CM

## 2021-03-19 DIAGNOSIS — Z00.00 ANNUAL PHYSICAL EXAM: Primary | ICD-10-CM

## 2021-03-19 PROCEDURE — 1036F TOBACCO NON-USER: CPT | Performed by: NURSE PRACTITIONER

## 2021-03-19 PROCEDURE — 3074F SYST BP LT 130 MM HG: CPT | Performed by: NURSE PRACTITIONER

## 2021-03-19 PROCEDURE — 3725F SCREEN DEPRESSION PERFORMED: CPT | Performed by: NURSE PRACTITIONER

## 2021-03-19 PROCEDURE — 99213 OFFICE O/P EST LOW 20 MIN: CPT | Performed by: NURSE PRACTITIONER

## 2021-03-19 PROCEDURE — 3008F BODY MASS INDEX DOCD: CPT | Performed by: NURSE PRACTITIONER

## 2021-03-19 PROCEDURE — 3079F DIAST BP 80-89 MM HG: CPT | Performed by: NURSE PRACTITIONER

## 2021-03-19 PROCEDURE — 99396 PREV VISIT EST AGE 40-64: CPT | Performed by: NURSE PRACTITIONER

## 2021-03-19 RX ORDER — METHOCARBAMOL 500 MG/1
500 TABLET, FILM COATED ORAL 2 TIMES DAILY PRN
Qty: 30 TABLET | Refills: 1 | Status: SHIPPED | OUTPATIENT
Start: 2021-03-19

## 2021-03-19 NOTE — PATIENT INSTRUCTIONS
Wellness Visit for Adults   AMBULATORY CARE:   A wellness visit  is when you see your healthcare provider to get screened for health problems  Your healthcare provider will also give you advice on how to stay healthy  Write down your questions so you remember to ask them  Ask your healthcare provider how often you should have a wellness visit  What happens at a wellness visit:  Your healthcare provider will ask about your health, and your family history of health problems  This includes high blood pressure, heart disease, and cancer  He or she will ask if you have symptoms that concern you, if you smoke, and about your mood  You may also be asked about your intake of medicines, supplements, food, and alcohol  Any of the following may be done:  · Your weight  will be checked  Your height may also be checked so your body mass index (BMI) can be calculated  Your BMI shows if you are at a healthy weight  · Your blood pressure  and heart rate will be checked  Your temperature may also be checked  · Blood and urine tests  may be done  Blood tests may be done to check your cholesterol levels  Abnormal cholesterol levels increase your risk for heart disease and stroke  You may also need a blood or urine test to check for diabetes if you are at increased risk  Urine tests may be done to look for signs of an infection or kidney disease  · A physical exam  includes checking your heartbeat and lungs with a stethoscope  Your healthcare provider may also check your skin to look for sun damage  · Screening tests  may be recommended  A screening test is done to check for diseases that may not cause symptoms  The screening tests you may need depend on your age, gender, family history, and lifestyle habits  For example, colorectal screening may be recommended if you are 48years old or older  Screening tests you need if you are a woman:   · A Pap smear  is used to screen for cervical cancer   Pap smears are usually done every 3 to 5 years depending on your age  You may need them more often if you have had abnormal Pap smear test results in the past  Ask your healthcare provider how often you should have a Pap smear  · A mammogram  is an x-ray of your breasts to screen for breast cancer  Experts recommend mammograms every 2 years starting at age 48 years  You may need a mammogram at age 52 years or younger if you have an increased risk for breast cancer  Talk to your healthcare provider about when you should start having mammograms and how often you need them  Vaccines you may need:   · Get an influenza vaccine  every year  The influenza vaccine protects you from the flu  Several types of viruses cause the flu  The viruses change over time, so new vaccines are made each year  · Get a tetanus-diphtheria (Td) booster vaccine  every 10 years  This vaccine protects you against tetanus and diphtheria  Tetanus is a severe infection that may cause painful muscle spasms and lockjaw  Diphtheria is a severe bacterial infection that causes a thick covering in the back of your mouth and throat  · Get a human papillomavirus (HPV) vaccine  if you are female and aged 23 to 32 or male 23 to 24 and never received it  This vaccine protects you from HPV infection  HPV is the most common infection spread by sexual contact  HPV may also cause vaginal, penile, and anal cancers  · Get a pneumococcal vaccine  if you are aged 72 years or older  The pneumococcal vaccine is an injection given to protect you from pneumococcal disease  Pneumococcal disease is an infection caused by pneumococcal bacteria  The infection may cause pneumonia, meningitis, or an ear infection  · Get a shingles vaccine  if you are 60 or older, even if you have had shingles before  The shingles vaccine is an injection to protect you from the varicella-zoster virus  This is the same virus that causes chickenpox   Shingles is a painful rash that develops in people who had chickenpox or have been exposed to the virus  How to eat healthy:  My Plate is a model for planning healthy meals  It shows the types and amounts of foods that should go on your plate  Fruits and vegetables make up about half of your plate, and grains and protein make up the other half  A serving of dairy is included on the side of your plate  The amount of calories and serving sizes you need depends on your age, gender, weight, and height  Examples of healthy foods are listed below:  · Eat a variety of vegetables  such as dark green, red, and orange vegetables  You can also include canned vegetables low in sodium (salt) and frozen vegetables without added butter or sauces  · Eat a variety of fresh fruits , canned fruit in 100% juice, frozen fruit, and dried fruit  · Include whole grains  At least half of the grains you eat should be whole grains  Examples include whole-wheat bread, wheat pasta, brown rice, and whole-grain cereals such as oatmeal     · Eat a variety of protein foods such as seafood (fish and shellfish), lean meat, and poultry without skin (turkey and chicken)  Examples of lean meats include pork leg, shoulder, or tenderloin, and beef round, sirloin, tenderloin, and extra lean ground beef  Other protein foods include eggs and egg substitutes, beans, peas, soy products, nuts, and seeds  · Choose low-fat dairy products such as skim or 1% milk or low-fat yogurt, cheese, and cottage cheese  · Limit unhealthy fats  such as butter, hard margarine, and shortening  Exercise:  Exercise at least 30 minutes per day on most days of the week  Some examples of exercise include walking, biking, dancing, and swimming  You can also fit in more physical activity by taking the stairs instead of the elevator or parking farther away from stores  Include muscle strengthening activities 2 days each week  Regular exercise provides many health benefits   It helps you manage your weight, and decreases your risk for type 2 diabetes, heart disease, stroke, and high blood pressure  Exercise can also help improve your mood  Ask your healthcare provider about the best exercise plan for you  General health and safety guidelines:   · Do not smoke  Nicotine and other chemicals in cigarettes and cigars can cause lung damage  Ask your healthcare provider for information if you currently smoke and need help to quit  E-cigarettes or smokeless tobacco still contain nicotine  Talk to your healthcare provider before you use these products  · Limit alcohol  A drink of alcohol is 12 ounces of beer, 5 ounces of wine, or 1½ ounces of liquor  · Lose weight, if needed  Being overweight increases your risk of certain health conditions  These include heart disease, high blood pressure, type 2 diabetes, and certain types of cancer  · Protect your skin  Do not sunbathe or use tanning beds  Use sunscreen with a SPF 15 or higher  Apply sunscreen at least 15 minutes before you go outside  Reapply sunscreen every 2 hours  Wear protective clothing, hats, and sunglasses when you are outside  · Drive safely  Always wear your seatbelt  Make sure everyone in your car wears a seatbelt  A seatbelt can save your life if you are in an accident  Do not use your cell phone when you are driving  This could distract you and cause an accident  Pull over if you need to make a call or send a text message  · Practice safe sex  Use latex condoms if are sexually active and have more than one partner  Your healthcare provider may recommend screening tests for sexually transmitted infections (STIs)  · Wear helmets, lifejackets, and protective gear  Always wear a helmet when you ride a bike or motorcycle, go skiing, or play sports that could cause a head injury  Wear protective equipment when you play sports  Wear a lifejacket when you are on a boat or doing water sports      © Copyright [address] 2020 Information is for End User's use only and may not be sold, redistributed or otherwise used for commercial purposes  All illustrations and images included in CareNotes® are the copyrighted property of [employer]  or Tanya Blackwood  The above information is an  only  It is not intended as medical advice for individual conditions or treatments  Talk to your doctor, nurse or pharmacist before following any medical regimen to see if it is safe and effective for you  Back Pain   WHAT YOU NEED TO KNOW:   Back pain is common  It can be caused by many conditions, such as arthritis or the breakdown of spinal discs  Your risk for back pain is increased by injuries, lack of activity, or repeated bending and twisting  You may feel sore or stiff on one or both sides of your back  The pain may spread to your buttocks or thighs  DISCHARGE INSTRUCTIONS:   Return to the emergency department if:   · You have pain, numbness, or weakness in one or both legs  · Your pain becomes so severe that you cannot walk  · You cannot control your urine or bowel movements  · You have severe back pain with chest pain  · You have severe back pain, nausea, and vomiting  · You have severe back pain that spreads to your side or genital area  Contact your healthcare provider if:   · You have back pain that does not get better with rest and pain medicine  · You have a fever  · You have pain that worsens when you are on your back or when you rest     · You have pain that worsens when you cough or sneeze  · You lose weight without trying  · You have questions or concerns about your condition or care  Medicines:   · NSAIDs  help decrease swelling and pain  This medicine is available with or without a doctor's order  NSAIDs can cause stomach bleeding or kidney problems in certain people  If you take blood thinner medicine, always ask your healthcare provider if NSAIDs are safe for you   Always read the medicine label and follow directions  · Acetaminophen  decreases pain and fever  It is available without a doctor's order  Ask how much to take and how often to take it  Follow directions  Read the labels of all other medicines you are using to see if they also contain acetaminophen, or ask your doctor or pharmacist  Acetaminophen can cause liver damage if not taken correctly  Do not use more than 4 grams (4,000 milligrams) total of acetaminophen in one day  · Muscle relaxers  help decrease muscle spasms and back pain  · Prescription pain medicine  may be given  Ask your healthcare provider how to take this medicine safely  Some prescription pain medicines contain acetaminophen  Do not take other medicines that contain acetaminophen without talking to your healthcare provider  Too much acetaminophen may cause liver damage  Prescription pain medicine may cause constipation  Ask your healthcare provider how to prevent or treat constipation  · Take your medicine as directed  Contact your healthcare provider if you think your medicine is not helping or if you have side effects  Tell him or her if you are allergic to any medicine  Keep a list of the medicines, vitamins, and herbs you take  Include the amounts, and when and why you take them  Bring the list or the pill bottles to follow-up visits  Carry your medicine list with you in case of an emergency  How to manage your back pain:   · Apply ice  on your back for 15 to 20 minutes every hour or as directed  Use an ice pack, or put crushed ice in a plastic bag  Cover it with a towel before you apply it to your skin  Ice helps prevent tissue damage and decreases pain  · Apply heat  on your back for 20 to 30 minutes every 2 hours for as many days as directed  Heat helps decrease pain and muscle spasms  · Stay active  as much as you can without causing more pain  Bed rest could make your back pain worse  Avoid heavy lifting until your pain is gone      · Go to physical therapy as directed  A physical therapist can teach you exercises to help improve movement and strength, and to decrease pain  Follow up with your healthcare provider in 2 weeks, or as directed:  Write down your questions so you remember to ask them during your visits  © Copyright [address] Information is for End User's use only and may not be sold, redistributed or otherwise used for commercial purposes  All illustrations and images included in CareNotes® are the copyrighted property of [employer]  [facility] [address]   The above information is an  only  It is not intended as medical advice for individual conditions or treatments  Talk to your doctor, nurse or pharmacist before following any medical regimen to see if it is safe and effective for you

## 2021-03-19 NOTE — PROGRESS NOTES
ADULT ANNUAL PHYSICAL  [facility]    NAME: Tita Mai  AGE: 45 y o  SEX: male  : 1970     DATE: 3/19/2021     Assessment and Plan:  1  Get Covid vaccine when able to   2  Obtain fasting labs when able  3  Adacel on next visit  4  Colonoscopy UTD, due   5  F/u in 6 months for recheck     Problem List Items Addressed This Visit     None      Visit Diagnoses     Annual physical exam    -  Primary          Immunizations and preventive care screenings were discussed with patient today  Appropriate education was printed on patient's after visit summary  Counseling:  Alcohol/drug use: discussed moderation in alcohol intake, the recommendations for healthy alcohol use, and avoidance of illicit drug use  Dental Health: discussed importance of regular tooth brushing, flossing, and dental visits  Injury prevention: discussed safety/seat belts, safety helmets, smoke detectors, carbon dioxide detectors, and smoking near bedding or upholstery  Sexual health: discussed sexually transmitted diseases, partner selection, use of condoms, avoidance of unintended pregnancy, and contraceptive alternatives  · Exercise: the importance of regular exercise/physical activity was discussed  Recommend exercise 3-5 times per week for at least 30 minutes  BMI Counseling: Body mass index is 34 09 kg/m²  The BMI is above normal  Nutrition recommendations include decreasing portion sizes, encouraging healthy choices of fruits and vegetables, decreasing fast food intake, moderation in carbohydrate intake, increasing intake of lean protein and reducing intake of saturated and trans fat  Exercise recommendations include moderate physical activity 150 minutes/week  No pharmacotherapy was ordered  No follow-ups on file       Chief Complaint:     Chief Complaint   Patient presents with    Hypertension    Vitamin D Deficiency    Hyperlipidemia      History of Present Illness:     Adult Annual Physical   Patient here for a comprehensive physical exam  The patient reports no problems  Diet and Physical Activity  · Diet/Nutrition: poor diet, limited junk food, consuming 3-5 servings of fruits/vegetables daily and adequate fiber intake  · Exercise: walking and 3-4 times a week on average  Depression Screening  PHQ-9 Depression Screening    PHQ-9:   Frequency of the following problems over the past two weeks:      Little interest or pleasure in doing things: 0 - not at all  Feeling down, depressed, or hopeless: 0 - not at all  PHQ-2 Score: 0       General Health  · Sleep: sleeps poorly and gets 4-6 hours of sleep on average  · Hearing: normal - bilateral   · Vision: most recent eye exam >1 year ago and wears glasses  · Dental: no dental visits for >1 year, brushes teeth twice daily and flosses teeth occasionally   Health  · Symptoms include: none     Review of Systems:     Review of Systems   Constitutional: Negative  Negative for chills and fever  HENT: Negative  Negative for ear pain and sore throat  Eyes: Negative  Negative for pain and visual disturbance  Respiratory: Negative  Negative for cough and shortness of breath  Cardiovascular: Negative  Negative for chest pain, palpitations and leg swelling  Gastrointestinal: Negative  Negative for abdominal distention, abdominal pain, constipation, diarrhea and vomiting  Genitourinary: Negative  Negative for difficulty urinating, dysuria, frequency and hematuria  Musculoskeletal: Positive for back pain (yfn low back pain, chronic)  Negative for arthralgias  Skin: Negative for color change and rash  Neurological: Negative  Negative for dizziness, seizures, syncope and numbness  Hematological: Negative  Psychiatric/Behavioral: Negative  All other systems reviewed and are negative       Past Medical History:     Past Medical History: Diagnosis Date    Acute sinusitis 2011    Anxiety     Cellulitis 2011    Cellulitis of neck 2010    Facial cellulitis 2010    Hypercholesterolemia     Hypertension     Lymph nodes enlarged     Sensory disturbance       Past Surgical History:     Past Surgical History:   Procedure Laterality Date    NO PAST SURGERIES        Family History:     Family History   Problem Relation Age of Onset    Hypertension Mother     Hyperlipidemia Mother     Coronary artery disease Father     Heart attack Brother         B/D 52 MI    Leukemia Brother     Leukemia Son     Mental illness Neg Hx     Substance Abuse Neg Hx     Alcohol abuse Neg Hx       Social History:        Social History     Socioeconomic History    Marital status: /Civil Union     Spouse name: None    Number of children: None    Years of education: None    Highest education level: None   Occupational History    None   Social Needs    Financial resource strain: None    Food insecurity     Worry: None     Inability: None    Transportation needs     Medical: None     Non-medical: None   Tobacco Use    Smoking status: Never Smoker    Smokeless tobacco: Never Used    Tobacco comment: DENIED: HISTORY OF TOBACCO USE   Substance and Sexual Activity    Alcohol use:  Yes     Alcohol/week: 2 0 standard drinks     Types: 2 Cans of beer per week     Comment: SOCIAL; 2 BEERS A DAY AS PER ALL SCRIPTS     Drug use: No    Sexual activity: None   Lifestyle    Physical activity     Days per week: None     Minutes per session: None    Stress: None   Relationships    Social connections     Talks on phone: None     Gets together: None     Attends Gnosticism service: None     Active member of club or organization: None     Attends meetings of clubs or organizations: None     Relationship status: None    Intimate partner violence     Fear of current or ex partner: None     Emotionally abused: None     Physically abused: None     Forced sexual activity: None   Other Topics Concern    None   Social History Narrative    Always uses seat belt    Caffeine use: 4 cups of coffee qd    Has smoke detectors      Current Medications:     Current Outpatient Medications   Medication Sig Dispense Refill    albuterol (2 5 mg/3 mL) 0 083 % nebulizer solution Take 1 vial (2 5 mg total) by nebulization every 6 (six) hours as needed for wheezing or shortness of breath 25 vial 1    busPIRone (BUSPAR) 15 mg tablet TAKE 1 TABLET BY MOUTH TWICE A DAY 60 tablet 2    levothyroxine 50 mcg tablet TAKE 1 TABLET BY MOUTH EVERY DAY 30 tablet 3    losartan (COZAAR) 50 mg tablet TAKE ONE TABLET BY MOUTH EVERY DAY 30 tablet 0    orphenadrine (NORFLEX) 100 mg tablet TAKE 1 TABLET BY MOUTH TWICE A DAY (Patient taking differently: as needed ) 60 tablet 0    rosuvastatin (CRESTOR) 10 MG tablet Take 1 tablet (10 mg total) by mouth daily 30 tablet 5    tadalafil (CIALIS) 5 MG tablet Take 1 tablet (5 mg total) by mouth daily 90 tablet 3     No current facility-administered medications for this visit  Allergies: Allergies   Allergen Reactions    Ace Inhibitors Cough    Penicillins Other (See Comments)     Other reaction(s): unkown, childhood rxn      Physical Exam:     /80 (BP Location: Left arm, Patient Position: Sitting, Cuff Size: Large)   Pulse 80   Temp 98 4 °F (36 9 °C) (Oral)   Resp 17   Ht 5' 10 75" (1 797 m)   Wt 110 kg (242 lb 11 2 oz)   BMI 34 09 kg/m²     Physical Exam  Vitals signs and nursing note reviewed  Constitutional:       General: He is not in acute distress  Appearance: Normal appearance  He is well-developed  He is not ill-appearing  HENT:      Head: Normocephalic and atraumatic  Eyes:      Conjunctiva/sclera: Conjunctivae normal    Neck:      Musculoskeletal: Normal range of motion and neck supple  Vascular: No carotid bruit  Cardiovascular:      Rate and Rhythm: Normal rate and regular rhythm  Pulses: Normal pulses  Heart sounds: Normal heart sounds  No murmur  Pulmonary:      Effort: Pulmonary effort is normal  No respiratory distress  Breath sounds: Normal breath sounds  No wheezing  Abdominal:      General: There is no distension  Palpations: Abdomen is soft  There is no mass  Tenderness: There is no abdominal tenderness  There is no guarding or rebound  Hernia: No hernia is present  Musculoskeletal: Normal range of motion  General: No swelling, tenderness, deformity or signs of injury  Right lower leg: No edema  Left lower leg: No edema  Lymphadenopathy:      Cervical: No cervical adenopathy  Skin:     General: Skin is warm and dry  Capillary Refill: Capillary refill takes less than 2 seconds  Coloration: Skin is not pale  Neurological:      General: No focal deficit present  Mental Status: He is alert and oriented to person, place, and time  Mental status is at baseline  Sensory: No sensory deficit  Motor: No weakness  Gait: Gait normal    Psychiatric:         Mood and Affect: Mood normal          Behavior: Behavior normal          Thought Content:  Thought content normal          Judgment: Judgment normal           JUNIOR Mcdonald  [address]

## 2021-03-19 NOTE — PROGRESS NOTES
Assessment/Plan:     Diagnoses and all orders for this visit:    Essential hypertension  -     CBC and differential; Future  -     Comprehensive metabolic panel; Future  -     CBC and differential  -     Comprehensive metabolic panel    BP great today at 124/80  Continue Losartan  EKG done at Cardiology  Recheck in 6 months  Other specified hypothyroidism  -     TSH, 3rd generation with Free T4 reflex; Future  -     TSH, 3rd generation with Free T4 reflex    Currently stable on Levothyroxine 89LIG without complications  Last TSH 3 19 in 08/20  We will recheck levels prior to next visit in 6 months  Mixed hyperlipidemia  -     Lipid panel; Future  -     Lipid panel    Cholesterol is better, good job! Total cholesterol down to 156 from 214  Triglycerides down from 376 to 222  LDL now 73 from 115  Continue diet, exercise & Crestor  Lipid panel in 6 months  Encounter for long-term (current) use of medications  -     TSH, 3rd generation with Free T4 reflex; Future  -     CBC and differential; Future  -     Lipid panel; Future  -     Comprehensive metabolic panel; Future  -     TSH, 3rd generation with Free T4 reflex  -     CBC and differential  -     Lipid panel  -     Comprehensive metabolic panel    Prostate cancer screening  -     PSA, total and free; Future  -     PSA, total and free    Last PSA 2 8 in 08/20  We will check this prior to next visit in 09/21  Back muscle spasm  -     methocarbamol (ROBAXIN) 500 mg tablet; Take 1 tablet (500 mg total) by mouth 2 (two) times a day as needed for muscle spasms    Chronic for patient, especially after a work day  Not interested in ortho eval or steroid injections  Would like to hold off on PT for right now  Encouraged heat application in evening for muscle relaxation & Robaxin PRN  Pt encouraged to call office if this does not help or if he would be interested in PT  Obesity (BMI 30 0-34  9)      We discussed lifestyle modifications through diet & exercise  Urinary frequency      Managed by Urology per pt  Pt on Cialis in the evening  Pt to f/u in 6 months with fasting labs prior or sooner PRN  He may get his Adacel vaccine at his next visit in September 2021  Subjective:      Patient ID: Susan Roman is a 45 y o  male  Pt presents for his 6 month recheck of chronic conditions  He has felt well & has been trying to stay safe during pandemic  His only complaint today is chronic low back pain with muscle spasms  This has occurred for years  Pt has hx of steroid injections x1 occurrence, but "didn't like the feel of it" & would like to avoid this if possible  The following portions of the patient's history were reviewed and updated as appropriate: allergies, current medications, past family history, past medical history, past social history, past surgical history and problem list     Review of Systems   Constitutional: Negative  Negative for chills and fever  HENT: Negative  Negative for ear pain and sore throat  Eyes: Negative  Negative for pain and visual disturbance  Respiratory: Negative  Negative for cough and shortness of breath  Cardiovascular: Negative  Negative for chest pain, palpitations and leg swelling  Gastrointestinal: Negative  Negative for abdominal distention, abdominal pain, constipation, diarrhea, nausea and vomiting  Genitourinary: Negative  Negative for difficulty urinating, dysuria and hematuria  Musculoskeletal: Positive for back pain (chronic, low back pain w/ muscle spasms in evening)  Negative for arthralgias  Skin: Negative for color change and rash  Neurological: Negative  Negative for seizures and syncope  Psychiatric/Behavioral: Negative  All other systems reviewed and are negative          Objective:      /80 (BP Location: Left arm, Patient Position: Sitting, Cuff Size: Large)   Pulse 80   Temp 98 4 °F (36 9 °C) (Oral)   Resp 17   Ht 5' 10 75" (1 797 m)   Wt 110 kg (242 lb 11 2 oz)   BMI 34 09 kg/m²          Physical Exam  Vitals signs reviewed  Constitutional:       General: He is not in acute distress  Appearance: Normal appearance  He is not ill-appearing  Neck:      Musculoskeletal: Normal range of motion  Vascular: No carotid bruit  Cardiovascular:      Rate and Rhythm: Normal rate and regular rhythm  Pulses: Normal pulses  Heart sounds: Normal heart sounds  No murmur  Pulmonary:      Effort: Pulmonary effort is normal       Breath sounds: Normal breath sounds  No wheezing  Abdominal:      General: Abdomen is flat  Bowel sounds are normal  There is no distension  Palpations: Abdomen is soft  There is no mass  Tenderness: There is no abdominal tenderness  There is no guarding or rebound  Hernia: No hernia is present  Musculoskeletal: Normal range of motion  General: No swelling, tenderness, deformity or signs of injury  Right lower leg: No edema  Left lower leg: No edema  Lymphadenopathy:      Cervical: No cervical adenopathy  Skin:     General: Skin is warm  Capillary Refill: Capillary refill takes less than 2 seconds  Neurological:      General: No focal deficit present  Mental Status: He is alert and oriented to person, place, and time  Mental status is at baseline  Motor: No weakness  Coordination: Coordination normal       Gait: Gait normal    Psychiatric:         Mood and Affect: Mood normal          Behavior: Behavior normal          Thought Content:  Thought content normal          Judgment: Judgment normal

## 2021-04-05 DIAGNOSIS — I10 BENIGN ESSENTIAL HTN: ICD-10-CM

## 2021-04-06 RX ORDER — LOSARTAN POTASSIUM 50 MG/1
TABLET ORAL
Qty: 30 TABLET | Refills: 3 | Status: SHIPPED | OUTPATIENT
Start: 2021-04-06 | End: 2021-08-06

## 2021-04-08 DIAGNOSIS — Z23 ENCOUNTER FOR IMMUNIZATION: ICD-10-CM

## 2021-04-10 DIAGNOSIS — E78.2 MIXED HYPERLIPIDEMIA: ICD-10-CM

## 2021-04-12 RX ORDER — ROSUVASTATIN CALCIUM 10 MG/1
TABLET, COATED ORAL
Qty: 30 TABLET | Refills: 5 | Status: SHIPPED | OUTPATIENT
Start: 2021-04-12 | End: 2021-10-11

## 2021-06-05 DIAGNOSIS — F43.22 ADJUSTMENT DISORDER WITH ANXIETY: ICD-10-CM

## 2021-06-05 RX ORDER — BUSPIRONE HYDROCHLORIDE 15 MG/1
TABLET ORAL
Qty: 60 TABLET | Refills: 2 | Status: SHIPPED | OUTPATIENT
Start: 2021-06-05 | End: 2021-09-07

## 2021-07-03 DIAGNOSIS — E03.9 HYPOTHYROIDISM, UNSPECIFIED TYPE: ICD-10-CM

## 2021-07-05 RX ORDER — LEVOTHYROXINE SODIUM 0.05 MG/1
TABLET ORAL
Qty: 30 TABLET | Refills: 3 | Status: SHIPPED | OUTPATIENT
Start: 2021-07-05 | End: 2021-11-09

## 2021-08-06 DIAGNOSIS — I10 BENIGN ESSENTIAL HTN: ICD-10-CM

## 2021-08-06 RX ORDER — LOSARTAN POTASSIUM 50 MG/1
TABLET ORAL
Qty: 30 TABLET | Refills: 3 | Status: SHIPPED | OUTPATIENT
Start: 2021-08-06 | End: 2021-12-10

## 2021-09-04 DIAGNOSIS — F43.22 ADJUSTMENT DISORDER WITH ANXIETY: ICD-10-CM

## 2021-09-07 RX ORDER — BUSPIRONE HYDROCHLORIDE 15 MG/1
TABLET ORAL
Qty: 60 TABLET | Refills: 2 | Status: SHIPPED | OUTPATIENT
Start: 2021-09-07 | End: 2021-12-06

## 2021-09-20 ENCOUNTER — RA CDI HCC (OUTPATIENT)
Dept: OTHER | Facility: HOSPITAL | Age: 51
End: 2021-09-20

## 2021-09-20 NOTE — PROGRESS NOTES
[facility]  coding opportunities       Chart reviewed, no opportunity found: CHART REVIEWED, NO OPPORTUNITY FOUND                        Patients insurance company: [employer] (Medicare Advantage and Commercial)

## 2021-09-22 LAB
ALBUMIN SERPL-MCNC: 4.7 G/DL (ref 3.8–4.9)
ALBUMIN/GLOB SERPL: 2.6 {RATIO} (ref 1.2–2.2)
ALP SERPL-CCNC: 67 IU/L (ref 44–121)
ALT SERPL-CCNC: 33 IU/L (ref 0–44)
AST SERPL-CCNC: 23 IU/L (ref 0–40)
BASOPHILS # BLD AUTO: 0.1 X10E3/UL (ref 0–0.2)
BASOPHILS NFR BLD AUTO: 1 %
BILIRUB SERPL-MCNC: 1 MG/DL (ref 0–1.2)
BUN SERPL-MCNC: 14 MG/DL (ref 6–24)
BUN/CREAT SERPL: 13 (ref 9–20)
CALCIUM SERPL-MCNC: 9.4 MG/DL (ref 8.7–10.2)
CHLORIDE SERPL-SCNC: 100 MMOL/L (ref 96–106)
CHOLEST SERPL-MCNC: 156 MG/DL (ref 100–199)
CHOLEST/HDLC SERPL: 3.9 RATIO (ref 0–5)
CO2 SERPL-SCNC: 24 MMOL/L (ref 20–29)
CREAT SERPL-MCNC: 1.06 MG/DL (ref 0.76–1.27)
EOSINOPHIL # BLD AUTO: 0.2 X10E3/UL (ref 0–0.4)
EOSINOPHIL NFR BLD AUTO: 3 %
ERYTHROCYTE [DISTWIDTH] IN BLOOD BY AUTOMATED COUNT: 12.9 % (ref 11.6–15.4)
GLOBULIN SER-MCNC: 1.8 G/DL (ref 1.5–4.5)
GLUCOSE SERPL-MCNC: 124 MG/DL (ref 65–99)
HCT VFR BLD AUTO: 41.4 % (ref 37.5–51)
HDLC SERPL-MCNC: 40 MG/DL
HGB BLD-MCNC: 14.4 G/DL (ref 13–17.7)
IMM GRANULOCYTES # BLD: 0 X10E3/UL (ref 0–0.1)
IMM GRANULOCYTES NFR BLD: 1 %
LDLC SERPL CALC-MCNC: 82 MG/DL (ref 0–99)
LYMPHOCYTES # BLD AUTO: 2.2 X10E3/UL (ref 0.7–3.1)
LYMPHOCYTES NFR BLD AUTO: 41 %
MCH RBC QN AUTO: 29.6 PG (ref 26.6–33)
MCHC RBC AUTO-ENTMCNC: 34.8 G/DL (ref 31.5–35.7)
MCV RBC AUTO: 85 FL (ref 79–97)
MONOCYTES # BLD AUTO: 0.4 X10E3/UL (ref 0.1–0.9)
MONOCYTES NFR BLD AUTO: 7 %
NEUTROPHILS # BLD AUTO: 2.5 X10E3/UL (ref 1.4–7)
NEUTROPHILS NFR BLD AUTO: 47 %
PLATELET # BLD AUTO: 222 X10E3/UL (ref 150–450)
POTASSIUM SERPL-SCNC: 4.5 MMOL/L (ref 3.5–5.2)
PROT SERPL-MCNC: 6.5 G/DL (ref 6–8.5)
PSA FREE MFR SERPL: 16.8 %
PSA FREE SERPL-MCNC: 0.64 NG/ML
PSA SERPL-MCNC: 3.8 NG/ML (ref 0–4)
RBC # BLD AUTO: 4.86 X10E6/UL (ref 4.14–5.8)
SL AMB EGFR AFRICAN AMERICAN: 93 ML/MIN/1.73
SL AMB EGFR NON AFRICAN AMERICAN: 81 ML/MIN/1.73
SL AMB VLDL CHOLESTEROL CALC: 34 MG/DL (ref 5–40)
SODIUM SERPL-SCNC: 139 MMOL/L (ref 134–144)
TRIGL SERPL-MCNC: 199 MG/DL (ref 0–149)
TSH SERPL DL<=0.005 MIU/L-ACNC: 3.33 UIU/ML (ref 0.45–4.5)
WBC # BLD AUTO: 5.3 X10E3/UL (ref 3.4–10.8)

## 2021-09-27 ENCOUNTER — OFFICE VISIT (OUTPATIENT)
Dept: FAMILY MEDICINE CLINIC | Facility: CLINIC | Age: 51
End: 2021-09-27
Payer: COMMERCIAL

## 2021-09-27 VITALS
BODY MASS INDEX: 35.25 KG/M2 | HEIGHT: 71 IN | SYSTOLIC BLOOD PRESSURE: 132 MMHG | RESPIRATION RATE: 16 BRPM | HEART RATE: 89 BPM | DIASTOLIC BLOOD PRESSURE: 90 MMHG | WEIGHT: 251.8 LBS

## 2021-09-27 DIAGNOSIS — E78.2 MIXED HYPERLIPIDEMIA: ICD-10-CM

## 2021-09-27 DIAGNOSIS — I10 ESSENTIAL HYPERTENSION: Primary | ICD-10-CM

## 2021-09-27 DIAGNOSIS — R10.13 DYSPEPSIA: ICD-10-CM

## 2021-09-27 DIAGNOSIS — Z23 NEED FOR TETANUS, DIPHTHERIA, AND ACELLULAR PERTUSSIS (TDAP) VACCINE: ICD-10-CM

## 2021-09-27 DIAGNOSIS — R05.3 CHRONIC COUGH: ICD-10-CM

## 2021-09-27 DIAGNOSIS — E03.8 OTHER SPECIFIED HYPOTHYROIDISM: ICD-10-CM

## 2021-09-27 DIAGNOSIS — R35.0 URINARY FREQUENCY: ICD-10-CM

## 2021-09-27 DIAGNOSIS — F43.22 ADJUSTMENT DISORDER WITH ANXIETY: ICD-10-CM

## 2021-09-27 PROBLEM — E66.811 OBESITY (BMI 30.0-34.9): Status: RESOLVED | Noted: 2020-10-15 | Resolved: 2021-09-27

## 2021-09-27 PROBLEM — E66.9 OBESITY (BMI 30.0-34.9): Status: RESOLVED | Noted: 2020-10-15 | Resolved: 2021-09-27

## 2021-09-27 PROCEDURE — 1036F TOBACCO NON-USER: CPT | Performed by: NURSE PRACTITIONER

## 2021-09-27 PROCEDURE — 99214 OFFICE O/P EST MOD 30 MIN: CPT | Performed by: NURSE PRACTITIONER

## 2021-09-27 PROCEDURE — 3725F SCREEN DEPRESSION PERFORMED: CPT | Performed by: NURSE PRACTITIONER

## 2021-09-27 PROCEDURE — 90471 IMMUNIZATION ADMIN: CPT

## 2021-09-27 PROCEDURE — 90715 TDAP VACCINE 7 YRS/> IM: CPT

## 2021-09-27 PROCEDURE — 3008F BODY MASS INDEX DOCD: CPT | Performed by: NURSE PRACTITIONER

## 2021-09-27 RX ORDER — FAMOTIDINE 20 MG/1
20 TABLET, FILM COATED ORAL DAILY
Qty: 30 TABLET | Refills: 1 | Status: SHIPPED | OUTPATIENT
Start: 2021-09-27 | End: 2021-12-29

## 2021-09-27 NOTE — PROGRESS NOTES
Assessment/Plan:     Diagnoses and all orders for this visit:    Essential hypertension    BP slightly elevated today at OV at 132/90  Considering patient's 30 lbs weight gain, this may be contributing to elevated BP number  Pt encouraged to continue to watch diet and exercise to help decrease BP  Avoiding salty foods may help as well  Pt to RTO in 6 months for his annual physical and recheck  BMI 35 0-35 9,adult    We discussed appropriate lifestyle recommendations & goal BMI range  Pt was educated on appropriate diet and exercise modifications  Adjustment disorder with anxiety    Stable with Buspar, continue same  Mixed hyperlipidemia    Total cholesterol 156, Triglycerides 199 and LDL 82 on 09/21/21  Pt is encouraged to continue Crestor 10mg QD  Dyspepsia  -     famotidine (PEPCID) 20 mg tablet; Take 1 tablet (20 mg total) by mouth daily    We will trial 2 week course of Pepcid  Work on decreasing intake of coffee, alcohol and spicy foods  Pt encouraged to eat small meals throughout the day and avoid heavy meal before bedtime  Pt ordered to contact office with update in 2 weeks to let us know how is symptoms are doing  Need for tetanus, diphtheria, and acellular pertussis (Tdap) vaccine  -     TDAP VACCINE GREATER THAN OR EQUAL TO 6YO IM    Other specified hypothyroidism    Last TSH 3 33 on 09/21/21  Continue Levothyroxine 50 mcg dosing  Urinary frequency    Stable on Cialis 5mg daily  Continue Same  Chronic cough      GERD etiology? We will trial 2 week trial of Pepcid  Pt encouraged to continue to exercise and diet  He is to contact our office in 2 weeks if his cough is not improved  Patient states they understand and agree with treatment plan  Pt to f/u in 6 months for recheck or sooner PRN  Subjective:      Patient ID: Renetta Meléndez is a 45 y o  male  Pt presents for 6 month recheck of chronic conditions including HTN, hypothyroidism and hypercholesterolemia    Pt notes he has felt more fatigued over the last few months and does state he has gained approximately 30 lbs since covid quarantine  He has hx of sleep apnea but notes that he was never able to adjust to sleeping with the CPAP machine  He admits his diet and exercise routine has not been the best lately  Pt also admits to a chronic cough that and some reflux issues since he has gained weight  Additionally, pt drinks approx 20-40oz of coffee daily, consumes beer in the evening, has 1 large meal at night and does enjoy spicy foods  He has used Zyrtec in the past for chronic cough but does not feel like this has helped  The following portions of the patient's history were reviewed and updated as appropriate: allergies, current medications, past family history, past medical history, past social history, past surgical history and problem list     Review of Systems   Constitutional: Positive for unexpected weight change (30 lb weight gain since covid 2020)  Negative for chills and fever  HENT: Negative for ear pain and sore throat  Eyes: Negative for pain and visual disturbance  Respiratory: Negative for cough and shortness of breath  Cardiovascular: Negative for chest pain and palpitations  Gastrointestinal: Negative for abdominal pain and vomiting  Dyspepsia over the last several months   Genitourinary: Negative for dysuria and hematuria  Musculoskeletal: Negative for arthralgias and back pain  Skin: Negative for color change and rash  Neurological: Negative for seizures and syncope  Psychiatric/Behavioral: Positive for sleep disturbance (sleep apnea, does not use CPAP)  All other systems reviewed and are negative  Objective:      /90   Pulse 89   Resp 16   Ht 5' 10 5" (1 791 m)   Wt 114 kg (251 lb 12 8 oz)   BMI 35 62 kg/m²          Physical Exam  Vitals reviewed  Constitutional:       General: He is not in acute distress  Appearance: Normal appearance  He is obese  He is not ill-appearing  HENT:      Mouth/Throat:      Pharynx: No oropharyngeal exudate or posterior oropharyngeal erythema  Cardiovascular:      Rate and Rhythm: Normal rate and regular rhythm  Pulses: Normal pulses  Heart sounds: Normal heart sounds  No murmur heard  Pulmonary:      Effort: Pulmonary effort is normal  No respiratory distress  Breath sounds: Normal breath sounds  No wheezing  Abdominal:      General: Abdomen is flat  Bowel sounds are normal  There is no distension  Palpations: Abdomen is soft  There is no mass  Tenderness: There is no abdominal tenderness  There is no guarding or rebound  Hernia: No hernia is present  Musculoskeletal:      Cervical back: Normal range of motion  Skin:     General: Skin is warm  Capillary Refill: Capillary refill takes less than 2 seconds  Neurological:      General: No focal deficit present  Mental Status: He is alert and oriented to person, place, and time  Psychiatric:         Mood and Affect: Mood normal          Behavior: Behavior normal          Thought Content: Thought content normal          Judgment: Judgment normal          BMI Counseling: Body mass index is 35 62 kg/m²  The BMI is above normal  Nutrition recommendations include reducing portion sizes, decreasing overall calorie intake, 3-5 servings of fruits/vegetables daily, reducing fast food intake, consuming healthier snacks, decreasing soda and/or juice intake and moderation in carbohydrate intake  Exercise recommendations include moderate aerobic physical activity for 150 minutes/week

## 2021-10-01 ENCOUNTER — TELEPHONE (OUTPATIENT)
Dept: FAMILY MEDICINE CLINIC | Facility: CLINIC | Age: 51
End: 2021-10-01

## 2021-10-10 DIAGNOSIS — E78.2 MIXED HYPERLIPIDEMIA: ICD-10-CM

## 2021-10-11 RX ORDER — ROSUVASTATIN CALCIUM 10 MG/1
TABLET, COATED ORAL
Qty: 30 TABLET | Refills: 1 | Status: SHIPPED | OUTPATIENT
Start: 2021-10-11 | End: 2021-12-08

## 2021-10-15 ENCOUNTER — OFFICE VISIT (OUTPATIENT)
Dept: GASTROENTEROLOGY | Facility: CLINIC | Age: 51
End: 2021-10-15
Payer: COMMERCIAL

## 2021-10-15 ENCOUNTER — TELEPHONE (OUTPATIENT)
Dept: GASTROENTEROLOGY | Facility: CLINIC | Age: 51
End: 2021-10-15

## 2021-10-15 VITALS
SYSTOLIC BLOOD PRESSURE: 128 MMHG | BODY MASS INDEX: 35.14 KG/M2 | DIASTOLIC BLOOD PRESSURE: 84 MMHG | HEIGHT: 71 IN | WEIGHT: 251 LBS | HEART RATE: 88 BPM

## 2021-10-15 DIAGNOSIS — Z86.010 HISTORY OF COLON POLYPS: ICD-10-CM

## 2021-10-15 DIAGNOSIS — R14.0 BLOATING: ICD-10-CM

## 2021-10-15 DIAGNOSIS — K21.9 GASTROESOPHAGEAL REFLUX DISEASE, UNSPECIFIED WHETHER ESOPHAGITIS PRESENT: Primary | ICD-10-CM

## 2021-10-15 DIAGNOSIS — R11.0 NAUSEA: ICD-10-CM

## 2021-10-15 PROCEDURE — 99213 OFFICE O/P EST LOW 20 MIN: CPT | Performed by: NURSE PRACTITIONER

## 2021-10-15 PROCEDURE — 1036F TOBACCO NON-USER: CPT | Performed by: NURSE PRACTITIONER

## 2021-10-15 PROCEDURE — 3008F BODY MASS INDEX DOCD: CPT | Performed by: NURSE PRACTITIONER

## 2021-10-20 ENCOUNTER — ANESTHESIA EVENT (OUTPATIENT)
Dept: GASTROENTEROLOGY | Facility: AMBULATORY SURGERY CENTER | Age: 51
End: 2021-10-20

## 2021-10-20 ENCOUNTER — HOSPITAL ENCOUNTER (OUTPATIENT)
Dept: GASTROENTEROLOGY | Facility: AMBULATORY SURGERY CENTER | Age: 51
Discharge: HOME | End: 2021-10-20
Payer: COMMERCIAL

## 2021-10-20 ENCOUNTER — ANESTHESIA (OUTPATIENT)
Dept: GASTROENTEROLOGY | Facility: AMBULATORY SURGERY CENTER | Age: 51
End: 2021-10-20

## 2021-10-20 VITALS
DIASTOLIC BLOOD PRESSURE: 72 MMHG | OXYGEN SATURATION: 97 % | TEMPERATURE: 96.8 F | RESPIRATION RATE: 22 BRPM | SYSTOLIC BLOOD PRESSURE: 113 MMHG | HEART RATE: 85 BPM

## 2021-10-20 DIAGNOSIS — K21.9 GASTROESOPHAGEAL REFLUX DISEASE, UNSPECIFIED WHETHER ESOPHAGITIS PRESENT: ICD-10-CM

## 2021-10-20 DIAGNOSIS — R14.0 BLOATING: ICD-10-CM

## 2021-10-20 DIAGNOSIS — K21.00 GASTROESOPHAGEAL REFLUX DISEASE WITH ESOPHAGITIS WITHOUT HEMORRHAGE: Primary | ICD-10-CM

## 2021-10-20 DIAGNOSIS — R11.0 NAUSEA: ICD-10-CM

## 2021-10-20 PROCEDURE — 43239 EGD BIOPSY SINGLE/MULTIPLE: CPT | Performed by: INTERNAL MEDICINE

## 2021-10-20 RX ORDER — LIDOCAINE HYDROCHLORIDE 10 MG/ML
INJECTION, SOLUTION EPIDURAL; INFILTRATION; INTRACAUDAL; PERINEURAL AS NEEDED
Status: DISCONTINUED | OUTPATIENT
Start: 2021-10-20 | End: 2021-10-20

## 2021-10-20 RX ORDER — SODIUM CHLORIDE, SODIUM LACTATE, POTASSIUM CHLORIDE, CALCIUM CHLORIDE 600; 310; 30; 20 MG/100ML; MG/100ML; MG/100ML; MG/100ML
50 INJECTION, SOLUTION INTRAVENOUS CONTINUOUS
Status: DISCONTINUED | OUTPATIENT
Start: 2021-10-20 | End: 2021-10-24 | Stop reason: HOSPADM

## 2021-10-20 RX ORDER — PANTOPRAZOLE SODIUM 40 MG/1
40 TABLET, DELAYED RELEASE ORAL DAILY
Qty: 30 TABLET | Refills: 12 | Status: SHIPPED | OUTPATIENT
Start: 2021-10-20 | End: 2022-01-26

## 2021-10-20 RX ORDER — PROPOFOL 10 MG/ML
INJECTION, EMULSION INTRAVENOUS AS NEEDED
Status: DISCONTINUED | OUTPATIENT
Start: 2021-10-20 | End: 2021-10-20

## 2021-10-20 RX ADMIN — SODIUM CHLORIDE, SODIUM LACTATE, POTASSIUM CHLORIDE, CALCIUM CHLORIDE 50 ML/HR: 600; 310; 30; 20 INJECTION, SOLUTION INTRAVENOUS at 07:03

## 2021-10-20 RX ADMIN — LIDOCAINE HYDROCHLORIDE 100 MG: 10 INJECTION, SOLUTION EPIDURAL; INFILTRATION; INTRACAUDAL; PERINEURAL at 07:27

## 2021-10-20 RX ADMIN — PROPOFOL 100 MG: 10 INJECTION, EMULSION INTRAVENOUS at 07:27

## 2021-10-20 RX ADMIN — PROPOFOL 50 MG: 10 INJECTION, EMULSION INTRAVENOUS at 07:30

## 2021-10-20 RX ADMIN — PROPOFOL 80 MG: 10 INJECTION, EMULSION INTRAVENOUS at 07:32

## 2021-10-20 RX ADMIN — PROPOFOL 30 MG: 10 INJECTION, EMULSION INTRAVENOUS at 07:31

## 2021-10-20 RX ADMIN — PROPOFOL 70 MG: 10 INJECTION, EMULSION INTRAVENOUS at 07:33

## 2021-10-20 RX ADMIN — PROPOFOL 20 MG: 10 INJECTION, EMULSION INTRAVENOUS at 07:28

## 2021-10-22 ENCOUNTER — TELEPHONE (OUTPATIENT)
Dept: GASTROENTEROLOGY | Facility: CLINIC | Age: 51
End: 2021-10-22

## 2021-10-29 ENCOUNTER — TELEPHONE (OUTPATIENT)
Dept: FAMILY MEDICINE CLINIC | Facility: CLINIC | Age: 51
End: 2021-10-29

## 2021-11-09 DIAGNOSIS — R35.0 URINARY FREQUENCY: ICD-10-CM

## 2021-11-09 DIAGNOSIS — N52.9 ERECTILE DYSFUNCTION, UNSPECIFIED ERECTILE DYSFUNCTION TYPE: ICD-10-CM

## 2021-11-09 DIAGNOSIS — E03.9 HYPOTHYROIDISM, UNSPECIFIED TYPE: ICD-10-CM

## 2021-11-09 RX ORDER — TADALAFIL 5 MG/1
TABLET ORAL
Qty: 30 TABLET | Refills: 5 | Status: SHIPPED | OUTPATIENT
Start: 2021-11-09 | End: 2021-12-07 | Stop reason: SDUPTHER

## 2021-11-09 RX ORDER — LEVOTHYROXINE SODIUM 0.05 MG/1
TABLET ORAL
Qty: 30 TABLET | Refills: 2 | Status: SHIPPED | OUTPATIENT
Start: 2021-11-09 | End: 2022-01-30

## 2021-11-23 ENCOUNTER — TELEPHONE (OUTPATIENT)
Dept: UROLOGY | Facility: AMBULATORY SURGERY CENTER | Age: 51
End: 2021-11-23

## 2021-12-05 DIAGNOSIS — F43.22 ADJUSTMENT DISORDER WITH ANXIETY: ICD-10-CM

## 2021-12-06 RX ORDER — BUSPIRONE HYDROCHLORIDE 15 MG/1
TABLET ORAL
Qty: 60 TABLET | Refills: 2 | Status: SHIPPED | OUTPATIENT
Start: 2021-12-06 | End: 2022-03-01

## 2021-12-07 ENCOUNTER — OFFICE VISIT (OUTPATIENT)
Dept: UROLOGY | Facility: HOSPITAL | Age: 51
End: 2021-12-07
Payer: COMMERCIAL

## 2021-12-07 VITALS
DIASTOLIC BLOOD PRESSURE: 80 MMHG | HEART RATE: 80 BPM | HEIGHT: 71 IN | BODY MASS INDEX: 34.58 KG/M2 | WEIGHT: 247 LBS | SYSTOLIC BLOOD PRESSURE: 128 MMHG

## 2021-12-07 DIAGNOSIS — N52.9 ERECTILE DYSFUNCTION, UNSPECIFIED ERECTILE DYSFUNCTION TYPE: ICD-10-CM

## 2021-12-07 DIAGNOSIS — R10.9 FLANK PAIN: Primary | ICD-10-CM

## 2021-12-07 DIAGNOSIS — Z12.5 PROSTATE CANCER SCREENING: ICD-10-CM

## 2021-12-07 DIAGNOSIS — R35.0 URINARY FREQUENCY: ICD-10-CM

## 2021-12-07 PROCEDURE — 99213 OFFICE O/P EST LOW 20 MIN: CPT | Performed by: NURSE PRACTITIONER

## 2021-12-07 PROCEDURE — 1036F TOBACCO NON-USER: CPT | Performed by: NURSE PRACTITIONER

## 2021-12-07 PROCEDURE — 3008F BODY MASS INDEX DOCD: CPT | Performed by: NURSE PRACTITIONER

## 2021-12-07 RX ORDER — TADALAFIL 5 MG/1
5 TABLET ORAL DAILY
Qty: 30 TABLET | Refills: 11 | Status: SHIPPED | OUTPATIENT
Start: 2021-12-07

## 2021-12-08 DIAGNOSIS — E78.2 MIXED HYPERLIPIDEMIA: ICD-10-CM

## 2021-12-08 RX ORDER — ROSUVASTATIN CALCIUM 10 MG/1
TABLET, COATED ORAL
Qty: 30 TABLET | Refills: 1 | Status: SHIPPED | OUTPATIENT
Start: 2021-12-08 | End: 2022-02-01

## 2021-12-09 ENCOUNTER — HOSPITAL ENCOUNTER (OUTPATIENT)
Dept: ULTRASOUND IMAGING | Facility: HOSPITAL | Age: 51
Discharge: HOME | End: 2021-12-09
Payer: COMMERCIAL

## 2021-12-09 DIAGNOSIS — R10.9 FLANK PAIN: ICD-10-CM

## 2021-12-09 PROCEDURE — 76770 US EXAM ABDO BACK WALL COMP: CPT

## 2021-12-10 DIAGNOSIS — I10 BENIGN ESSENTIAL HTN: ICD-10-CM

## 2021-12-10 RX ORDER — LOSARTAN POTASSIUM 50 MG/1
TABLET ORAL
Qty: 30 TABLET | Refills: 3 | Status: SHIPPED | OUTPATIENT
Start: 2021-12-10 | End: 2022-04-20

## 2021-12-13 ENCOUNTER — TELEPHONE (OUTPATIENT)
Dept: UROLOGY | Facility: AMBULATORY SURGERY CENTER | Age: 51
End: 2021-12-13

## 2021-12-29 DIAGNOSIS — R10.13 DYSPEPSIA: ICD-10-CM

## 2021-12-29 RX ORDER — FAMOTIDINE 20 MG/1
TABLET, FILM COATED ORAL
Qty: 30 TABLET | Refills: 1 | Status: SHIPPED | OUTPATIENT
Start: 2021-12-29 | End: 2022-03-28

## 2022-01-26 ENCOUNTER — OFFICE VISIT (OUTPATIENT)
Dept: GASTROENTEROLOGY | Facility: CLINIC | Age: 52
End: 2022-01-26
Payer: COMMERCIAL

## 2022-01-26 VITALS
SYSTOLIC BLOOD PRESSURE: 120 MMHG | BODY MASS INDEX: 35.14 KG/M2 | HEIGHT: 71 IN | WEIGHT: 251 LBS | DIASTOLIC BLOOD PRESSURE: 84 MMHG

## 2022-01-26 DIAGNOSIS — R10.11 ABDOMINAL PAIN, RIGHT UPPER QUADRANT: ICD-10-CM

## 2022-01-26 DIAGNOSIS — Z86.010 PERSONAL HISTORY OF COLONIC POLYPS: ICD-10-CM

## 2022-01-26 DIAGNOSIS — K21.01 GASTROESOPHAGEAL REFLUX DISEASE WITH ESOPHAGITIS AND HEMORRHAGE: Primary | ICD-10-CM

## 2022-01-26 DIAGNOSIS — K21.00 GASTROESOPHAGEAL REFLUX DISEASE WITH ESOPHAGITIS WITHOUT HEMORRHAGE: ICD-10-CM

## 2022-01-26 DIAGNOSIS — R14.0 BLOATING SYMPTOM: ICD-10-CM

## 2022-01-26 PROBLEM — Z86.0100 PERSONAL HISTORY OF COLONIC POLYPS: Status: ACTIVE | Noted: 2022-01-26

## 2022-01-26 PROCEDURE — 3008F BODY MASS INDEX DOCD: CPT | Performed by: INTERNAL MEDICINE

## 2022-01-26 PROCEDURE — 1036F TOBACCO NON-USER: CPT | Performed by: INTERNAL MEDICINE

## 2022-01-26 PROCEDURE — 99214 OFFICE O/P EST MOD 30 MIN: CPT | Performed by: INTERNAL MEDICINE

## 2022-01-26 RX ORDER — PANTOPRAZOLE SODIUM 40 MG/1
40 TABLET, DELAYED RELEASE ORAL DAILY
Qty: 30 TABLET | Refills: 12 | Status: SHIPPED | OUTPATIENT
Start: 2022-01-26 | End: 2022-02-25

## 2022-01-26 NOTE — LETTER
January 27, 2022     Bess Jacobson [address] [address]    Patient: Adolfo Abraham   YOB: 1970   Date of Visit: 1/26/2022       Dear Dr Nyla Cedeño: Thank you for referring Adolfo Abraham to me for evaluation  Below are my notes for this consultation  If you have questions, please do not hesitate to call me  I look forward to following your patient along with you  Sincerely,        Gloria Linda MD        CC: No Recipients  Gloria Linda MD  1/27/2022  3:17 PM  Sign when Signing Visit  [id number] - Outpatient Follow-up Note  Adolfo Abraham 45 y o  male MRN: [id number]  Encounter: [id number]    ASSESSMENT AND PLAN:      1  Gastroesophageal reflux disease without  esophagitis and hemorrhage    --Patient with GERD symptoms  Upper endoscopy in the fall did reveal LA grade a to B esophagitis  He has been on pantoprazole with excellent results  Would continue the same-  Refill  Medication  as needed  -  Has been taking both pantoprazole and famotidine with good results    2  Bloating symptom  -- negative for celiac by biopsy  Patient does report he has to watch his diet a little bit more  -  Consider carbohydrates containing garlic, onions that can cause gaseousness and bloating  3  Abdominal pain, right upper quadrant  -- patient with a 2 month history of right-sided abdominal pain  He did have recent kidney ultrasound which was negative  Does not seem to be related to eating or bowel movements  Seems to be worse with motion and movement  Quite possibly patient has a musculoskeletal issue with the pain as he suspects  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 it  Position sometimes causes the discomfort  Nevertheless it has been persistent  Will image with a CT scan oral and IV contrast-    - Basic metabolic panel; Future  - CT abdomen w contrast; Future      4   Personal history of colonic polyps  -- patient up-to-date with respect to colon cancer screening  Did have an adenoma removed  Recall colonoscopy 2025      Followup Appointment: 1 year   ______________________________________________________________________    Chief Complaint   Patient presents with    Follow-up     still having occasional pain issue     HPI:  Patient returns to the office for follow-up visit after his recent endoscopic procedure and evaluation of her GERD and abdominal bloating  Patient reports that since he was seen in the office and had his medications adjusted he is doing quite well  He has been taking pantoprazole 40 mg daily and famotidine at night  He no longer has any significant problems with heartburn or indigestion  Upper endoscopy did reveal visible esophagitis at time of procedure  Patient also had complained about bloating or his last office visit  He was checked for celiac which was negative  He is trying to eat a more healthy diet lose a little weight  Reports the bloating is better as well  Patient does report over the last 2 months he has had relatively frequent abdominal pain on the right side  Seems to be related to position  There is no nausea vomiting associated  It is not related to eating  Not relieved by having a bowel movement  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 and will notice sometimes when he bends over he will have a sharp fleeting pain  Will last for a while and then subsequently go away  His wife has been asking him to get this evaluated because of its persistence  Does not please add a doing a bit would going about his usual activities  Lastly patient did have a colonoscopy 2020  Patient did have adenomatous polyps removed  To be due for colonoscopy again in 2025  Is not have any bowel related issues        Historical Information   Past Medical History:   Diagnosis Date    Acute sinusitis 2011    Anxiety     Cellulitis 2011    Cellulitis of neck 2010    Colon polyp     Facial cellulitis 2010    Hypercholesterolemia     Lymph nodes enlarged     Sensory disturbance     Sleep apnea      Past Surgical History:   Procedure Laterality Date    COLONOSCOPY       Social History     Substance and Sexual Activity   Alcohol Use Yes    Alcohol/week: 2 0 standard drinks    Types: 2 Cans of beer per week    Comment: SOCIAL; 2 BEERS A DAY AS PER ALL SCRIPTS      Social History     Substance and Sexual Activity   Drug Use No     Social History     Tobacco Use   Smoking Status Never Smoker   Smokeless Tobacco Never Used   Tobacco Comment    DENIED: HISTORY OF TOBACCO USE     Family History   Problem Relation Age of Onset    Hypertension Mother     Hyperlipidemia Mother     Coronary artery disease Father     Heart attack Brother         B/D 52 MI    Leukemia Brother     Leukemia Son     Mental illness Neg Hx     Substance Abuse Neg Hx     Alcohol abuse Neg Hx     Colon polyps Neg Hx     Colon cancer Neg Hx          Current Outpatient Medications:     busPIRone (BUSPAR) 15 mg tablet    famotidine (PEPCID) 20 mg tablet    levothyroxine 50 mcg tablet    losartan (COZAAR) 50 mg tablet    methocarbamol (ROBAXIN) 500 mg tablet    pantoprazole (PROTONIX) 40 mg tablet    rosuvastatin (CRESTOR) 10 MG tablet    tadalafil (CIALIS) 5 MG tablet  Allergies   Allergen Reactions    Ace Inhibitors Cough    Penicillins Other (See Comments)     Other reaction(s): unkown, childhood rxn     Reviewed medications and allergies and updated as indicated    PHYSICAL EXAM:    Blood pressure 120/84, height 5' 10 5" (1 791 m), weight 114 kg (251 lb)  Body mass index is 35 51 kg/m²  General Appearance: NAD, cooperative, alert  Eyes: Anicteric, conjunctiva pink  ENT:  Normocephalic, atraumatic, normal mucosa      Neck:  Supple, symmetrical, trachea midline  Resp:  Clear to auscultation bilaterally; no rales, rhonchi or wheezing; respirations unlabored   CV:  S1 S2, Regular rate and rhythm; no murmur, rub, or gallop  GI:  Soft, non-tender, non-distended; normal bowel sounds; no masses, no organomegaly-no masses noted    Rectal: Deferred  Musculoskeletal: No cyanosis, clubbing or edema  Normal ROM    Skin:  No jaundice, rashes, or lesions   Heme/Lymph: No palpable cervical lymphadenopathy  Psych: Normal affect, good eye contact  Neuro: No gross deficits, AAOx3    Lab Results:   Lab Results   Component Value Date    WBC 5 3 09/21/2021    HGB 14 4 09/21/2021    HCT 41 4 09/21/2021    MCV 85 09/21/2021     09/21/2021     Lab Results   Component Value Date     07/27/2017    K 4 5 09/21/2021     09/21/2021    CO2 24 09/21/2021    BUN 14 09/21/2021    CREATININE 1 06 09/21/2021    GLUCOSE 121 (H) 07/27/2017    CALCIUM 9 6 07/27/2017    AST 23 09/21/2021    ALT 33 09/21/2021    ALKPHOS 63 07/27/2017    PROT 6 9 07/27/2017    BILITOT 1 0 07/27/2017

## 2022-01-26 NOTE — PATIENT INSTRUCTIONS
[id number] - Outpatient Follow-up Note  Darell Khanna 45 y o  male MRN: [id number]  Encounter: [id number]    ASSESSMENT AND PLAN:      1  Gastroesophageal reflux disease without  esophagitis and hemorrhage    --Patient with GERD symptoms  Upper endoscopy in the fall did reveal LA grade a to B esophagitis  He has been on pantoprazole with excellent results  Would continue the same-  Refill  Medication  as needed  -  Has been taking both pantoprazole and famotidine with good results    2  Bloating symptom  -- negative for celiac by biopsy  Patient does report he has to watch his diet a little bit more  -  Consider carbohydrates containing garlic, onions that can cause gaseousness and bloating  3  Abdominal pain, right upper quadrant  -- patient with a 2 month history of right-sided abdominal pain  He did have recent kidney ultrasound which was negative  Does not seem to be related to eating or bowel movements  Seems to be worse with motion and movement  Quite possibly patient has a musculoskeletal issue with the pain as he suspects  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 it  Position sometimes causes the discomfort  Nevertheless it has been persistent  Will image with a CT scan oral and IV contrast-    - Basic metabolic panel; Future  - CT abdomen w contrast; Future      4  Personal history of colonic polyps  -- patient up-to-date with respect to colon cancer screening  Did have an adenoma removed    Recall colonoscopy 2025            Followup Appointment: 1 year

## 2022-01-26 NOTE — PROGRESS NOTES
[facility] - Outpatient Follow-up Note  Raymundo Doyle 45 y o  male MRN: [id number]  Encounter: [id number]    ASSESSMENT AND PLAN:      1  Gastroesophageal reflux disease without  esophagitis and hemorrhage    --Patient with GERD symptoms  Upper endoscopy in the fall did reveal LA grade a to B esophagitis  He has been on pantoprazole with excellent results  Would continue the same-  Refill  Medication  as needed  -  Has been taking both pantoprazole and famotidine with good results    2  Bloating symptom  -- negative for celiac by biopsy  Patient does report he has to watch his diet a little bit more  -  Consider carbohydrates containing garlic, onions that can cause gaseousness and bloating  3  Abdominal pain, right upper quadrant  -- patient with a 2 month history of right-sided abdominal pain  He did have recent kidney ultrasound which was negative  Does not seem to be related to eating or bowel movements  Seems to be worse with motion and movement  Quite possibly patient has a musculoskeletal issue with the pain as he suspects  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 it  Position sometimes causes the discomfort  Nevertheless it has been persistent  Will image with a CT scan oral and IV contrast-    - Basic metabolic panel; Future  - CT abdomen w contrast; Future      4  Personal history of colonic polyps  -- patient up-to-date with respect to colon cancer screening  Did have an adenoma removed  Recall colonoscopy 2025      Followup Appointment: 1 year   ______________________________________________________________________    Chief Complaint   Patient presents with    Follow-up     still having occasional pain issue     HPI:  Patient returns to the office for follow-up visit after his recent endoscopic procedure and evaluation of her GERD and abdominal bloating    Patient reports that since he was seen in the office and had his medications adjusted he is doing quite well  He has been taking pantoprazole 40 mg daily and famotidine at night  He no longer has any significant problems with heartburn or indigestion  Upper endoscopy did reveal visible esophagitis at time of procedure  Patient also had complained about bloating or his last office visit  He was checked for celiac which was negative  He is trying to eat a more healthy diet lose a little weight  Reports the bloating is better as well  Patient does report over the last 2 months he has had relatively frequent abdominal pain on the right side  Seems to be related to position  There is no nausea vomiting associated  It is not related to eating  Not relieved by having a bowel movement  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 and will notice sometimes when he bends over he will have a sharp fleeting pain  Will last for a while and then subsequently go away  His wife has been asking him to get this evaluated because of its persistence  Does not please add a doing a bit would going about his usual activities  Lastly patient did have a colonoscopy 2020  Patient did have adenomatous polyps removed  To be due for colonoscopy again in 2025  Is not have any bowel related issues        Historical Information   Past Medical History:   Diagnosis Date    Acute sinusitis 2011    Anxiety     Cellulitis 2011    Cellulitis of neck 2010    Colon polyp     Facial cellulitis 2010    Hypercholesterolemia     Lymph nodes enlarged     Sensory disturbance     Sleep apnea      Past Surgical History:   Procedure Laterality Date    COLONOSCOPY       Social History     Substance and Sexual Activity   Alcohol Use Yes    Alcohol/week: 2 0 standard drinks    Types: 2 Cans of beer per week    Comment: SOCIAL; 2 BEERS A DAY AS PER ALL SCRIPTS      Social History     Substance and Sexual Activity   Drug Use No     Social History     Tobacco Use   Smoking Status Never Smoker   Smokeless Tobacco Never Used   Tobacco Comment    DENIED: HISTORY OF TOBACCO USE     Family History   Problem Relation Age of Onset    Hypertension Mother     Hyperlipidemia Mother     Coronary artery disease Father     Heart attack Brother         B/D 52 MI    Leukemia Brother     Leukemia Son     Mental illness Neg Hx     Substance Abuse Neg Hx     Alcohol abuse Neg Hx     Colon polyps Neg Hx     Colon cancer Neg Hx          Current Outpatient Medications:     busPIRone (BUSPAR) 15 mg tablet    famotidine (PEPCID) 20 mg tablet    levothyroxine 50 mcg tablet    losartan (COZAAR) 50 mg tablet    methocarbamol (ROBAXIN) 500 mg tablet    pantoprazole (PROTONIX) 40 mg tablet    rosuvastatin (CRESTOR) 10 MG tablet    tadalafil (CIALIS) 5 MG tablet  Allergies   Allergen Reactions    Ace Inhibitors Cough    Penicillins Other (See Comments)     Other reaction(s): unkown, childhood rxn     Reviewed medications and allergies and updated as indicated    PHYSICAL EXAM:    Blood pressure 120/84, height 5' 10 5" (1 791 m), weight 114 kg (251 lb)  Body mass index is 35 51 kg/m²  General Appearance: NAD, cooperative, alert  Eyes: Anicteric, conjunctiva pink  ENT:  Normocephalic, atraumatic, normal mucosa  Neck:  Supple, symmetrical, trachea midline  Resp:  Clear to auscultation bilaterally; no rales, rhonchi or wheezing; respirations unlabored   CV:  S1 S2, Regular rate and rhythm; no murmur, rub, or gallop  GI:  Soft, non-tender, non-distended; normal bowel sounds; no masses, no organomegaly-no masses noted    Rectal: Deferred  Musculoskeletal: No cyanosis, clubbing or edema  Normal ROM    Skin:  No jaundice, rashes, or lesions   Heme/Lymph: No palpable cervical lymphadenopathy  Psych: Normal affect, good eye contact  Neuro: No gross deficits, AAOx3    Lab Results:   Lab Results   Component Value Date    WBC 5 3 09/21/2021    HGB 14 4 09/21/2021    HCT 41 4 09/21/2021 MCV 85 09/21/2021     09/21/2021     Lab Results   Component Value Date     07/27/2017    K 4 5 09/21/2021     09/21/2021    CO2 24 09/21/2021    BUN 14 09/21/2021    CREATININE 1 06 09/21/2021    GLUCOSE 121 (H) 07/27/2017    CALCIUM 9 6 07/27/2017    AST 23 09/21/2021    ALT 33 09/21/2021    ALKPHOS 63 07/27/2017    PROT 6 9 07/27/2017    BILITOT 1 0 07/27/2017

## 2022-01-26 NOTE — LETTER
January 27, 2022     [place] [address]    Patient: Jenni Hoang   YOB: 1970   Date of Visit: 1/26/2022       Dear Dr Jimmie Vaz: Thank you for referring Jenni Hoang to me for evaluation  Below are my notes for this consultation  If you have questions, please do not hesitate to call me  I look forward to following your patient along with you  Sincerely,        Kati Wilson MD        CC: No Recipients  Kati Wilson MD  1/27/2022  3:16 PM  Incomplete  [facility] - Outpatient Follow-up Note  Jenni Hoang 45 y o  male MRN: [id number]  Encounter: [id number]    ASSESSMENT AND PLAN:      1  Gastroesophageal reflux disease without  esophagitis and hemorrhage    --Patient with GERD symptoms  Upper endoscopy in the fall did reveal LA grade a to B esophagitis  He has been on pantoprazole with excellent results  Would continue the same-  Refill  Medication  as needed  -  Has been taking both pantoprazole and famotidine with good results    2  Bloating symptom  -- negative for celiac by biopsy  Patient does report he has to watch his diet a little bit more  -  Consider carbohydrates containing garlic, onions that can cause gaseousness and bloating  3  Abdominal pain, right upper quadrant  -- patient with a 2 month history of right-sided abdominal pain  He did have recent kidney ultrasound which was negative  Does not seem to be related to eating or bowel movements  Seems to be worse with motion and movement  Quite possibly patient has a musculoskeletal issue with the pain as he suspects  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 it  Position sometimes causes the discomfort  Nevertheless it has been persistent  Will image with a CT scan oral and IV contrast-    - Basic metabolic panel; Future  - CT abdomen w contrast; Future      4   Personal history of colonic polyps  -- patient up-to-date with respect to colon cancer screening  Did have an adenoma removed  Recall colonoscopy 2025      Followup Appointment: 1 year   ______________________________________________________________________    Chief Complaint   Patient presents with    Follow-up     still having occasional pain issue     HPI:  Patient returns to the office for follow-up visit after his recent endoscopic procedure and evaluation of her GERD and abdominal bloating  Patient reports that since he was seen in the office and had his medications adjusted he is doing quite well  He has been taking pantoprazole 40 mg daily and famotidine at night  He no longer has any significant problems with heartburn or indigestion  Upper endoscopy did reveal visible esophagitis at time of procedure  Patient also had complained about bloating or his last office visit  He was checked for celiac which was negative  He is trying to eat a more healthy diet lose a little weight  Reports the bloating is better as well  Patient does report over the last 2 months he has had relatively frequent abdominal pain on the right side  Seems to be related to position  There is no nausea vomiting associated  It is not related to eating  Not relieved by having a bowel movement  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 and will notice sometimes when he bends over he will have a sharp fleeting pain  Will last for a while and then subsequently go away  His wife has been asking him to get this evaluated because of its persistence  Does not please add a doing a bit would going about his usual activities  Lastly patient did have a colonoscopy 2020  Patient did have adenomatous polyps removed  To be due for colonoscopy again in 2025  Is not have any bowel related issues        Historical Information   Past Medical History:   Diagnosis Date    Acute sinusitis 2011  Anxiety     Cellulitis 2011    Cellulitis of neck 2010    Colon polyp     Facial cellulitis 2010    Hypercholesterolemia     Lymph nodes enlarged     Sensory disturbance     Sleep apnea      Past Surgical History:   Procedure Laterality Date    COLONOSCOPY       Social History     Substance and Sexual Activity   Alcohol Use Yes    Alcohol/week: 2 0 standard drinks    Types: 2 Cans of beer per week    Comment: SOCIAL; 2 BEERS A DAY AS PER ALL SCRIPTS      Social History     Substance and Sexual Activity   Drug Use No     Social History     Tobacco Use   Smoking Status Never Smoker   Smokeless Tobacco Never Used   Tobacco Comment    DENIED: HISTORY OF TOBACCO USE     Family History   Problem Relation Age of Onset    Hypertension Mother     Hyperlipidemia Mother     Coronary artery disease Father     Heart attack Brother         B/D 52 MI    Leukemia Brother     Leukemia Son     Mental illness Neg Hx     Substance Abuse Neg Hx     Alcohol abuse Neg Hx     Colon polyps Neg Hx     Colon cancer Neg Hx          Current Outpatient Medications:     busPIRone (BUSPAR) 15 mg tablet    famotidine (PEPCID) 20 mg tablet    levothyroxine 50 mcg tablet    losartan (COZAAR) 50 mg tablet    methocarbamol (ROBAXIN) 500 mg tablet    pantoprazole (PROTONIX) 40 mg tablet    rosuvastatin (CRESTOR) 10 MG tablet    tadalafil (CIALIS) 5 MG tablet  Allergies   Allergen Reactions    Ace Inhibitors Cough    Penicillins Other (See Comments)     Other reaction(s): unkown, childhood rxn     Reviewed medications and allergies and updated as indicated    PHYSICAL EXAM:    Blood pressure 120/84, height 5' 10 5" (1 791 m), weight 114 kg (251 lb)  Body mass index is 35 51 kg/m²  General Appearance: NAD, cooperative, alert  Eyes: Anicteric, conjunctiva pink  ENT:  Normocephalic, atraumatic, normal mucosa      Neck:  Supple, symmetrical, trachea midline  Resp:  Clear to auscultation bilaterally; no rales, rhonchi or wheezing; respirations unlabored   CV:  S1 S2, Regular rate and rhythm; no murmur, rub, or gallop  GI:  Soft, non-tender, non-distended; normal bowel sounds; no masses, no organomegaly-no masses noted    Rectal: Deferred  Musculoskeletal: No cyanosis, clubbing or edema  Normal ROM  Skin:  No jaundice, rashes, or lesions   Heme/Lymph: No palpable cervical lymphadenopathy  Psych: Normal affect, good eye contact  Neuro: No gross deficits, AAOx3    Lab Results:   Lab Results   Component Value Date    WBC 5 3 09/21/2021    HGB 14 4 09/21/2021    HCT 41 4 09/21/2021    MCV 85 09/21/2021     09/21/2021     Lab Results   Component Value Date     07/27/2017    K 4 5 09/21/2021     09/21/2021    CO2 24 09/21/2021    BUN 14 09/21/2021    CREATININE 1 06 09/21/2021    GLUCOSE 121 (H) 07/27/2017    CALCIUM 9 6 07/27/2017    AST 23 09/21/2021    ALT 33 09/21/2021    ALKPHOS 63 07/27/2017    PROT 6 9 07/27/2017    BILITOT 1 0 07/27/2017         Jennifer Latif MD  1/26/2022  4:13 PM  Sign when Signing Visit  [place] [address] [facility] - Outpatient Follow-up Note  Donneta Hatchet 45 y o  male MRN: [id number]  Encounter: [id number]    ASSESSMENT AND PLAN:      1  Gastroesophageal reflux disease without  esophagitis and hemorrhage    --Patient with GERD symptoms  Upper endoscopy in the fall did reveal LA grade a to B esophagitis  He has been on pantoprazole with excellent results  Would continue the same-  Refill  Medication  as needed  -  Has been taking both pantoprazole and famotidine with good results    2  Bloating symptom  -- negative for celiac by biopsy  Patient does report he has to watch his diet a little bit more  -  Consider carbohydrates containing garlic, onions that can cause gaseousness and bloating  3  Abdominal pain, right upper quadrant  -- patient with a 2 month history of right-sided abdominal pain  He did have recent kidney ultrasound which was negative    Does not seem to be related to eating or bowel movements  Seems to be worse with motion and movement  Quite possibly patient has a musculoskeletal issue with the pain as he suspects  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 it  Position sometimes causes the discomfort  Nevertheless it has been persistent  Will image with a CT scan oral and IV contrast-    - Basic metabolic panel; Future  - CT abdomen w contrast; Future      4  Personal history of colonic polyps  -- patient up-to-date with respect to colon cancer screening  Did have an adenoma removed    Recall colonoscopy 2025            Followup Appointment: 1 year   ______________________________________________________________________    Chief Complaint   Patient presents with    Follow-up     still having occasional pain issue     HPI:  ***    Historical Information   Past Medical History:   Diagnosis Date    Acute sinusitis 2011    Anxiety     Cellulitis 2011    Cellulitis of neck 2010    Colon polyp     Facial cellulitis 2010    Hypercholesterolemia     Lymph nodes enlarged     Sensory disturbance     Sleep apnea      Past Surgical History:   Procedure Laterality Date    COLONOSCOPY       Social History     Substance and Sexual Activity   Alcohol Use Yes    Alcohol/week: 2 0 standard drinks    Types: 2 Cans of beer per week    Comment: SOCIAL; 2 BEERS A DAY AS PER ALL SCRIPTS      Social History     Substance and Sexual Activity   Drug Use No     Social History     Tobacco Use   Smoking Status Never Smoker   Smokeless Tobacco Never Used   Tobacco Comment    DENIED: HISTORY OF TOBACCO USE     Family History   Problem Relation Age of Onset    Hypertension Mother     Hyperlipidemia Mother     Coronary artery disease Father     Heart attack Brother         B/D 52 MI    Leukemia Brother     Leukemia Son     Mental illness Neg Hx     Substance Abuse Neg Hx     Alcohol abuse Neg Hx     Colon polyps Neg Hx     Colon cancer Neg Hx Current Outpatient Medications:     busPIRone (BUSPAR) 15 mg tablet    famotidine (PEPCID) 20 mg tablet    levothyroxine 50 mcg tablet    losartan (COZAAR) 50 mg tablet    methocarbamol (ROBAXIN) 500 mg tablet    pantoprazole (PROTONIX) 40 mg tablet    rosuvastatin (CRESTOR) 10 MG tablet    tadalafil (CIALIS) 5 MG tablet  Allergies   Allergen Reactions    Ace Inhibitors Cough    Penicillins Other (See Comments)     Other reaction(s): unkown, childhood rxn     Reviewed medications and allergies and updated as indicated    PHYSICAL EXAM:    Blood pressure 120/84, height 5' 10 5" (1 791 m), weight 114 kg (251 lb)  Body mass index is 35 51 kg/m²  General Appearance: NAD, cooperative, alert  Eyes: Anicteric, PERRLA, EOMI  ENT:  Normocephalic, atraumatic, normal mucosa  Neck:  Supple, symmetrical, trachea midline  Resp:  Clear to auscultation bilaterally; no rales, rhonchi or wheezing; respirations unlabored   CV:  S1 S2, Regular rate and rhythm; no murmur, rub, or gallop  GI:  Soft, non-tender, non-distended; normal bowel sounds; no masses, no organomegaly   Rectal: Deferred  Musculoskeletal: No cyanosis, clubbing or edema  Normal ROM    Skin:  No jaundice, rashes, or lesions   Heme/Lymph: No palpable cervical lymphadenopathy  Psych: Normal affect, good eye contact  Neuro: No gross deficits, AAOx3    Lab Results:   Lab Results   Component Value Date    WBC 5 3 09/21/2021    HGB 14 4 09/21/2021    HCT 41 4 09/21/2021    MCV 85 09/21/2021     09/21/2021     Lab Results   Component Value Date     07/27/2017    K 4 5 09/21/2021     09/21/2021    CO2 24 09/21/2021    BUN 14 09/21/2021    CREATININE 1 06 09/21/2021    GLUCOSE 121 (H) 07/27/2017    CALCIUM 9 6 07/27/2017    AST 23 09/21/2021    ALT 33 09/21/2021    ALKPHOS 63 07/27/2017    PROT 6 9 07/27/2017    BILITOT 1 0 07/27/2017     No results found for: IRON, TIBC, FERRITIN  No results found for: LIPASE    Radiology Results:   No results found

## 2022-01-30 DIAGNOSIS — E03.9 HYPOTHYROIDISM, UNSPECIFIED TYPE: ICD-10-CM

## 2022-01-30 RX ORDER — LEVOTHYROXINE SODIUM 0.05 MG/1
TABLET ORAL
Qty: 30 TABLET | Refills: 2 | Status: SHIPPED | OUTPATIENT
Start: 2022-01-30 | End: 2022-04-26

## 2022-02-01 DIAGNOSIS — E78.2 MIXED HYPERLIPIDEMIA: ICD-10-CM

## 2022-02-01 RX ORDER — ROSUVASTATIN CALCIUM 10 MG/1
TABLET, COATED ORAL
Qty: 30 TABLET | Refills: 1 | Status: SHIPPED | OUTPATIENT
Start: 2022-02-01 | End: 2022-03-28

## 2022-02-16 ENCOUNTER — TELEPHONE (OUTPATIENT)
Dept: GASTROENTEROLOGY | Facility: CLINIC | Age: 52
End: 2022-02-16

## 2022-02-16 NOTE — TELEPHONE ENCOUNTER
Pt calling w/ ques re: CT scan order and Ins questions; was advised approved and info in computer SL  would have seen pre cert info  Also,  told him because of BP meds he needs lab work done   Conf'd order for labs is already in electronically to [facility]

## 2022-02-24 LAB
BUN SERPL-MCNC: 12 MG/DL (ref 6–24)
BUN/CREAT SERPL: 11 (ref 9–20)
CALCIUM SERPL-MCNC: 9.4 MG/DL (ref 8.7–10.2)
CHLORIDE SERPL-SCNC: 103 MMOL/L (ref 96–106)
CO2 SERPL-SCNC: 24 MMOL/L (ref 20–29)
CREAT SERPL-MCNC: 1.11 MG/DL (ref 0.76–1.27)
GLUCOSE SERPL-MCNC: 104 MG/DL (ref 65–99)
POTASSIUM SERPL-SCNC: 4.4 MMOL/L (ref 3.5–5.2)
SL AMB EGFR AFRICAN AMERICAN: 88 ML/MIN/1.73
SL AMB EGFR NON AFRICAN AMERICAN: 76 ML/MIN/1.73
SODIUM SERPL-SCNC: 140 MMOL/L (ref 134–144)

## 2022-03-01 DIAGNOSIS — F43.22 ADJUSTMENT DISORDER WITH ANXIETY: ICD-10-CM

## 2022-03-01 RX ORDER — BUSPIRONE HYDROCHLORIDE 15 MG/1
TABLET ORAL
Qty: 60 TABLET | Refills: 2 | Status: SHIPPED | OUTPATIENT
Start: 2022-03-01 | End: 2022-05-25

## 2022-03-02 ENCOUNTER — HOSPITAL ENCOUNTER (OUTPATIENT)
Dept: CT IMAGING | Facility: HOSPITAL | Age: 52
Discharge: HOME | End: 2022-03-02
Attending: INTERNAL MEDICINE
Payer: COMMERCIAL

## 2022-03-02 DIAGNOSIS — R10.11 ABDOMINAL PAIN, RIGHT UPPER QUADRANT: ICD-10-CM

## 2022-03-02 PROCEDURE — G1004 CDSM NDSC: HCPCS

## 2022-03-02 PROCEDURE — 74160 CT ABDOMEN W/CONTRAST: CPT

## 2022-03-02 RX ADMIN — IOHEXOL 100 ML: 350 INJECTION, SOLUTION INTRAVENOUS at 13:30

## 2022-03-10 NOTE — RESULT ENCOUNTER NOTE
I called the patient -   left voicemail - CT just shows fatty liver and diverticulosis --  - no major findings

## 2022-03-22 ENCOUNTER — RA CDI HCC (OUTPATIENT)
Dept: OTHER | Facility: HOSPITAL | Age: 52
End: 2022-03-22

## 2022-03-22 NOTE — PROGRESS NOTES
[facility] 75  coding opportunities       Chart reviewed, no opportunity found: CHART REVIEWED, NO OPPORTUNITY FOUND        Patients Insurance     Medicare Insurance: [employer] [facility] Advantage

## 2022-03-28 DIAGNOSIS — E78.2 MIXED HYPERLIPIDEMIA: ICD-10-CM

## 2022-03-28 DIAGNOSIS — R10.13 DYSPEPSIA: ICD-10-CM

## 2022-03-28 RX ORDER — ROSUVASTATIN CALCIUM 10 MG/1
TABLET, COATED ORAL
Qty: 30 TABLET | Refills: 1 | Status: SHIPPED | OUTPATIENT
Start: 2022-03-28 | End: 2022-05-23

## 2022-03-28 RX ORDER — FAMOTIDINE 20 MG/1
TABLET, FILM COATED ORAL
Qty: 30 TABLET | Refills: 1 | Status: SHIPPED | OUTPATIENT
Start: 2022-03-28

## 2022-04-21 ENCOUNTER — TELEPHONE (OUTPATIENT)
Dept: FAMILY MEDICINE CLINIC | Facility: CLINIC | Age: 52
End: 2022-04-21

## 2022-04-21 DIAGNOSIS — R73.01 ELEVATED FASTING GLUCOSE: ICD-10-CM

## 2022-04-21 DIAGNOSIS — E78.2 MIXED HYPERLIPIDEMIA: Primary | ICD-10-CM

## 2022-04-21 NOTE — TELEPHONE ENCOUNTER
Patient has an appt scheduled with you for next Friday  It's a recheck and a medication check  Do you want him to have any blood work done prior? He uses [facility]     Please enter any appropriate orders in his chart

## 2022-04-26 DIAGNOSIS — E03.9 HYPOTHYROIDISM, UNSPECIFIED TYPE: ICD-10-CM

## 2022-04-26 LAB
CHOLEST SERPL-MCNC: 147 MG/DL (ref 100–199)
EST. AVERAGE GLUCOSE BLD GHB EST-MCNC: 126 MG/DL
HBA1C MFR BLD: 6 % (ref 4.8–5.6)
HDLC SERPL-MCNC: 43 MG/DL
LDLC SERPL CALC-MCNC: 62 MG/DL (ref 0–99)
LDLC/HDLC SERPL: 1.4 RATIO (ref 0–3.6)
SL AMB VLDL CHOLESTEROL CALC: 42 MG/DL (ref 5–40)
TRIGL SERPL-MCNC: 262 MG/DL (ref 0–149)

## 2022-04-26 RX ORDER — LEVOTHYROXINE SODIUM 0.05 MG/1
TABLET ORAL
Qty: 30 TABLET | Refills: 2 | Status: SHIPPED | OUTPATIENT
Start: 2022-04-26 | End: 2022-07-23

## 2022-04-29 ENCOUNTER — OFFICE VISIT (OUTPATIENT)
Dept: FAMILY MEDICINE CLINIC | Facility: CLINIC | Age: 52
End: 2022-04-29
Payer: COMMERCIAL

## 2022-04-29 VITALS
BODY MASS INDEX: 35.34 KG/M2 | RESPIRATION RATE: 16 BRPM | SYSTOLIC BLOOD PRESSURE: 124 MMHG | HEART RATE: 90 BPM | HEIGHT: 71 IN | DIASTOLIC BLOOD PRESSURE: 84 MMHG | WEIGHT: 252.4 LBS

## 2022-04-29 DIAGNOSIS — F43.22 ADJUSTMENT DISORDER WITH ANXIETY: ICD-10-CM

## 2022-04-29 DIAGNOSIS — E03.8 OTHER SPECIFIED HYPOTHYROIDISM: ICD-10-CM

## 2022-04-29 DIAGNOSIS — I10 ESSENTIAL HYPERTENSION: ICD-10-CM

## 2022-04-29 DIAGNOSIS — K21.01 GASTROESOPHAGEAL REFLUX DISEASE WITH ESOPHAGITIS AND HEMORRHAGE: ICD-10-CM

## 2022-04-29 DIAGNOSIS — G47.30 SLEEP APNEA, UNSPECIFIED TYPE: ICD-10-CM

## 2022-04-29 DIAGNOSIS — R73.03 PREDIABETES: ICD-10-CM

## 2022-04-29 DIAGNOSIS — Z00.00 ANNUAL PHYSICAL EXAM: Primary | ICD-10-CM

## 2022-04-29 DIAGNOSIS — E78.2 MIXED HYPERLIPIDEMIA: ICD-10-CM

## 2022-04-29 PROBLEM — R10.11 ABDOMINAL PAIN, RIGHT UPPER QUADRANT: Status: RESOLVED | Noted: 2022-01-26 | Resolved: 2022-04-29

## 2022-04-29 PROBLEM — N52.9 ERECTILE DYSFUNCTION: Status: RESOLVED | Noted: 2020-09-29 | Resolved: 2022-04-29

## 2022-04-29 PROCEDURE — 93000 ELECTROCARDIOGRAM COMPLETE: CPT | Performed by: NURSE PRACTITIONER

## 2022-04-29 PROCEDURE — 99214 OFFICE O/P EST MOD 30 MIN: CPT | Performed by: NURSE PRACTITIONER

## 2022-04-29 PROCEDURE — 99396 PREV VISIT EST AGE 40-64: CPT | Performed by: NURSE PRACTITIONER

## 2022-04-29 NOTE — PROGRESS NOTES
Assessment/Plan:     Diagnoses and all orders for this visit:    Essential hypertension  -     POCT ECG    BP stable at today's visit w/ Losartan  EKG NSR today  RTO in 6 months for rechecks with labs prior  BMI 35 0-35 9,adult    We discussed appropriate lifestyle recommendations & goal BMI range  Pt was educated on appropriate diet and exercise modifications  Adjustment disorder with anxiety    Stable with Buspar  Continue same  Mixed hyperlipidemia    Recent lipid panel showing total cholesterol 147, triglycerides 262, HDL 43 & LDL 62  Patient is currently on Rosuvastatin  We did discuss patient being more diligent with diet and exercise so that we can avoid increasing medication  He will work on this and we will recheck level in 6 months  Today's ASCVD risk at 3 6%  Prediabetes    Most recent A1c at 6 0%  We did discuss patient being more diligent with diet and exercise so that we can avoid increasing medication  He will work on this and we will recheck level in 6 months  Today's ASCVD risk at 3 6%  Gastroesophageal reflux disease with esophagitis and hemorrhage    Stable with daily Protonix  Continue same  Other specified hypothyroidism    TSH from 09/2021 WNL  Continue Levothyroxine 50 mcg daily  Repeat TSH in 6 months prior to next OV  Sleep apnea, unspecified type      Patient non-compliant with CPAP due to discomfort  He does not desire to utilize this at this time  Pt to RTO in 6 months for recheck w/ fasting labs prior  Subjective:      Patient ID: Katja Rivers is a 45 y o  male  Patient presents today for his annual physical   He did just recently have labs drawn and his triglycerides and A1c have both taken a jump  Patient notes he has not been exercising and eating well like he should  He desire to work on this to help improve his numbers and overall health        The following portions of the patient's history were reviewed and updated as appropriate: allergies, current medications, past family history, past medical history, past social history, past surgical history and problem list     Review of Systems      Objective:      /84 (BP Location: Left arm, Patient Position: Sitting, Cuff Size: Large)   Pulse 90   Resp 16   Ht 5' 11" (1 803 m)   Wt 114 kg (252 lb 6 4 oz)   BMI 35 20 kg/m²          Physical Exam  Vitals reviewed  Constitutional:       General: He is not in acute distress  Appearance: Normal appearance  He is not ill-appearing  HENT:      Right Ear: Tympanic membrane, ear canal and external ear normal       Left Ear: Tympanic membrane, ear canal and external ear normal    Neck:      Vascular: No carotid bruit  Cardiovascular:      Rate and Rhythm: Normal rate and regular rhythm  Pulses: Normal pulses  Heart sounds: Normal heart sounds  No murmur heard  Pulmonary:      Effort: Pulmonary effort is normal  No respiratory distress  Breath sounds: Normal breath sounds  No wheezing  Abdominal:      General: There is no distension  Palpations: Abdomen is soft  There is no mass  Tenderness: There is no abdominal tenderness  There is no guarding or rebound  Hernia: No hernia is present  Musculoskeletal:         General: Normal range of motion  Cervical back: Normal range of motion  Right lower leg: No edema  Left lower leg: No edema  Skin:     General: Skin is warm  Capillary Refill: Capillary refill takes less than 2 seconds  Neurological:      General: No focal deficit present  Mental Status: He is alert and oriented to person, place, and time  Motor: No weakness  Gait: Gait normal    Psychiatric:         Mood and Affect: Mood normal          Behavior: Behavior normal          Thought Content:  Thought content normal          Judgment: Judgment normal

## 2022-04-29 NOTE — PROGRESS NOTES
ADULT ANNUAL PHYSICAL  [facility]    NAME: Padma Byrd  AGE: 45 y o  SEX: male  : 1970     DATE: 2022     Assessment and Plan:  1  Labs UTD  2  Colonoscopy UTD  3  EKG today  4  F/u in 6 months for recheck with labs prior or sooner PRN  Problem List Items Addressed This Visit        Digestive    Gastroesophageal reflux disease with esophagitis and hemorrhage       Endocrine    Other specified hypothyroidism       Respiratory    Sleep apnea       Cardiovascular and Mediastinum    Essential hypertension    Relevant Orders    POCT ECG (Completed)       Other    Adjustment disorder with anxiety    Hyperlipidemia    Prediabetes    BMI 35 0-35 9,adult      Other Visit Diagnoses     Annual physical exam    -  Primary          Immunizations and preventive care screenings were discussed with patient today  Appropriate education was printed on patient's after visit summary  Counseling:  Alcohol/drug use: discussed moderation in alcohol intake, the recommendations for healthy alcohol use, and avoidance of illicit drug use  Dental Health: discussed importance of regular tooth brushing, flossing, and dental visits  Injury prevention: discussed safety/seat belts, safety helmets, smoke detectors, carbon dioxide detectors, and smoking near bedding or upholstery  Sexual health: discussed sexually transmitted diseases, partner selection, use of condoms, avoidance of unintended pregnancy, and contraceptive alternatives  · Exercise: the importance of regular exercise/physical activity was discussed  Recommend exercise 3-5 times per week for at least 30 minutes  BMI Counseling: Body mass index is 35 2 kg/m²   The BMI is above normal  Nutrition recommendations include decreasing portion sizes, encouraging healthy choices of fruits and vegetables, decreasing fast food intake, consuming healthier snacks, limiting drinks that contain sugar, moderation in carbohydrate intake, increasing intake of lean protein, reducing intake of saturated and trans fat and reducing intake of cholesterol  Exercise recommendations include moderate physical activity 150 minutes/week  No pharmacotherapy was ordered  Rationale for BMI follow-up plan is due to patient being overweight or obese  Depression Screening and Follow-up Plan: Patient was screened for depression during today's encounter  They screened negative with a PHQ-2 score of 0  Return in about 6 months (around 10/29/2022) for Recheck  Chief Complaint:     Chief Complaint   Patient presents with    GERD    Hyperlipidemia    Hypertension    Vitamin D Deficiency      History of Present Illness:     Adult Annual Physical   Patient here for a comprehensive physical exam  The patient reports no problems  Diet and Physical Activity  · Diet/Nutrition: well balanced diet, consuming 3-5 servings of fruits/vegetables daily and adequate fiber intake  · Exercise: no formal exercise  Depression Screening  PHQ-2/9 Depression Screening    Little interest or pleasure in doing things: 0 - not at all  Feeling down, depressed, or hopeless: 0 - not at all  PHQ-2 Score: 0  PHQ-2 Interpretation: Negative depression screen       General Health  · Sleep: sleeps poorly and gets 4-6 hours of sleep on average  · Hearing: normal - bilateral   · Vision: no vision problems, most recent eye exam <1 year ago and wears glasses  · Dental: regular dental visits, brushes teeth twice daily and flosses teeth occasionally   Health  · Symptoms include: none     Review of Systems:     Review of Systems   Constitutional: Negative  HENT: Negative  Respiratory: Negative  Negative for cough  Cardiovascular: Negative  Gastrointestinal: Negative  Genitourinary: Negative  Musculoskeletal: Negative  Negative for myalgias  Neurological: Negative      Psychiatric/Behavioral: Negative  Past Medical History:     Past Medical History:   Diagnosis Date    Acute sinusitis 2011    Anxiety     Cellulitis 2011    Cellulitis of neck 2010    Colon polyp     Facial cellulitis 2010    Hypercholesterolemia     Lymph nodes enlarged     Sensory disturbance     Sleep apnea       Past Surgical History:     Past Surgical History:   Procedure Laterality Date    COLONOSCOPY        Family History:     Family History   Problem Relation Age of Onset    Hypertension Mother     Hyperlipidemia Mother     Coronary artery disease Father     Heart attack Brother         B/D 52 MI    Leukemia Brother     Leukemia Son     Mental illness Neg Hx     Substance Abuse Neg Hx     Alcohol abuse Neg Hx     Colon polyps Neg Hx     Colon cancer Neg Hx       Social History:     Social History     Socioeconomic History    Marital status: /Civil Union     Spouse name: None    Number of children: None    Years of education: None    Highest education level: None   Occupational History    None   Tobacco Use    Smoking status: Never Smoker    Smokeless tobacco: Never Used    Tobacco comment: DENIED: HISTORY OF TOBACCO USE   Vaping Use    Vaping Use: Never used   Substance and Sexual Activity    Alcohol use:  Yes     Alcohol/week: 2 0 standard drinks     Types: 2 Cans of beer per week     Comment: SOCIAL; 2 BEERS A DAY AS PER ALL SCRIPTS     Drug use: No    Sexual activity: None   Other Topics Concern    None   Social History Narrative    Always uses seat belt    Caffeine use: 4 cups of coffee qd    Has smoke detectors     Social Determinants of Health     Financial Resource Strain: Not on file   Food Insecurity: Not on file   Transportation Needs: Not on file   Physical Activity: Not on file   Stress: Not on file   Social Connections: Not on file   Intimate Partner Violence: Not on file   Housing Stability: Not on file      Current Medications:     Current Outpatient Medications Medication Sig Dispense Refill    busPIRone (BUSPAR) 15 mg tablet TAKE 1 TABLET BY MOUTH TWICE A DAY 60 tablet 2    famotidine (PEPCID) 20 mg tablet TAKE 1 TABLET BY MOUTH EVERY DAY 30 tablet 1    levothyroxine 50 mcg tablet TAKE 1 TABLET BY MOUTH EVERY DAY 30 tablet 2    losartan (COZAAR) 50 mg tablet Take 1 tablet (50 mg total) by mouth daily 30 tablet 0    methocarbamol (ROBAXIN) 500 mg tablet Take 1 tablet (500 mg total) by mouth 2 (two) times a day as needed for muscle spasms 30 tablet 1    rosuvastatin (CRESTOR) 10 MG tablet TAKE 1 TABLET BY MOUTH EVERY DAY 30 tablet 1    tadalafil (CIALIS) 5 MG tablet Take 1 tablet (5 mg total) by mouth daily 30 tablet 11    pantoprazole (PROTONIX) 40 mg tablet Take 1 tablet (40 mg total) by mouth daily 30 tablet 12     No current facility-administered medications for this visit  Allergies: Allergies   Allergen Reactions    Ace Inhibitors Cough    Penicillins Other (See Comments)     Other reaction(s): unkown, childhood rxn      Physical Exam:     /84 (BP Location: Left arm, Patient Position: Sitting, Cuff Size: Large)   Pulse 90   Resp 16   Ht 5' 11" (1 803 m)   Wt 114 kg (252 lb 6 4 oz)   BMI 35 20 kg/m²     Physical Exam  Vitals and nursing note reviewed  Constitutional:       General: He is not in acute distress  Appearance: Normal appearance  He is well-developed  He is not ill-appearing  HENT:      Head: Normocephalic and atraumatic  Right Ear: Tympanic membrane, ear canal and external ear normal       Left Ear: Tympanic membrane, ear canal and external ear normal    Eyes:      Conjunctiva/sclera: Conjunctivae normal    Neck:      Vascular: No carotid bruit  Cardiovascular:      Rate and Rhythm: Normal rate and regular rhythm  Heart sounds: No murmur heard  Pulmonary:      Effort: Pulmonary effort is normal  No respiratory distress  Breath sounds: Normal breath sounds  No wheezing     Abdominal:      General: There is no distension  Palpations: Abdomen is soft  There is no mass  Tenderness: There is no abdominal tenderness  There is no guarding or rebound  Hernia: No hernia is present  Musculoskeletal:         General: Normal range of motion  Cervical back: Neck supple  Right lower leg: No edema  Left lower leg: No edema  Skin:     General: Skin is warm and dry  Neurological:      Mental Status: He is alert and oriented to person, place, and time  Mental status is at baseline  Psychiatric:         Mood and Affect: Mood normal          Behavior: Behavior normal          Thought Content:  Thought content normal          Judgment: Judgment normal           JUNIOR Mcdonald  [address]

## 2022-04-29 NOTE — PATIENT INSTRUCTIONS

## 2022-05-21 DIAGNOSIS — I10 BENIGN ESSENTIAL HTN: ICD-10-CM

## 2022-05-21 RX ORDER — LOSARTAN POTASSIUM 50 MG/1
TABLET ORAL
Qty: 30 TABLET | Refills: 0 | Status: SHIPPED | OUTPATIENT
Start: 2022-05-21 | End: 2022-06-21

## 2022-05-23 DIAGNOSIS — E78.2 MIXED HYPERLIPIDEMIA: ICD-10-CM

## 2022-05-23 RX ORDER — ROSUVASTATIN CALCIUM 10 MG/1
TABLET, COATED ORAL
Qty: 30 TABLET | Refills: 11 | Status: SHIPPED | OUTPATIENT
Start: 2022-05-23

## 2022-05-25 DIAGNOSIS — F43.22 ADJUSTMENT DISORDER WITH ANXIETY: ICD-10-CM

## 2022-05-25 RX ORDER — BUSPIRONE HYDROCHLORIDE 15 MG/1
TABLET ORAL
Qty: 60 TABLET | Refills: 2 | Status: SHIPPED | OUTPATIENT
Start: 2022-05-25

## 2022-06-21 DIAGNOSIS — I10 BENIGN ESSENTIAL HTN: ICD-10-CM

## 2022-06-21 RX ORDER — LOSARTAN POTASSIUM 50 MG/1
TABLET ORAL
Qty: 30 TABLET | Refills: 0 | Status: SHIPPED | OUTPATIENT
Start: 2022-06-21 | End: 2022-07-19

## 2022-07-19 DIAGNOSIS — I10 BENIGN ESSENTIAL HTN: ICD-10-CM

## 2022-07-19 RX ORDER — LOSARTAN POTASSIUM 50 MG/1
TABLET ORAL
Qty: 30 TABLET | Refills: 0 | Status: SHIPPED | OUTPATIENT
Start: 2022-07-19 | End: 2022-08-20

## 2022-07-23 DIAGNOSIS — E03.9 HYPOTHYROIDISM, UNSPECIFIED TYPE: ICD-10-CM

## 2022-07-23 RX ORDER — LEVOTHYROXINE SODIUM 0.05 MG/1
TABLET ORAL
Qty: 30 TABLET | Refills: 2 | Status: SHIPPED | OUTPATIENT
Start: 2022-07-23 | End: 2022-10-20

## 2022-08-12 ENCOUNTER — TELEMEDICINE (OUTPATIENT)
Dept: FAMILY MEDICINE CLINIC | Facility: CLINIC | Age: 52
End: 2022-08-12
Payer: COMMERCIAL

## 2022-08-12 VITALS — WEIGHT: 245 LBS | BODY MASS INDEX: 34.17 KG/M2 | TEMPERATURE: 98.7 F

## 2022-08-12 DIAGNOSIS — U07.1 COVID-19: Primary | ICD-10-CM

## 2022-08-12 PROCEDURE — 99214 OFFICE O/P EST MOD 30 MIN: CPT | Performed by: NURSE PRACTITIONER

## 2022-08-12 RX ORDER — FLUTICASONE PROPIONATE 110 UG/1
4 AEROSOL, METERED RESPIRATORY (INHALATION) 2 TIMES DAILY
Qty: 12 G | Refills: 0 | Status: SHIPPED | OUTPATIENT
Start: 2022-08-12 | End: 2022-09-27

## 2022-08-12 RX ORDER — HYDROCODONE POLISTIREX AND CHLORPHENIRAMINE POLISTIREX 10; 8 MG/5ML; MG/5ML
5 SUSPENSION, EXTENDED RELEASE ORAL EVERY 12 HOURS PRN
Qty: 70 ML | Refills: 0 | OUTPATIENT
Start: 2022-08-12 | End: 2022-09-26

## 2022-08-12 RX ORDER — BROMPHENIRAMINE MALEATE, PSEUDOEPHEDRINE HYDROCHLORIDE, AND DEXTROMETHORPHAN HYDROBROMIDE 2; 30; 10 MG/5ML; MG/5ML; MG/5ML
SYRUP ORAL
COMMUNITY
Start: 2022-07-08 | End: 2022-10-27 | Stop reason: ALTCHOICE

## 2022-08-12 NOTE — PROGRESS NOTES
COVID-19 Outpatient Progress Note    Assessment/Plan:    Pt presents today for symptoms that started on 08/10  He notes that on 08/11 he had positive home test   Pt has been taking Tylenol & Dayquil/Nyquil  We did discuss Paxlovid however pt would like to defer at this time  Patient is interested in inhaler and cough syrup  Medications prescribed and s/e reviewed  Quarantine guidelines reviewed  Patient is encouraged to call our office for any questions/concerns, persistent or worsening symptoms  Patient states they understand and agree with treatment plan  Problem List Items Addressed This Visit    None     Visit Diagnoses     COVID-19    -  Primary    Relevant Medications    fluticasone (Flovent HFA) 110 MCG/ACT inhaler    hydrocodone-chlorpheniramine polistirex (TUSSIONEX) 10-8 mg/5 mL ER suspension         Disposition:     Patient has COVID-19 infection  Based off CDC guidelines, they were recommended to isolate for 5 days from the date of the positive test  If they remain asymptomatic, isolation may be ended followed by 5 days of wearing a mask when around othes to minimize risk of infecting others  If they have a fever, continue to stay home until fever resolves for at least 24 hours  I have spent 15 minutes directly with the patient  Greater than 50% of this time was spent in counseling/coordination of care regarding: patient and family education and importance of treatment compliance  Encounter provider CHICHI Alaniz    Provider located at [address] [place] [address]  [phone]    Recent Visits  No visits were found meeting these conditions    Showing recent visits within past 7 days and meeting all other requirements  Today's Visits  Date Type Provider Dept   08/12/22 Telemedicine CHICHI Alaniz [address]   Showing today's visits and meeting all other requirements  Future Appointments  No visits were found meeting these conditions  Showing future appointments within next 150 days and meeting all other requirements       The patient was identified by name and date of birth  Ruma Lara was informed that this is a telemedicine visit and that the visit is being conducted through [address] [place] [address] Now and patient was informed that this is a secure, HIPAA-compliant platform  He agrees to proceed     My office door was closed  No one else was in the room  He acknowledged consent and understanding of privacy and security of the video platform  The patient has agreed to participate and understands they can discontinue the visit at any time  Patient is aware this is a billable service  This virtual check-in was done via [employer] and patient was informed that this is a secure, HIPAA-compliant platform  He agrees to proceed  Patient agrees to participate in a virtual check in via telephone or video visit instead of presenting to the office to address urgent/immediate medical needs  Patient is aware this is a billable service  After connecting through [facility], the patient was identified by name and date of birth  Ruma Lara was informed that this was a telemedicine visit and that the exam was being conducted confidentially over secure lines  My office door was closed  No one else was in the room  Ruma Lara acknowledged consent and understanding of privacy and security of the telemedicine visit  I informed the patient that I have reviewed his record in Epic and presented the opportunity for him to ask any questions regarding the visit today  The patient agreed to participate  Verification of patient location:  Patient is located in the following state in which I hold an active license: [place]    Subjective:   Ruma Lara is a 45 y o  male who has been screened for COVID-19  Symptom change since last report: unchanged  Patient's symptoms include fever, nasal congestion, cough, shortness of breath (during coughing fit), myalgias and headache      - Date of symptom onset: 8/10/2022  - Date of positive COVID-19 test: 8/11/2022  Type of test: Home antigen  Home antigen result verified by provider  Will get picture of test uploaded/scanned into patient's chart  COVID-19 vaccination status: Fully vaccinated with booster    Gabi Davis has been staying home and has isolated themselves in his home  He is taking care to not share personal items and is cleaning all surfaces that are touched often, like counters, tabletops, and doorknobs using household cleaning sprays or wipes  He is wearing a mask when he leaves his room  Pt presents today for symptoms that started on 08/10  He notes that on 08/11 he had positive home test   Pt has been taking Tylenol & Dayquil/Nyquil  We did discuss Paxlovid however pt would like to defer  Patient is interested in inhaler and cough syrup  Medications prescribed and s/e reviewed  Quarantine guidelines reviewed  Lab Results   Component Value Date    SARSCOV2 NOT DETECTED 11/13/2021     Past Medical History:   Diagnosis Date    Acute sinusitis 2011    Anxiety     Cellulitis 2011    Cellulitis of neck 2010    Colon polyp     Facial cellulitis 2010    Hypercholesterolemia     Lymph nodes enlarged     Sensory disturbance     Sleep apnea      Past Surgical History:   Procedure Laterality Date    COLONOSCOPY       Current Outpatient Medications   Medication Sig Dispense Refill    brompheniramine-pseudoephedrine-DM 30-2-10 MG/5ML syrup take 5ml by mouth every 4 to 6 hours      busPIRone (BUSPAR) 15 mg tablet TAKE 1 TABLET BY MOUTH TWICE A DAY 60 tablet 2    famotidine (PEPCID) 20 mg tablet TAKE 1 TABLET BY MOUTH EVERY DAY 30 tablet 1    fluticasone (Flovent HFA) 110 MCG/ACT inhaler Inhale 4 puffs 2 (two) times a day Rinse mouth after use   12 g 0    hydrocodone-chlorpheniramine polistirex (TUSSIONEX) 10-8 mg/5 mL ER suspension Take 5 mL by mouth every 12 (twelve) hours as needed for cough Max Daily Amount: 10 mL 70 mL 0    levothyroxine 50 mcg tablet TAKE 1 TABLET BY MOUTH EVERY DAY 30 tablet 2    losartan (COZAAR) 50 mg tablet TAKE ONE TABLET BY MOUTH EVERY DAY 30 tablet 0    methocarbamol (ROBAXIN) 500 mg tablet Take 1 tablet (500 mg total) by mouth 2 (two) times a day as needed for muscle spasms 30 tablet 1    rosuvastatin (CRESTOR) 10 MG tablet TAKE 1 TABLET BY MOUTH EVERY DAY 30 tablet 11    tadalafil (CIALIS) 5 MG tablet Take 1 tablet (5 mg total) by mouth daily 30 tablet 11    pantoprazole (PROTONIX) 40 mg tablet Take 1 tablet (40 mg total) by mouth daily 30 tablet 12     No current facility-administered medications for this visit  Allergies   Allergen Reactions    Ace Inhibitors Cough    Penicillins Other (See Comments)     Other reaction(s): unkown, childhood rxn       Review of Systems   Constitutional: Positive for fever  HENT: Positive for congestion  Respiratory: Positive for cough and shortness of breath (during coughing fit)  Musculoskeletal: Positive for myalgias  Neurological: Positive for headaches  Objective:    Vitals:    08/12/22 1004   Temp: 98 7 °F (37 1 °C)   TempSrc: Oral   Weight: 111 kg (245 lb)       Physical Exam  Constitutional:       General: He is not in acute distress  Appearance: Normal appearance  He is not ill-appearing  Pulmonary:      Effort: Pulmonary effort is normal    Neurological:      Mental Status: He is alert and oriented to person, place, and time  Mental status is at baseline  Psychiatric:         Mood and Affect: Mood normal          Behavior: Behavior normal          Thought Content:  Thought content normal          Judgment: Judgment normal

## 2022-08-20 DIAGNOSIS — I10 BENIGN ESSENTIAL HTN: ICD-10-CM

## 2022-08-20 RX ORDER — LOSARTAN POTASSIUM 50 MG/1
TABLET ORAL
Qty: 30 TABLET | Refills: 0 | Status: SHIPPED | OUTPATIENT
Start: 2022-08-20 | End: 2022-09-16

## 2022-08-21 DIAGNOSIS — F43.22 ADJUSTMENT DISORDER WITH ANXIETY: ICD-10-CM

## 2022-08-22 RX ORDER — BUSPIRONE HYDROCHLORIDE 15 MG/1
TABLET ORAL
Qty: 60 TABLET | Refills: 2 | Status: SHIPPED | OUTPATIENT
Start: 2022-08-22

## 2022-09-16 DIAGNOSIS — I10 BENIGN ESSENTIAL HTN: ICD-10-CM

## 2022-09-16 RX ORDER — LOSARTAN POTASSIUM 50 MG/1
TABLET ORAL
Qty: 30 TABLET | Refills: 0 | Status: SHIPPED | OUTPATIENT
Start: 2022-09-16 | End: 2022-10-18

## 2022-09-26 ENCOUNTER — HOSPITAL ENCOUNTER (EMERGENCY)
Facility: HOSPITAL | Age: 52
Discharge: HOME | End: 2022-09-26
Attending: EMERGENCY MEDICINE
Payer: COMMERCIAL

## 2022-09-26 VITALS
OXYGEN SATURATION: 99 % | WEIGHT: 240 LBS | HEART RATE: 97 BPM | TEMPERATURE: 97.9 F | BODY MASS INDEX: 33.6 KG/M2 | RESPIRATION RATE: 20 BRPM | HEIGHT: 71 IN | SYSTOLIC BLOOD PRESSURE: 162 MMHG | DIASTOLIC BLOOD PRESSURE: 84 MMHG

## 2022-09-26 DIAGNOSIS — M62.830 BACK MUSCLE SPASM: ICD-10-CM

## 2022-09-26 DIAGNOSIS — M54.16 LEFT LUMBAR RADICULOPATHY: Primary | ICD-10-CM

## 2022-09-26 DIAGNOSIS — S39.012A STRAIN OF LUMBAR REGION, INITIAL ENCOUNTER: ICD-10-CM

## 2022-09-26 PROCEDURE — 99283 EMERGENCY DEPT VISIT LOW MDM: CPT

## 2022-09-26 PROCEDURE — 96372 THER/PROPH/DIAG INJ SC/IM: CPT

## 2022-09-26 PROCEDURE — 99284 EMERGENCY DEPT VISIT MOD MDM: CPT | Performed by: EMERGENCY MEDICINE

## 2022-09-26 RX ORDER — METHYLPREDNISOLONE 4 MG/1
4 TABLET ORAL SEE ADMIN INSTRUCTIONS
Qty: 21 TABLET | Refills: 0 | Status: SHIPPED | OUTPATIENT
Start: 2022-09-26 | End: 2022-10-27

## 2022-09-26 RX ORDER — METHOCARBAMOL 500 MG/1
500 TABLET, FILM COATED ORAL 4 TIMES DAILY PRN
Qty: 20 TABLET | Refills: 0 | Status: SHIPPED | OUTPATIENT
Start: 2022-09-26 | End: 2022-10-27 | Stop reason: ALTCHOICE

## 2022-09-26 RX ORDER — KETOROLAC TROMETHAMINE 30 MG/ML
30 INJECTION, SOLUTION INTRAMUSCULAR; INTRAVENOUS ONCE
Status: COMPLETED | OUTPATIENT
Start: 2022-09-26 | End: 2022-09-26

## 2022-09-26 RX ORDER — DIAZEPAM 5 MG/ML
10 INJECTION, SOLUTION INTRAMUSCULAR; INTRAVENOUS ONCE
Status: COMPLETED | OUTPATIENT
Start: 2022-09-26 | End: 2022-09-26

## 2022-09-26 RX ORDER — HYDROCODONE BITARTRATE AND ACETAMINOPHEN 5; 325 MG/1; MG/1
1 TABLET ORAL EVERY 6 HOURS PRN
Qty: 10 TABLET | Refills: 0 | Status: SHIPPED | OUTPATIENT
Start: 2022-09-26 | End: 2022-10-06

## 2022-09-26 RX ORDER — LIDOCAINE 50 MG/G
1 PATCH TOPICAL ONCE
Status: DISCONTINUED | OUTPATIENT
Start: 2022-09-26 | End: 2022-09-27 | Stop reason: HOSPADM

## 2022-09-26 RX ORDER — ACETAMINOPHEN 325 MG/1
975 TABLET ORAL ONCE
Status: COMPLETED | OUTPATIENT
Start: 2022-09-26 | End: 2022-09-26

## 2022-09-26 RX ORDER — PREDNISONE 20 MG/1
60 TABLET ORAL ONCE
Status: COMPLETED | OUTPATIENT
Start: 2022-09-26 | End: 2022-09-26

## 2022-09-26 RX ADMIN — DIAZEPAM 10 MG: 5 INJECTION, SOLUTION INTRAMUSCULAR; INTRAVENOUS at 21:10

## 2022-09-26 RX ADMIN — ACETAMINOPHEN 975 MG: 325 TABLET, FILM COATED ORAL at 21:07

## 2022-09-26 RX ADMIN — PREDNISONE 60 MG: 20 TABLET ORAL at 21:07

## 2022-09-26 RX ADMIN — LIDOCAINE 5% 1 PATCH: 700 PATCH TOPICAL at 21:08

## 2022-09-26 RX ADMIN — KETOROLAC TROMETHAMINE 30 MG: 30 INJECTION, SOLUTION INTRAMUSCULAR; INTRAVENOUS at 21:10

## 2022-09-27 DIAGNOSIS — U07.1 COVID-19: ICD-10-CM

## 2022-09-27 RX ORDER — DEXAMETHASONE 4 MG/1
TABLET ORAL
Qty: 12 G | Refills: 0 | Status: SHIPPED | OUTPATIENT
Start: 2022-09-27 | End: 2022-10-27 | Stop reason: ALTCHOICE

## 2022-09-27 NOTE — ED PROVIDER NOTES
History  Chief Complaint   Patient presents with    Back Pain     Severe pain in L side back since this morning  Pt states leg and foot are numb and feels he is gradually getting worse  No known trauma to area  Left low back pain radiating down to the left toes associated tingling and numbness worse with movements gradually throughout the day did not take anything for pain yet  Difficulty ambulating due to the pain  No loss of bladder or bowel control  No specific injury  Prior to Admission Medications   Prescriptions Last Dose Informant Patient Reported?  Taking?   brompheniramine-pseudoephedrine-DM 30-2-10 MG/5ML syrup   Yes No   Sig: take 5ml by mouth every 4 to 6 hours   busPIRone (BUSPAR) 15 mg tablet   No No   Sig: TAKE 1 TABLET BY MOUTH TWICE A DAY   famotidine (PEPCID) 20 mg tablet   No No   Sig: TAKE 1 TABLET BY MOUTH EVERY DAY   hydrocodone-chlorpheniramine polistirex (TUSSIONEX) 10-8 mg/5 mL ER suspension   No No   Sig: Take 5 mL by mouth every 12 (twelve) hours as needed for cough Max Daily Amount: 10 mL   levothyroxine 50 mcg tablet   No No   Sig: TAKE 1 TABLET BY MOUTH EVERY DAY   losartan (COZAAR) 50 mg tablet   No No   Sig: TAKE ONE TABLET BY MOUTH EVERY DAY   methocarbamol (ROBAXIN) 500 mg tablet  Self No No   Sig: Take 1 tablet (500 mg total) by mouth 2 (two) times a day as needed for muscle spasms   methocarbamol (ROBAXIN) 500 mg tablet   No Yes   Sig: Take 1 tablet (500 mg total) by mouth 4 (four) times a day as needed for muscle spasms   pantoprazole (PROTONIX) 40 mg tablet   No No   Sig: Take 1 tablet (40 mg total) by mouth daily   rosuvastatin (CRESTOR) 10 MG tablet   No No   Sig: TAKE 1 TABLET BY MOUTH EVERY DAY   tadalafil (CIALIS) 5 MG tablet  Self No No   Sig: Take 1 tablet (5 mg total) by mouth daily      Facility-Administered Medications: None       Past Medical History:   Diagnosis Date    Acute sinusitis 2011    Anxiety     Cellulitis 2011    Cellulitis of neck 2010    Colon polyp     Facial cellulitis 2010    Hypercholesterolemia     Lymph nodes enlarged     Sensory disturbance     Sleep apnea        Past Surgical History:   Procedure Laterality Date    COLONOSCOPY         Family History   Problem Relation Age of Onset    Hypertension Mother     Hyperlipidemia Mother     Coronary artery disease Father     Heart attack Brother         B/D 52 MI    Leukemia Brother     Leukemia Son     Mental illness Neg Hx     Substance Abuse Neg Hx     Alcohol abuse Neg Hx     Colon polyps Neg Hx     Colon cancer Neg Hx      I have reviewed and agree with the history as documented  E-Cigarette/Vaping    E-Cigarette Use Never User      E-Cigarette/Vaping Substances    Nicotine No     THC No     CBD No     Flavoring No     Other No     Unknown No      Social History     Tobacco Use    Smoking status: Never Smoker    Smokeless tobacco: Never Used    Tobacco comment: DENIED: HISTORY OF TOBACCO USE   Vaping Use    Vaping Use: Never used   Substance Use Topics    Alcohol use: Yes     Alcohol/week: 2 0 standard drinks     Types: 2 Cans of beer per week     Comment: SOCIAL; 2 BEERS A DAY AS PER ALL SCRIPTS     Drug use: No       Review of Systems   Constitutional: Negative for chills and fever  HENT: Negative for rhinorrhea and sore throat  Respiratory: Negative for shortness of breath  Cardiovascular: Negative for chest pain  Gastrointestinal: Negative for constipation, diarrhea, nausea and vomiting  Genitourinary: Negative for dysuria and frequency  Skin: Negative for rash  All other systems reviewed and are negative  Physical Exam  Physical Exam  Vitals and nursing note reviewed  Constitutional:       Appearance: He is well-developed  HENT:      Head: Normocephalic and atraumatic        Right Ear: External ear normal       Left Ear: External ear normal       Nose: Nose normal    Eyes:      Conjunctiva/sclera: Conjunctivae normal  Pupils: Pupils are equal, round, and reactive to light  Cardiovascular:      Rate and Rhythm: Normal rate and regular rhythm  Heart sounds: Normal heart sounds  Pulmonary:      Effort: Pulmonary effort is normal  No respiratory distress  Breath sounds: Normal breath sounds  No wheezing  Abdominal:      General: Bowel sounds are normal  There is no distension  Palpations: Abdomen is soft  Tenderness: There is no abdominal tenderness  Musculoskeletal:         General: No deformity  Normal range of motion  Cervical back: Normal range of motion and neck supple  No bony tenderness  No spinous process tenderness  Thoracic back: No tenderness or bony tenderness  Lumbar back: Spasms present  No tenderness or bony tenderness  Negative right straight leg raise test and negative left straight leg raise test    Skin:     General: Skin is warm and dry  Findings: No rash  Neurological:      General: No focal deficit present  Mental Status: He is alert  GCS: GCS eye subscore is 4  GCS verbal subscore is 5  GCS motor subscore is 6  Sensory: Sensation is intact  No sensory deficit  Motor: Motor function is intact  Coordination: Coordination is intact        Gait: Gait abnormal       Comments: Pain with movement of low back   Psychiatric:         Mood and Affect: Mood normal          Vital Signs  ED Triage Vitals   Temperature Pulse Respirations Blood Pressure SpO2   09/26/22 1617 09/26/22 1617 09/26/22 1617 09/26/22 1617 09/26/22 2100   97 9 °F (36 6 °C) 101 20 (!) 179/90 99 %      Temp Source Heart Rate Source Patient Position - Orthostatic VS BP Location FiO2 (%)   09/26/22 1617 09/26/22 1617 09/26/22 1617 09/26/22 1617 --   Temporal Monitor Lying Left arm       Pain Score       09/26/22 1617       6           Vitals:    09/26/22 1617 09/26/22 2100   BP: (!) 179/90 162/84   Pulse: 101 97   Patient Position - Orthostatic VS: Lying          Visual Acuity      ED Medications  Medications   diazepam (VALIUM) injection 10 mg (10 mg Intramuscular Given 9/26/22 2110)   acetaminophen (TYLENOL) tablet 975 mg (975 mg Oral Given 9/26/22 2107)   predniSONE tablet 60 mg (60 mg Oral Given 9/26/22 2107)   ketorolac (TORADOL) injection 30 mg (30 mg Intramuscular Given 9/26/22 2110)       Diagnostic Studies  Results Reviewed     None                 No orders to display              Procedures  Procedures         ED Course                                             MDM  Number of Diagnoses or Management Options  Left lumbar radiculopathy: new and does not require workup  Strain of lumbar region, initial encounter: new and does not require workup     Amount and/or Complexity of Data Reviewed  Obtain history from someone other than the patient: yes    Patient Progress  Patient progress: improved      Disposition  Final diagnoses:   Left lumbar radiculopathy   Strain of lumbar region, initial encounter     Time reflects when diagnosis was documented in both MDM as applicable and the Disposition within this note     Time User Action Codes Description Comment    9/26/2022 10:05 PM [employer] Add [M54 16] Left lumbar radiculopathy     9/26/2022 10:05 PM [employer] Add [S39 012A] Strain of lumbar region, initial encounter     9/26/2022 10:07 PM [employer] Add [I05 530] Back muscle spasm       ED Disposition     ED Disposition   Discharge    Condition   Stable    Date/Time   Mon Sep 26, 2022 10:05 PM    Comment   Lenell Line discharge to home/self care                 Follow-up Information     Follow up With Specialties Details Why Contact Info Additional [address] [facility] Pain Medicine Call   [address] [facility] [phone]  [address] [facility], [address] [place] [address]          Discharge Medication List as of 9/26/2022 10:12 PM START taking these medications    Details   HYDROcodone-acetaminophen (Norco) 5-325 mg per tablet Take 1 tablet by mouth every 6 (six) hours as needed (severe pain) for up to 10 days Max Daily Amount: 4 tablets, Starting Mon 9/26/2022, Until Thu 10/6/2022 at 2359, Normal      methylprednisolone (MEDROL) 4 mg tablet Take 1 tablet (4 mg total) by mouth see administration instructions Medrol dose pack take as directed, Starting Mon 9/26/2022, Normal         CONTINUE these medications which have CHANGED    Details   methocarbamol (ROBAXIN) 500 mg tablet Take 1 tablet (500 mg total) by mouth 4 (four) times a day as needed for muscle spasms, Starting Mon 9/26/2022, Normal         CONTINUE these medications which have NOT CHANGED    Details   brompheniramine-pseudoephedrine-DM 30-2-10 MG/5ML syrup take 5ml by mouth every 4 to 6 hours, Historical Med      busPIRone (BUSPAR) 15 mg tablet TAKE 1 TABLET BY MOUTH TWICE A DAY, Normal      famotidine (PEPCID) 20 mg tablet TAKE 1 TABLET BY MOUTH EVERY DAY, Normal      levothyroxine 50 mcg tablet TAKE 1 TABLET BY MOUTH EVERY DAY, Normal      losartan (COZAAR) 50 mg tablet TAKE ONE TABLET BY MOUTH EVERY DAY, Normal      pantoprazole (PROTONIX) 40 mg tablet Take 1 tablet (40 mg total) by mouth daily, Starting Wed 1/26/2022, Until Fri 2/25/2022, Normal      rosuvastatin (CRESTOR) 10 MG tablet TAKE 1 TABLET BY MOUTH EVERY DAY, Normal      tadalafil (CIALIS) 5 MG tablet Take 1 tablet (5 mg total) by mouth daily, Starting Tue 12/7/2021, Normal      fluticasone (Flovent HFA) 110 MCG/ACT inhaler Inhale 4 puffs 2 (two) times a day Rinse mouth after use , Starting Fri 8/12/2022, Normal         STOP taking these medications       hydrocodone-chlorpheniramine polistirex (TUSSIONEX) 10-8 mg/5 mL ER suspension Comments:   Reason for Stopping:                   PDMP Review       Value Time User    PDMP Reviewed  Yes 8/12/2022 10:20 AM [facility]  Electronically Signed by           Anastasia Dia, DO  09/27/22 [id number]

## 2022-09-29 ENCOUNTER — NURSE TRIAGE (OUTPATIENT)
Dept: PHYSICAL THERAPY | Facility: OTHER | Age: 52
End: 2022-09-29

## 2022-09-29 ENCOUNTER — TELEPHONE (OUTPATIENT)
Dept: PHYSICAL THERAPY | Facility: OTHER | Age: 52
End: 2022-09-29

## 2022-09-29 DIAGNOSIS — M54.50 ACUTE LEFT-SIDED LOW BACK PAIN, UNSPECIFIED WHETHER SCIATICA PRESENT: Primary | ICD-10-CM

## 2022-09-29 NOTE — TELEPHONE ENCOUNTER
Additional Information   Negative: Are you currently recieving homecare services?  Negative: Is this related to an MVA?  Negative: Is this related to a work injury?  Negative: Has the patient had unexplained weight loss?  Negative: Does the patient have a fever?  Negative: Is the patient experiencing urine retention?  Negative: Is the patient experiencing blood in sputum?  Negative: Has the patient experienced major trauma? (fall from height, high speed collision, direct blow to spine) and is also experiencing nausea, light-headedness, or loss of consciousness?  Negative: Is the patient experiencing acute drop foot or paralysis?  Negative: Is this a chronic condition? Background - Initial Assessment  Clinical complaint: Left sided low back pain that radiates down LLE  Date of onset: Started on 9/26/22- [facility] Warranted ED visit  Frequency of pain: constant  Quality of pain: sharp, shooting, and stabbing    Protocols used: [facility] COMPREHENSIVE SPINE PROGRAM PROTOCOL    Patient seen in ED 9/26/22-PLEASE SEE NOTES  This RN did review the Comprehensive Spine Program in detail and what we offer for their back or neck pain  Patient is agreeable to triage and would like to proceed w/ Evaluation/Sessions with [facility] therapist     Triaged and NO RF s/s present  Patients information was sent to the preferred site and patient made aware clerical would be calling to schedule appointment  Contact information for the [address] [place] [address] site was provided for the patient as well  Nurse also offered a call from the [facility] counselor d/t SBT score  Patient declined  Patient was pleasant and appropriate during this encounter  Patient appreciative of call   Nurse wished patient well and referral closed per protocol  Patient did not voice any questions and/or concerns  All information about patients intended careplan reviewed   Patient in agreement with nurse's explanation of referral and treatment plan

## 2022-10-17 DIAGNOSIS — I10 BENIGN ESSENTIAL HTN: ICD-10-CM

## 2022-10-18 DIAGNOSIS — I10 ESSENTIAL HYPERTENSION: Primary | ICD-10-CM

## 2022-10-18 DIAGNOSIS — E78.2 MIXED HYPERLIPIDEMIA: ICD-10-CM

## 2022-10-18 DIAGNOSIS — R73.03 PREDIABETES: ICD-10-CM

## 2022-10-18 DIAGNOSIS — E03.8 OTHER SPECIFIED HYPOTHYROIDISM: ICD-10-CM

## 2022-10-18 DIAGNOSIS — E55.9 VITAMIN D DEFICIENCY: ICD-10-CM

## 2022-10-18 DIAGNOSIS — Z12.5 PROSTATE CANCER SCREENING: ICD-10-CM

## 2022-10-18 RX ORDER — LOSARTAN POTASSIUM 50 MG/1
TABLET ORAL
Qty: 30 TABLET | Refills: 0 | Status: SHIPPED | OUTPATIENT
Start: 2022-10-18

## 2022-10-19 DIAGNOSIS — E03.9 HYPOTHYROIDISM, UNSPECIFIED TYPE: ICD-10-CM

## 2022-10-20 RX ORDER — LEVOTHYROXINE SODIUM 0.05 MG/1
TABLET ORAL
Qty: 30 TABLET | Refills: 2 | Status: SHIPPED | OUTPATIENT
Start: 2022-10-20

## 2022-10-25 LAB
25(OH)D3+25(OH)D2 SERPL-MCNC: 29.8 NG/ML (ref 30–100)
ALBUMIN SERPL-MCNC: 4.5 G/DL (ref 3.8–4.9)
ALBUMIN/GLOB SERPL: 2 {RATIO} (ref 1.2–2.2)
ALP SERPL-CCNC: 69 IU/L (ref 44–121)
ALT SERPL-CCNC: 29 IU/L (ref 0–44)
AST SERPL-CCNC: 19 IU/L (ref 0–40)
BASOPHILS # BLD AUTO: 0.1 X10E3/UL (ref 0–0.2)
BASOPHILS NFR BLD AUTO: 1 %
BILIRUB SERPL-MCNC: 1.1 MG/DL (ref 0–1.2)
BUN SERPL-MCNC: 16 MG/DL (ref 6–24)
BUN/CREAT SERPL: 15 (ref 9–20)
CALCIUM SERPL-MCNC: 9.6 MG/DL (ref 8.7–10.2)
CHLORIDE SERPL-SCNC: 100 MMOL/L (ref 96–106)
CHOLEST SERPL-MCNC: 172 MG/DL (ref 100–199)
CHOLEST/HDLC SERPL: 3.5 RATIO (ref 0–5)
CO2 SERPL-SCNC: 22 MMOL/L (ref 20–29)
CREAT SERPL-MCNC: 1.07 MG/DL (ref 0.76–1.27)
EGFR: 83 ML/MIN/1.73
EOSINOPHIL # BLD AUTO: 0.1 X10E3/UL (ref 0–0.4)
EOSINOPHIL NFR BLD AUTO: 2 %
ERYTHROCYTE [DISTWIDTH] IN BLOOD BY AUTOMATED COUNT: 13.6 % (ref 11.6–15.4)
EST. AVERAGE GLUCOSE BLD GHB EST-MCNC: 120 MG/DL
GLOBULIN SER-MCNC: 2.3 G/DL (ref 1.5–4.5)
GLUCOSE SERPL-MCNC: 120 MG/DL (ref 70–99)
HBA1C MFR BLD: 5.8 % (ref 4.8–5.6)
HCT VFR BLD AUTO: 45.4 % (ref 37.5–51)
HDLC SERPL-MCNC: 49 MG/DL
HGB BLD-MCNC: 15.1 G/DL (ref 13–17.7)
IMM GRANULOCYTES # BLD: 0 X10E3/UL (ref 0–0.1)
IMM GRANULOCYTES NFR BLD: 1 %
LDLC SERPL CALC-MCNC: 81 MG/DL (ref 0–99)
LDLC SERPL DIRECT ASSAY-MCNC: 86 MG/DL (ref 0–99)
LYMPHOCYTES # BLD AUTO: 2.3 X10E3/UL (ref 0.7–3.1)
LYMPHOCYTES NFR BLD AUTO: 40 %
MCH RBC QN AUTO: 28.9 PG (ref 26.6–33)
MCHC RBC AUTO-ENTMCNC: 33.3 G/DL (ref 31.5–35.7)
MCV RBC AUTO: 87 FL (ref 79–97)
MONOCYTES # BLD AUTO: 0.4 X10E3/UL (ref 0.1–0.9)
MONOCYTES NFR BLD AUTO: 6 %
NEUTROPHILS # BLD AUTO: 2.9 X10E3/UL (ref 1.4–7)
NEUTROPHILS NFR BLD AUTO: 50 %
PLATELET # BLD AUTO: 255 X10E3/UL (ref 150–450)
POTASSIUM SERPL-SCNC: 4.5 MMOL/L (ref 3.5–5.2)
PROT SERPL-MCNC: 6.8 G/DL (ref 6–8.5)
PSA SERPL-MCNC: 4.9 NG/ML (ref 0–4)
RBC # BLD AUTO: 5.22 X10E6/UL (ref 4.14–5.8)
SL AMB % FREE PSA: 19.8 %
SL AMB PSA, FREE: 0.97 NG/ML
SL AMB REFLEX CRITERIA: ABNORMAL
SL AMB VLDL CHOLESTEROL CALC: 42 MG/DL (ref 5–40)
SODIUM SERPL-SCNC: 137 MMOL/L (ref 134–144)
TRIGL SERPL-MCNC: 254 MG/DL (ref 0–149)
TSH SERPL DL<=0.005 MIU/L-ACNC: 3.75 UIU/ML (ref 0.45–4.5)
WBC # BLD AUTO: 5.7 X10E3/UL (ref 3.4–10.8)

## 2022-10-27 ENCOUNTER — OFFICE VISIT (OUTPATIENT)
Dept: FAMILY MEDICINE CLINIC | Facility: CLINIC | Age: 52
End: 2022-10-27

## 2022-10-27 ENCOUNTER — APPOINTMENT (OUTPATIENT)
Dept: RADIOLOGY | Facility: CLINIC | Age: 52
End: 2022-10-27
Payer: COMMERCIAL

## 2022-10-27 VITALS
OXYGEN SATURATION: 98 % | HEIGHT: 71 IN | SYSTOLIC BLOOD PRESSURE: 140 MMHG | HEART RATE: 76 BPM | RESPIRATION RATE: 15 BRPM | DIASTOLIC BLOOD PRESSURE: 80 MMHG | BODY MASS INDEX: 35.03 KG/M2 | WEIGHT: 250.2 LBS

## 2022-10-27 DIAGNOSIS — E78.2 MIXED HYPERLIPIDEMIA: ICD-10-CM

## 2022-10-27 DIAGNOSIS — R97.20 ELEVATED PSA: ICD-10-CM

## 2022-10-27 DIAGNOSIS — F43.22 ADJUSTMENT DISORDER WITH ANXIETY: ICD-10-CM

## 2022-10-27 DIAGNOSIS — R73.09 ELEVATED HEMOGLOBIN A1C: ICD-10-CM

## 2022-10-27 DIAGNOSIS — R73.03 PREDIABETES: ICD-10-CM

## 2022-10-27 DIAGNOSIS — M54.50 LEFT LUMBAR PAIN: ICD-10-CM

## 2022-10-27 DIAGNOSIS — K21.01 GASTROESOPHAGEAL REFLUX DISEASE WITH ESOPHAGITIS AND HEMORRHAGE: ICD-10-CM

## 2022-10-27 DIAGNOSIS — G47.30 SLEEP APNEA, UNSPECIFIED TYPE: ICD-10-CM

## 2022-10-27 DIAGNOSIS — E03.8 OTHER SPECIFIED HYPOTHYROIDISM: ICD-10-CM

## 2022-10-27 DIAGNOSIS — E55.9 VITAMIN D DEFICIENCY: ICD-10-CM

## 2022-10-27 DIAGNOSIS — I10 ESSENTIAL HYPERTENSION: Primary | ICD-10-CM

## 2022-10-27 DIAGNOSIS — Z23 NEED FOR VACCINATION: ICD-10-CM

## 2022-10-27 PROCEDURE — 72110 X-RAY EXAM L-2 SPINE 4/>VWS: CPT

## 2022-10-27 RX ORDER — CYCLOBENZAPRINE HCL 10 MG
10 TABLET ORAL
Qty: 30 TABLET | Refills: 0 | Status: SHIPPED | OUTPATIENT
Start: 2022-10-27

## 2022-10-27 NOTE — PROGRESS NOTES
Assessment/Plan:     Diagnoses and all orders for this visit:    Essential hypertension    /80 today  Goal is <130/90  Pt does not check BP at home  He is on Losartan 50 mg daily  Last BP at OV was stable  Pt will check and record BP over the next 2 weeks and send me these readings so we can determine if patient needs to increased to 100 mg daily or stay at 50 mg daily  Patient is encouraged to call our office for any questions/concerns, persistent or worsening symptoms  Patient states they understand and agree with treatment plan  Need for vaccination  -     influenza vaccine, quadrivalent, recombinant, PF, 0 5 mL, for patients 18 yr+ (FLUBLOK)    Left lumbar pain  -     XR spine lumbar minimum 4 views non injury; Future  -     cyclobenzaprine (FLEXERIL) 10 mg tablet; Take 1 tablet (10 mg total) by mouth daily at bedtime as needed for muscle spasms    Check lumbar xray as patient has not had any imaging  Pt to utilize Flexeril HS PRN to help  Can consider PT based upon xray findings  Patient is encouraged to call our office for any questions/concerns, persistent or worsening symptoms  Patient states they understand and agree with treatment plan  Elevated PSA  -     Ambulatory Referral to Urology; Future    PSA steadily climbing over last several years from 2 8-->3 8-->4 9  Elevation can possibly be related to age  Urology referral ordered to be thorough  Vitamin D deficiency  -     Vitamin D 25 hydroxy; Future    Pt encouraged to start Vitamin D3 2,000 units daily  Elevated hemoglobin A1c  -     Hemoglobin A1C; Future    Last A1c 5 8% from 6%  Pt encouraged to continue diet and exercise  Recheck A1c in 6 months  Mixed hyperlipidemia  -     Lipid panel; Future    , Triglycerides 254, HDL 49 & LDL 81  Pt currently on Crestor 10 mg  ASCVD risk at 4 8% today  Continue diet and exercise  Repeat lipid panel in 6 months      Gastroesophageal reflux disease with esophagitis and hemorrhage    Stable with medication and diet modification  Continue same  Other specified hypothyroidism    TSH stable  Continue Levothyroxine 50 mcg daily  Sleep apnea, unspecified type    Pt recommended CPAP, however he admits to noncompliance  Adjustment disorder with anxiety      Stable with Buspar  Continue same  Pt to RTO in 6 months for recheck or sooner PRN  Subjective:      Patient ID: Regine Jackson is a 45 y o  male  Pt presents today for a 6 month recheck of his HTN, hyperlipidemia, hypothyroidism & prediabetes  Pt notes overall he is feeling well  In late September, pt did go to ER for lumbar back pain primarily on left side w/ radiation down leg  He admits he was given Robaxin, Medrol dose pack, Hydrocodone-Acetaminophen and encouraged to follow up with comprehensive spine program   Pt notes he did not have any imaging of his back done in ER  Pt denies any injury to the back  He notes the radiation down his leg has subsided as well as the pain, but he still notes the pain with extending sitting and standing  He denies fever, chills, loss of bowel/bladder control  He has tried prescribed medications listed above without any relief  The following portions of the patient's history were reviewed and updated as appropriate: allergies, current medications, past family history, past medical history, past social history, past surgical history and problem list     Review of Systems    As noted per HPI  Objective:      /80   Pulse 76   Resp 15   Ht 5' 11" (1 803 m)   Wt 113 kg (250 lb 3 2 oz)   SpO2 98%   BMI 34 90 kg/m²          Physical Exam  Vitals reviewed  Constitutional:       General: He is not in acute distress  Appearance: Normal appearance  He is not ill-appearing  Cardiovascular:      Rate and Rhythm: Normal rate and regular rhythm  Pulses: Normal pulses  Heart sounds: Normal heart sounds  No murmur heard    Pulmonary:      Effort: Pulmonary effort is normal  No respiratory distress  Breath sounds: Normal breath sounds  No wheezing  Abdominal:      Palpations: Abdomen is soft  Musculoskeletal:         General: Normal range of motion  Skin:     General: Skin is warm  Capillary Refill: Capillary refill takes less than 2 seconds  Neurological:      General: No focal deficit present  Mental Status: He is alert and oriented to person, place, and time  Mental status is at baseline  Motor: No weakness  Gait: Gait normal    Psychiatric:         Mood and Affect: Mood normal          Behavior: Behavior normal          Thought Content:  Thought content normal          Judgment: Judgment normal

## 2022-10-31 DIAGNOSIS — M51.36 NARROWING OF LUMBAR INTERVERTEBRAL DISC SPACE: Primary | ICD-10-CM

## 2022-11-18 DIAGNOSIS — I10 BENIGN ESSENTIAL HTN: ICD-10-CM

## 2022-11-18 RX ORDER — LOSARTAN POTASSIUM 50 MG/1
TABLET ORAL
Qty: 30 TABLET | Refills: 0 | Status: SHIPPED | OUTPATIENT
Start: 2022-11-18

## 2022-11-21 DIAGNOSIS — F43.22 ADJUSTMENT DISORDER WITH ANXIETY: ICD-10-CM

## 2022-11-21 RX ORDER — BUSPIRONE HYDROCHLORIDE 15 MG/1
TABLET ORAL
Qty: 60 TABLET | Refills: 2 | Status: SHIPPED | OUTPATIENT
Start: 2022-11-21

## 2022-11-22 DIAGNOSIS — M54.50 LEFT LUMBAR PAIN: ICD-10-CM

## 2022-11-22 RX ORDER — CYCLOBENZAPRINE HCL 10 MG
10 TABLET ORAL
Qty: 30 TABLET | Refills: 0 | Status: SHIPPED | OUTPATIENT
Start: 2022-11-22

## 2022-11-30 ENCOUNTER — OFFICE VISIT (OUTPATIENT)
Dept: URGENT CARE | Facility: CLINIC | Age: 52
End: 2022-11-30

## 2022-11-30 VITALS
RESPIRATION RATE: 16 BRPM | DIASTOLIC BLOOD PRESSURE: 93 MMHG | OXYGEN SATURATION: 97 % | HEART RATE: 94 BPM | SYSTOLIC BLOOD PRESSURE: 158 MMHG | TEMPERATURE: 96.4 F

## 2022-11-30 DIAGNOSIS — J20.9 ACUTE BRONCHITIS, UNSPECIFIED ORGANISM: ICD-10-CM

## 2022-11-30 DIAGNOSIS — R05.1 ACUTE COUGH: Primary | ICD-10-CM

## 2022-11-30 LAB
SARS-COV-2 AG UPPER RESP QL IA: NEGATIVE
VALID CONTROL: NORMAL

## 2022-11-30 RX ORDER — AZITHROMYCIN 250 MG/1
TABLET, FILM COATED ORAL
Qty: 6 TABLET | Refills: 0 | Status: SHIPPED | OUTPATIENT
Start: 2022-11-30 | End: 2022-12-04

## 2022-11-30 RX ORDER — ALBUTEROL SULFATE 90 UG/1
2 AEROSOL, METERED RESPIRATORY (INHALATION) EVERY 6 HOURS PRN
Qty: 8.5 G | Refills: 0 | Status: SHIPPED | OUTPATIENT
Start: 2022-11-30

## 2022-11-30 RX ORDER — METHYLPREDNISOLONE 4 MG/1
TABLET ORAL
Qty: 1 EACH | Refills: 0 | Status: SHIPPED | OUTPATIENT
Start: 2022-11-30

## 2022-11-30 NOTE — PATIENT INSTRUCTIONS
Patient was educated on bronchitis  Patient was educated on inhaler, antibiotics and steroids  Patient was educated on side effects  Patient was told if symptoms persist follow up with PCP    Acute Bronchitis   WHAT YOU NEED TO KNOW:   Acute bronchitis is swelling and irritation in your lungs  It is usually caused by a virus and most often happens in the winter  Bronchitis may also be caused by bacteria or by a chemical irritant, such as smoke  DISCHARGE INSTRUCTIONS:   Return to the emergency department if:   You cough up blood  Your lips or fingernails turn blue  You feel like you are not getting enough air when you breathe  Call your doctor if:   Your symptoms do not go away or get worse, even after treatment  Your cough does not get better within 4 weeks  You have questions or concerns about your condition or care  Medicines: You may  need any of the following:  Cough suppressants  decrease your urge to cough  Decongestants  help loosen mucus in your lungs and make it easier to cough up  This can help you breathe easier  Inhalers  may be given  Your healthcare provider may give you one or more inhalers to help you breathe easier and cough less  An inhaler gives your medicine to open your airways  Ask your healthcare provider to show you how to use your inhaler correctly  Antibiotics  may be given for up to 5 days if your bronchitis is caused by bacteria  Acetaminophen  decreases pain and fever  It is available without a doctor's order  Ask how much to take and how often to take it  Follow directions  Read the labels of all other medicines you are using to see if they also contain acetaminophen, or ask your doctor or pharmacist  Acetaminophen can cause liver damage if not taken correctly  Do not use more than 4 grams (4,000 milligrams) total of acetaminophen in one day  NSAIDs  help decrease swelling and pain or fever   This medicine is available with or without a doctor's order  NSAIDs can cause stomach bleeding or kidney problems in certain people  If you take blood thinner medicine, always ask your healthcare provider if NSAIDs are safe for you  Always read the medicine label and follow directions  Take your medicine as directed  Contact your healthcare provider if you think your medicine is not helping or if you have side effects  Tell him of her if you are allergic to any medicine  Keep a list of the medicines, vitamins, and herbs you take  Include the amounts, and when and why you take them  Bring the list or the pill bottles to follow-up visits  Carry your medicine list with you in case of an emergency  Self-care:   Drink liquids as directed  You may need to drink more liquids than usual to stay hydrated  Ask how much liquid to drink each day and which liquids are best for you  Use a cool mist humidifier  to increase air moisture in your home  This may make it easier for you to breathe and help decrease your cough  Get more rest   Rest helps your body to heal  Slowly start to do more each day  Rest when you feel it is needed  Avoid irritants in the air  Avoid chemicals, fumes, and dust  Wear a face mask if you must work around dust or fumes  Stay inside on days when air pollution levels are high  If you have allergies, stay inside when pollen counts are high  Do not use aerosol products, such as spray-on deodorant, bug spray, and hair spray  Do not smoke or be around others who are smoking  Nicotine and other chemicals in cigarettes and cigars can cause lung damage  Ask your healthcare provider for information if you currently smoke and need help to quit  E-cigarettes or smokeless tobacco still contain nicotine  Talk to your healthcare provider before you use these products  Prevent acute bronchitis:       Ask about vaccines you may need  Get a flu vaccine each year as soon as recommended, usually in September or October   Ask your healthcare provider if you should also get a pneumonia or COVID-19 vaccine  Your healthcare provider can tell you if you should also get other vaccines, and when to get them  Prevent the spread of germs  You can decrease your risk for acute bronchitis and other illnesses by doing the following:     Wash your hands often with soap and water  Carry germ-killing hand lotion or gel with you  You can use the lotion or gel to clean your hands when soap and water are not available  Do not touch your eyes, nose, or mouth unless you have washed your hands first     Always cover your mouth when you cough to prevent the spread of germs  It is best to cough into a tissue or your shirt sleeve instead of into your hand  Ask those around you to cover their mouths when they cough  Try to avoid people who have a cold or the flu  If you are sick, stay away from others as much as possible  Follow up with your doctor as directed:  Write down questions you have so you will remember to ask them during your follow-up visits  © Copyright "[employer] 2022 Information is for End User's use only and may not be sold, redistributed or otherwise used for commercial purposes  All illustrations and images included in CareNotes® are the copyrighted property of [employer]  or Tanya Morris   The above information is an  only  It is not intended as medical advice for individual conditions or treatments  Talk to your doctor, nurse or pharmacist before following any medical regimen to see if it is safe and effective for you

## 2022-11-30 NOTE — PROGRESS NOTES
[address] [facility]        NAME: Pepito Ga is a 47 y o  male  : 1970    MRN: [id number]  DATE: 2022  TIME: 4:12 PM    Assessment and Plan   Acute cough [R05 1]  1  Acute cough  Poct Covid 19 Rapid Antigen Test      2  Acute bronchitis, unspecified organism  azithromycin (ZITHROMAX) 250 mg tablet    albuterol (ProAir HFA) 90 mcg/act inhaler    methylPREDNISolone 4 MG tablet therapy pack        Rapid COVID 19- negative    Patient Instructions       Patient was educated on bronchitis  Patient was educated on inhaler, antibiotics and steroids  Patient was educated on side effects  Patient was told if symptoms persist follow up with PCP    Chief Complaint     Chief Complaint   Patient presents with   • Cough     Pt reports cough since Friday, this is the third time he's had such a cough since July, home COVID test negative on Saturday  No other symptoms other than the cough and chest congestion  History of Present Illness       Patient is here today complaining of a cough for 5 days  Patient reports cough is dry  Patient reports he has been treated for this 3 other times  Denies any allergies to medications  Denies any current history of asthma or diabetes  Review of Systems   Review of Systems   Constitutional: Negative  Respiratory: Positive for cough  Cardiovascular: Negative  Neurological: Negative  Psychiatric/Behavioral: Negative            Current Medications       Current Outpatient Medications:   •  albuterol (ProAir HFA) 90 mcg/act inhaler, Inhale 2 puffs every 6 (six) hours as needed for wheezing, Disp: 8 5 g, Rfl: 0  •  azithromycin (ZITHROMAX) 250 mg tablet, Take 2 tablets today then 1 tablet daily x 4 days, Disp: 6 tablet, Rfl: 0  •  methylPREDNISolone 4 MG tablet therapy pack, Use as directed on package, Disp: 1 each, Rfl: 0  •  busPIRone (BUSPAR) 15 mg tablet, TAKE 1 TABLET BY MOUTH TWICE A DAY, Disp: 60 tablet, Rfl: 2  •  cyclobenzaprine (FLEXERIL) 10 mg tablet, TAKE 1 TABLET (10 MG TOTAL) BY MOUTH DAILY AT BEDTIME AS NEEDED FOR MUSCLE SPASMS, Disp: 30 tablet, Rfl: 0  •  famotidine (PEPCID) 20 mg tablet, TAKE 1 TABLET BY MOUTH EVERY DAY, Disp: 30 tablet, Rfl: 1  •  levothyroxine 50 mcg tablet, TAKE 1 TABLET BY MOUTH EVERY DAY, Disp: 30 tablet, Rfl: 2  •  losartan (COZAAR) 50 mg tablet, TAKE ONE TABLET BY MOUTH EVERY DAY, Disp: 30 tablet, Rfl: 0  •  pantoprazole (PROTONIX) 40 mg tablet, Take 1 tablet (40 mg total) by mouth daily, Disp: 30 tablet, Rfl: 12  •  rosuvastatin (CRESTOR) 10 MG tablet, TAKE 1 TABLET BY MOUTH EVERY DAY, Disp: 30 tablet, Rfl: 11  •  tadalafil (CIALIS) 5 MG tablet, Take 1 tablet (5 mg total) by mouth daily, Disp: 30 tablet, Rfl: 11    Current Allergies     Allergies as of 11/30/2022 - Reviewed 11/30/2022   Allergen Reaction Noted   • Ace inhibitors Cough 11/29/2018   • Penicillins Other (See Comments) 04/09/2012            The following portions of the patient's history were reviewed and updated as appropriate: allergies, current medications, past family history, past medical history, past social history, past surgical history and problem list      Past Medical History:   Diagnosis Date   • Acute sinusitis 2011   • Anxiety    • Cellulitis 2011   • Cellulitis of neck 2010   • Colon polyp    • Facial cellulitis 2010   • Hypercholesterolemia    • Lymph nodes enlarged    • Sensory disturbance    • Sleep apnea        Past Surgical History:   Procedure Laterality Date   • COLONOSCOPY         Family History   Problem Relation Age of Onset   • Hypertension Mother    • Hyperlipidemia Mother    • Coronary artery disease Father    • Heart attack Brother         B/D 52 MI   • Leukemia Brother    • Leukemia Son    • Mental illness Neg Hx    • Substance Abuse Neg Hx    • Alcohol abuse Neg Hx    • Colon polyps Neg Hx    • Colon cancer Neg Hx          Medications have been verified          Objective   /93   Pulse 94   Temp (!) 96 4 °F (35 8 °C)   Resp 16   SpO2 97%   No LMP for male patient  Physical Exam     Physical Exam  Vitals and nursing note reviewed  Constitutional:       Appearance: Normal appearance  HENT:      Head: Normocephalic  Comments: NO pain over frontal or maxillary sinus     Right Ear: Tympanic membrane, ear canal and external ear normal       Left Ear: Tympanic membrane, ear canal and external ear normal       Mouth/Throat:      Mouth: Mucous membranes are moist       Pharynx: No posterior oropharyngeal erythema  Cardiovascular:      Rate and Rhythm: Normal rate and regular rhythm  Heart sounds: Normal heart sounds  Pulmonary:      Breath sounds: Wheezing present  Neurological:      Mental Status: He is alert and oriented to person, place, and time     Psychiatric:         Mood and Affect: Mood normal          Behavior: Behavior normal

## 2022-12-22 ENCOUNTER — TELEPHONE (OUTPATIENT)
Dept: UROLOGY | Facility: AMBULATORY SURGERY CENTER | Age: 52
End: 2022-12-22

## 2022-12-22 NOTE — TELEPHONE ENCOUNTER
Hello,    Patient is going to be seen at [address] [place] [address] Urology (NPI: [id number]) on 1/10/2023, and a insurance referral is needed for the visit  The diagnosis code needed for the appointment is R97 20, and a procedure code of 79698  That is everything needed, thank you for your time and help

## 2022-12-25 DIAGNOSIS — R35.0 URINARY FREQUENCY: ICD-10-CM

## 2022-12-25 DIAGNOSIS — N52.9 ERECTILE DYSFUNCTION, UNSPECIFIED ERECTILE DYSFUNCTION TYPE: ICD-10-CM

## 2022-12-26 DIAGNOSIS — I10 BENIGN ESSENTIAL HTN: ICD-10-CM

## 2022-12-26 RX ORDER — LOSARTAN POTASSIUM 50 MG/1
TABLET ORAL
Qty: 30 TABLET | Refills: 0 | Status: SHIPPED | OUTPATIENT
Start: 2022-12-26 | End: 2022-12-28

## 2022-12-28 DIAGNOSIS — I10 BENIGN ESSENTIAL HTN: ICD-10-CM

## 2022-12-28 RX ORDER — LOSARTAN POTASSIUM 50 MG/1
TABLET ORAL
Qty: 30 TABLET | Refills: 0 | Status: SHIPPED | OUTPATIENT
Start: 2022-12-28

## 2022-12-28 RX ORDER — TADALAFIL 5 MG/1
TABLET ORAL
Qty: 30 TABLET | Refills: 11 | Status: SHIPPED | OUTPATIENT
Start: 2022-12-28

## 2022-12-31 DIAGNOSIS — M54.50 LEFT LUMBAR PAIN: ICD-10-CM

## 2023-01-01 RX ORDER — CYCLOBENZAPRINE HCL 10 MG
10 TABLET ORAL
Qty: 30 TABLET | Refills: 0 | Status: SHIPPED | OUTPATIENT
Start: 2023-01-01

## 2023-01-10 ENCOUNTER — OFFICE VISIT (OUTPATIENT)
Dept: UROLOGY | Facility: HOSPITAL | Age: 53
End: 2023-01-10

## 2023-01-10 VITALS
OXYGEN SATURATION: 96 % | DIASTOLIC BLOOD PRESSURE: 90 MMHG | HEIGHT: 71 IN | WEIGHT: 253 LBS | SYSTOLIC BLOOD PRESSURE: 142 MMHG | HEART RATE: 95 BPM | BODY MASS INDEX: 35.42 KG/M2

## 2023-01-10 DIAGNOSIS — R97.20 ELEVATED PSA: ICD-10-CM

## 2023-01-10 NOTE — PROGRESS NOTES
01/10/23    Candida Rodríguez   1970   [id number]     Assessment  1 Elevated PSA     Discussion/Plan  1 Elevated PSA    10/24/22 PSA 4 9   Reviewed causes for false elevation of PSA    Repeat PSA now   mpMRI prostate recommended if PSA remains elevated     Patient will obtain repeat blood work and we will review over the phone  If PSA remains elevated, we will proceed with mpMRI prostate  He is agreeable  This will allow us to pursue MRI fusion biopsy and tissue sampling if indicated  All questions answered at this time  Subjective  HPI   Candida Rodríguez is a 47year old male who presents in follow up for evaluation of elevated PSA  He denies family history of  malignancy  He denies gross hematuria, dysuria, pelvic/bone pain or unintentional weight loss  He denies lower urinary tract symptoms  He takes Cialis daily for nocturia which has provided him relief  He has a family history of kidney stones  Component      Latest Ref Rng & Units 8/3/2020 9/21/2021 10/24/2022          12:34 PM  8:14 AM 10:41 AM   PSA, Total      0 0 - 4 0 ng/mL 2 8 3 8 4 9 (H)     Review of Systems - History obtained from chart review and the patient  General ROS: negative  Psychological ROS: negative  Respiratory ROS: negative  Cardiovascular ROS: negative  Gastrointestinal ROS: negative  Genito-Urinary ROS: negative  Musculoskeletal ROS: negative  Neurological ROS: negative  Dermatological ROS: negative       Objective  Physical Exam  Vitals and nursing note reviewed  Constitutional:       General: He is awake  He is not in acute distress  Appearance: Normal appearance  He is well-developed, well-groomed and normal weight  He is not ill-appearing, toxic-appearing or diaphoretic  Pulmonary:      Effort: Pulmonary effort is normal    Abdominal:      Tenderness: There is no right CVA tenderness or left CVA tenderness  Musculoskeletal:         General: Normal range of motion        Cervical back: Normal range of motion and neck supple  Skin:     General: Skin is warm  Neurological:      General: No focal deficit present  Mental Status: He is alert and oriented to person, place, and time  Mental status is at baseline  Psychiatric:         Attention and Perception: Attention normal          Mood and Affect: Mood normal          Speech: Speech normal          Behavior: Behavior normal  Behavior is cooperative  Thought Content: Thought content normal          Cognition and Memory: Cognition normal          Judgment: Judgment normal               CHICHI Jean     I have spent 15 minutes with Patient  today in which greater than 50% of this time was spent in counseling/coordination of care regarding Diagnostic results

## 2023-01-12 ENCOUNTER — TELEPHONE (OUTPATIENT)
Dept: UROLOGY | Facility: HOSPITAL | Age: 53
End: 2023-01-12

## 2023-01-12 DIAGNOSIS — R97.20 ELEVATED PSA: Primary | ICD-10-CM

## 2023-01-12 LAB — PSA SERPL-MCNC: 4.2 NG/ML (ref 0–4)

## 2023-01-12 NOTE — TELEPHONE ENCOUNTER
Pt called and made the MRI appointment and he would like to have the lab script sent to him via mail  His address has been verified       If any questions his number is [phone]

## 2023-01-12 NOTE — TELEPHONE ENCOUNTER
PSA remains elevated  MRI ordered  Patient was agreeable to proceeding with imaging  Please assist with scheduling   Thanks!

## 2023-01-12 NOTE — TELEPHONE ENCOUNTER
JAZ for Yasmany with the number to schedule his -[phone] and also that he would need to get blood that was ordered a week prior to having MRI

## 2023-01-19 NOTE — TELEPHONE ENCOUNTER
Pt called and left VM requesting c/b from Leandro Ritter regarding upcoming MRI     Pt call [phone]

## 2023-01-26 DIAGNOSIS — I10 BENIGN ESSENTIAL HTN: ICD-10-CM

## 2023-01-26 RX ORDER — LOSARTAN POTASSIUM 50 MG/1
TABLET ORAL
Qty: 30 TABLET | Refills: 0 | Status: SHIPPED | OUTPATIENT
Start: 2023-01-26

## 2023-01-30 DIAGNOSIS — E03.9 HYPOTHYROIDISM, UNSPECIFIED TYPE: ICD-10-CM

## 2023-01-30 RX ORDER — LEVOTHYROXINE SODIUM 0.05 MG/1
TABLET ORAL
Qty: 30 TABLET | Refills: 2 | Status: SHIPPED | OUTPATIENT
Start: 2023-01-30

## 2023-01-31 DIAGNOSIS — K21.00 GASTROESOPHAGEAL REFLUX DISEASE WITH ESOPHAGITIS WITHOUT HEMORRHAGE: ICD-10-CM

## 2023-01-31 RX ORDER — PANTOPRAZOLE SODIUM 40 MG/1
TABLET, DELAYED RELEASE ORAL
Qty: 30 TABLET | Refills: 12 | Status: SHIPPED | OUTPATIENT
Start: 2023-01-31

## 2023-02-04 DIAGNOSIS — M54.50 LEFT LUMBAR PAIN: ICD-10-CM

## 2023-02-06 RX ORDER — CYCLOBENZAPRINE HCL 10 MG
10 TABLET ORAL
Qty: 30 TABLET | Refills: 0 | Status: SHIPPED | OUTPATIENT
Start: 2023-02-06

## 2023-02-09 LAB
BUN SERPL-MCNC: 14 MG/DL (ref 6–24)
BUN/CREAT SERPL: 12 (ref 9–20)
CREAT SERPL-MCNC: 1.14 MG/DL (ref 0.76–1.27)
EGFR: 77 ML/MIN/1.73

## 2023-02-15 ENCOUNTER — HOSPITAL ENCOUNTER (OUTPATIENT)
Dept: RADIOLOGY | Age: 53
Discharge: HOME | End: 2023-02-15

## 2023-02-15 DIAGNOSIS — R97.20 ELEVATED PSA: ICD-10-CM

## 2023-02-15 RX ADMIN — GADOBUTROL 11 ML: 604.72 INJECTION INTRAVENOUS at 14:48

## 2023-02-24 ENCOUNTER — TELEPHONE (OUTPATIENT)
Dept: RADIOLOGY | Facility: HOSPITAL | Age: 53
End: 2023-02-24

## 2023-02-24 ENCOUNTER — TELEPHONE (OUTPATIENT)
Dept: OTHER | Facility: OTHER | Age: 53
End: 2023-02-24

## 2023-02-24 NOTE — NURSING NOTE
Patient returned call to follow up with extravasation of contrast that was given for  MRI on 2/15/23 @ [facility]   Per patient message his left arm (antecubital area of IV)  is doing fine and no problems

## 2023-02-27 ENCOUNTER — TELEPHONE (OUTPATIENT)
Dept: UROLOGY | Facility: HOSPITAL | Age: 53
End: 2023-02-27

## 2023-02-27 DIAGNOSIS — R97.20 ELEVATED PSA: Primary | ICD-10-CM

## 2023-02-27 NOTE — TELEPHONE ENCOUNTER
Reviewed MRI with patient over the phone  PIRADS-2 score  Patient should follow up in June-July 2023 with [facility] with next PSA to monitor stability  He is in agreement of the plan  PSA order is in the chart   Thank you

## 2023-03-01 DIAGNOSIS — F43.22 ADJUSTMENT DISORDER WITH ANXIETY: ICD-10-CM

## 2023-03-02 RX ORDER — BUSPIRONE HYDROCHLORIDE 15 MG/1
TABLET ORAL
Qty: 60 TABLET | Refills: 2 | Status: SHIPPED | OUTPATIENT
Start: 2023-03-02

## 2023-03-14 DIAGNOSIS — M54.50 LEFT LUMBAR PAIN: ICD-10-CM

## 2023-03-14 RX ORDER — CYCLOBENZAPRINE HCL 10 MG
10 TABLET ORAL
Qty: 30 TABLET | Refills: 0 | Status: SHIPPED | OUTPATIENT
Start: 2023-03-14

## 2023-03-30 DIAGNOSIS — E78.2 MIXED HYPERLIPIDEMIA: ICD-10-CM

## 2023-03-30 RX ORDER — ROSUVASTATIN CALCIUM 10 MG/1
TABLET, COATED ORAL
Qty: 90 TABLET | Refills: 4 | Status: SHIPPED | OUTPATIENT
Start: 2023-03-30

## 2023-04-03 DIAGNOSIS — I10 BENIGN ESSENTIAL HTN: ICD-10-CM

## 2023-04-03 RX ORDER — LOSARTAN POTASSIUM 50 MG/1
50 TABLET ORAL DAILY
Qty: 30 TABLET | Refills: 0 | Status: SHIPPED | OUTPATIENT
Start: 2023-04-03

## 2023-05-02 DIAGNOSIS — E03.9 HYPOTHYROIDISM, UNSPECIFIED TYPE: ICD-10-CM

## 2023-05-02 DIAGNOSIS — I10 BENIGN ESSENTIAL HTN: ICD-10-CM

## 2023-05-02 RX ORDER — LEVOTHYROXINE SODIUM 0.05 MG/1
TABLET ORAL
Qty: 30 TABLET | Refills: 2 | Status: SHIPPED | OUTPATIENT
Start: 2023-05-02

## 2023-05-02 RX ORDER — LOSARTAN POTASSIUM 50 MG/1
TABLET ORAL
Qty: 30 TABLET | Refills: 0 | Status: SHIPPED | OUTPATIENT
Start: 2023-05-02

## 2023-05-05 LAB
25(OH)D3+25(OH)D2 SERPL-MCNC: 31 NG/ML (ref 30–100)
CHOLEST SERPL-MCNC: 144 MG/DL (ref 100–199)
CHOLEST/HDLC SERPL: 3.8 RATIO (ref 0–5)
EST. AVERAGE GLUCOSE BLD GHB EST-MCNC: 128 MG/DL
HBA1C MFR BLD: 6.1 % (ref 4.8–5.6)
HDLC SERPL-MCNC: 38 MG/DL
LDLC SERPL CALC-MCNC: 55 MG/DL (ref 0–99)
LDLC SERPL DIRECT ASSAY-MCNC: 65 MG/DL (ref 0–99)
SL AMB VLDL CHOLESTEROL CALC: 51 MG/DL (ref 5–40)
TRIGL SERPL-MCNC: 332 MG/DL (ref 0–149)

## 2023-05-09 ENCOUNTER — OFFICE VISIT (OUTPATIENT)
Dept: FAMILY MEDICINE CLINIC | Facility: CLINIC | Age: 53
End: 2023-05-09

## 2023-05-09 VITALS
HEART RATE: 92 BPM | RESPIRATION RATE: 17 BRPM | DIASTOLIC BLOOD PRESSURE: 88 MMHG | BODY MASS INDEX: 35.95 KG/M2 | SYSTOLIC BLOOD PRESSURE: 130 MMHG | HEIGHT: 71 IN | WEIGHT: 256.8 LBS | OXYGEN SATURATION: 98 %

## 2023-05-09 DIAGNOSIS — R73.03 PREDIABETES: ICD-10-CM

## 2023-05-09 DIAGNOSIS — Z76.89 ENCOUNTER TO ESTABLISH CARE: ICD-10-CM

## 2023-05-09 DIAGNOSIS — R63.5 WEIGHT GAIN: ICD-10-CM

## 2023-05-09 DIAGNOSIS — G47.30 SLEEP APNEA, UNSPECIFIED TYPE: ICD-10-CM

## 2023-05-09 DIAGNOSIS — K21.01 GASTROESOPHAGEAL REFLUX DISEASE WITH ESOPHAGITIS AND HEMORRHAGE: ICD-10-CM

## 2023-05-09 DIAGNOSIS — E78.2 MIXED HYPERLIPIDEMIA: ICD-10-CM

## 2023-05-09 DIAGNOSIS — I10 ESSENTIAL HYPERTENSION: ICD-10-CM

## 2023-05-09 DIAGNOSIS — F43.22 ADJUSTMENT DISORDER WITH ANXIETY: ICD-10-CM

## 2023-05-09 DIAGNOSIS — E03.8 OTHER SPECIFIED HYPOTHYROIDISM: ICD-10-CM

## 2023-05-09 DIAGNOSIS — Z00.00 ANNUAL PHYSICAL EXAM: Primary | ICD-10-CM

## 2023-05-09 RX ORDER — ROSUVASTATIN CALCIUM 20 MG/1
20 TABLET, COATED ORAL DAILY
Qty: 90 TABLET | Refills: 3 | Status: SHIPPED | OUTPATIENT
Start: 2023-05-09

## 2023-05-09 NOTE — PROGRESS NOTES
ADULT ANNUAL PHYSICAL  [facility]    NAME: Suze Yen  AGE: 47 y o  SEX: male  : 1970     DATE: 2023     Assessment and Plan:  1  Immunizations UTD  Pt would like to defer Shingrix today  2  Labs UTD  3  Colonoscopy UTD  4  PSA monitored through Urology  5  F/u in 6 months for recheck or sooner PRN  Problem List Items Addressed This Visit        Digestive    Gastroesophageal reflux disease with esophagitis and hemorrhage       Endocrine    Other specified hypothyroidism       Respiratory    Sleep apnea       Cardiovascular and Mediastinum    Essential hypertension       Other    Adjustment disorder with anxiety    Hyperlipidemia    Relevant Medications    rosuvastatin (CRESTOR) 20 MG tablet    Other Relevant Orders    Lipid panel    Comprehensive metabolic panel    Prediabetes    Relevant Orders    Hemoglobin A1C   Other Visit Diagnoses     Annual physical exam    -  Primary    Weight gain        Relevant Orders    TSH, 3rd generation with Free T4 reflex    Encounter to establish care        Relevant Orders    Ambulatory Referral to Dermatology          Immunizations and preventive care screenings were discussed with patient today  Appropriate education was printed on patient's after visit summary  Discussed risks and benefits of prostate cancer screening  We discussed the controversial history of PSA screening for prostate cancer in the [place] as well as the risk of over detection and over treatment of prostate cancer by way of PSA screening  The patient understands that PSA blood testing is an imperfect way to screen for prostate cancer and that elevated PSA levels in the blood may also be caused by infection, inflammation, prostatic trauma or manipulation, urological procedures, or by benign prostatic enlargement      The role of the digital rectal examination in prostate cancer screening was also discussed and I discussed with him that there is large interobserver variability in the findings of digital rectal examination  Counseling:  Alcohol/drug use: discussed moderation in alcohol intake, the recommendations for healthy alcohol use, and avoidance of illicit drug use  Dental Health: discussed importance of regular tooth brushing, flossing, and dental visits  Injury prevention: discussed safety/seat belts, safety helmets, smoke detectors, carbon dioxide detectors, and smoking near bedding or upholstery  Sexual health: discussed sexually transmitted diseases, partner selection, use of condoms, avoidance of unintended pregnancy, and contraceptive alternatives  · Exercise: the importance of regular exercise/physical activity was discussed  Recommend exercise 3-5 times per week for at least 30 minutes  BMI Counseling: Body mass index is 35 82 kg/m²  The BMI is above normal  Nutrition recommendations include decreasing portion sizes, encouraging healthy choices of fruits and vegetables, decreasing fast food intake, consuming healthier snacks, limiting drinks that contain sugar, moderation in carbohydrate intake, increasing intake of lean protein, reducing intake of saturated and trans fat and reducing intake of cholesterol  Exercise recommendations include moderate physical activity 150 minutes/week  No pharmacotherapy was ordered  Rationale for BMI follow-up plan is due to patient being overweight or obese  Depression Screening and Follow-up Plan: Patient was screened for depression during today's encounter  They screened negative with a PHQ-2 score of 2  Return in about 6 months (around 11/9/2023) for Recheck BP 6 months, EKG, shingrix #1       Chief Complaint:     Chief Complaint   Patient presents with   • Follow-up   • Fatigue   • Physical Exam      History of Present Illness:     Adult Annual Physical   Patient here for a comprehensive physical exam  The patient reports no problems  Diet and Physical Activity  · Diet/Nutrition: poor diet  · Exercise: no formal exercise  Depression Screening  PHQ-2/9 Depression Screening    Little interest or pleasure in doing things: 1 - several days  Feeling down, depressed, or hopeless: 1 - several days  PHQ-2 Score: 2  PHQ-2 Interpretation: Negative depression screen       General Health  · Sleep: sleeps poorly and gets 4-6 hours of sleep on average  · Hearing: normal - bilateral   · Vision: no vision problems, most recent eye exam <1 year ago and wears glasses  · Dental: regular dental visits, brushes teeth twice daily and flosses teeth occasionally   Health  · Symptoms include: none     Review of Systems:     Review of Systems   Constitutional: Negative  HENT: Negative  Respiratory: Negative  Negative for cough  Cardiovascular: Negative  Gastrointestinal: Negative  Genitourinary: Negative  Musculoskeletal: Negative  Negative for myalgias  Neurological: Negative  Psychiatric/Behavioral: Negative         Past Medical History:     Past Medical History:   Diagnosis Date   • Acute sinusitis 2011   • Anxiety    • Cellulitis 2011   • Cellulitis of neck 2010   • Colon polyp    • Facial cellulitis 2010   • Hypercholesterolemia    • Lymph nodes enlarged    • Sensory disturbance    • Sleep apnea       Past Surgical History:     Past Surgical History:   Procedure Laterality Date   • COLONOSCOPY        Family History:     Family History   Problem Relation Age of Onset   • Hypertension Mother    • Hyperlipidemia Mother    • Coronary artery disease Father    • Heart attack Brother         B/D 52 MI   • Leukemia Brother    • Leukemia Son    • Mental illness Neg Hx    • Substance Abuse Neg Hx    • Alcohol abuse Neg Hx    • Colon polyps Neg Hx    • Colon cancer Neg Hx       Social History:     Social History     Socioeconomic History   • Marital status: /Civil Union     Spouse name: None   • Number of children: None   • Years of education: None   • Highest education level: None   Occupational History   • None   Tobacco Use   • Smoking status: Never   • Smokeless tobacco: Never   • Tobacco comments:     DENIED: HISTORY OF TOBACCO USE   Vaping Use   • Vaping Use: Never used   Substance and Sexual Activity   • Alcohol use: Yes     Alcohol/week: 2 0 standard drinks     Types: 2 Cans of beer per week     Comment: SOCIAL; 2 BEERS A DAY AS PER ALL SCRIPTS    • Drug use: No   • Sexual activity: None   Other Topics Concern   • None   Social History Narrative    Always uses seat belt    Caffeine use: 4 cups of coffee qd    Has smoke detectors     Social Determinants of Health     Financial Resource Strain: Not on file   Food Insecurity: Not on file   Transportation Needs: Not on file   Physical Activity: Not on file   Stress: Not on file   Social Connections: Not on file   Intimate Partner Violence: Not on file   Housing Stability: Not on file      Current Medications:     Current Outpatient Medications   Medication Sig Dispense Refill   • albuterol (ProAir HFA) 90 mcg/act inhaler Inhale 2 puffs every 6 (six) hours as needed for wheezing 8 5 g 0   • busPIRone (BUSPAR) 15 mg tablet TAKE 1 TABLET BY MOUTH TWICE A DAY 60 tablet 2   • cyclobenzaprine (FLEXERIL) 10 mg tablet TAKE 1 TABLET (10 MG TOTAL) BY MOUTH DAILY AT BEDTIME AS NEEDED FOR MUSCLE SPASMS 30 tablet 0   • famotidine (PEPCID) 20 mg tablet TAKE 1 TABLET BY MOUTH EVERY DAY 30 tablet 1   • levothyroxine 50 mcg tablet TAKE 1 TABLET BY MOUTH EVERY DAY 30 tablet 2   • losartan (COZAAR) 50 mg tablet TAKE 1 TABLET BY MOUTH DAILY 30 tablet 0   • pantoprazole (PROTONIX) 40 mg tablet TAKE 1 TABLET BY MOUTH EVERY DAY 30 tablet 12   • rosuvastatin (CRESTOR) 20 MG tablet Take 1 tablet (20 mg total) by mouth daily 90 tablet 3   • tadalafil (CIALIS) 5 MG tablet TAKE 1 TABLET BY MOUTH EVERY DAY 30 tablet 11     No current facility-administered medications for this visit        Allergies: "    Allergies   Allergen Reactions   • Ace Inhibitors Cough   • Penicillins Other (See Comments)     Other reaction(s): unkown, childhood rxn      Physical Exam:     /88   Pulse 92   Resp 17   Ht 5' 11\" (1 803 m)   Wt 116 kg (256 lb 12 8 oz)   SpO2 98%   BMI 35 82 kg/m²     Physical Exam  Vitals and nursing note reviewed  Constitutional:       General: He is not in acute distress  Appearance: Normal appearance  He is well-developed  He is obese  HENT:      Head: Normocephalic and atraumatic  Right Ear: Tympanic membrane, ear canal and external ear normal       Left Ear: Tympanic membrane, ear canal and external ear normal    Eyes:      Conjunctiva/sclera: Conjunctivae normal    Cardiovascular:      Rate and Rhythm: Normal rate and regular rhythm  Pulses: Normal pulses  Heart sounds: Normal heart sounds  No murmur heard  Pulmonary:      Effort: Pulmonary effort is normal  No respiratory distress  Breath sounds: Normal breath sounds  Abdominal:      General: There is no distension  Palpations: Abdomen is soft  There is no mass  Tenderness: There is no abdominal tenderness  There is no guarding or rebound  Hernia: No hernia is present  Musculoskeletal:         General: No swelling  Normal range of motion  Cervical back: Neck supple  Right lower leg: No edema  Left lower leg: No edema  Skin:     General: Skin is warm and dry  Capillary Refill: Capillary refill takes less than 2 seconds  Neurological:      Mental Status: He is alert and oriented to person, place, and time  Mental status is at baseline  Psychiatric:         Mood and Affect: Mood normal          Behavior: Behavior normal          Thought Content:  Thought content normal          Judgment: Judgment normal           CHICHI Mcdonald  [address]  "

## 2023-05-09 NOTE — PROGRESS NOTES
Assessment/Plan:     Diagnoses and all orders for this visit:    Essential hypertension    BP managed w/ Losartan  Continue same  Pt declining EKG and would like to do this at next OV  Weight gain  -     TSH, 3rd generation with Free T4 reflex; Future  -     TSH, 3rd generation with Free T4 reflex    Check TSH  Pt encouraged to work hard on dietary modification and incorporating cardiovascular exercise in his daily routine  Mixed hyperlipidemia  -     Lipid panel; Future  -     Comprehensive metabolic panel; Future  -     Lipid panel  -     Comprehensive metabolic panel  -     rosuvastatin (CRESTOR) 20 MG tablet; Take 1 tablet (20 mg total) by mouth daily    TC: 144, T, HDL:38, LDL:55  Increase Crestor to 20 mg to hopefully help better control TG  Pt also encouraged to cut back on his red meat intake which he notes is several times per week  Recheck lipid panel and CMP in 3 months  Patient is encouraged to call our office for any questions/concerns, persistent or worsening symptoms  Patient states they understand and agree with treatment plan  Prediabetes  -     Hemoglobin A1C; Future  -     Hemoglobin A1C    A1c at 6 1%  Pt encouraged to work hard on diet and exercise  Recheck A1c in 3 months  Encounter to establish care  -     Ambulatory Referral to Dermatology; Future    Sleep apnea, unspecified type    Pt not managed on CPAP  He is not interested in any further evaluation  Other specified hypothyroidism    Pt managed w/ Levothyroxine daily  Continue same  Gastroesophageal reflux disease with esophagitis and hemorrhage    Managed w/ Pepcid daily  Continue same  Adjustment disorder with anxiety      Managed w/ Buspar  Continue same  Pt to f/u in 6 months for recheck w/ EKG or sooner PRN  Lipid panel and CMP due in 3 months  Subjective:      Patient ID: Maureen Branch is a 47 y o  male  Pt here today for his annual physical   Overall he is feeling well    He does feel "like he has gained some weight  (6 lbs since visit in October)  He notes he has not changed his eating habits  Pt admits that he does not exercise  Additionally, pt notes he has some abdominal bloating after meals but admits this has been ongoing for years  It does not seem to matter what he has to eat or when, he always feels bloated  Pt denies fever, nausea, vomiting, change in bowel/bladder  The following portions of the patient's history were reviewed and updated as appropriate: allergies, current medications, past family history, past medical history, past social history, past surgical history and problem list     Review of Systems    As noted per HPI  Objective:      /88   Pulse 92   Resp 17   Ht 5' 11\" (1 803 m)   Wt 116 kg (256 lb 12 8 oz)   SpO2 98%   BMI 35 82 kg/m²          Physical Exam  Vitals reviewed  Constitutional:       General: He is not in acute distress  Appearance: Normal appearance  He is not ill-appearing  HENT:      Right Ear: Tympanic membrane, ear canal and external ear normal       Left Ear: Tympanic membrane, ear canal and external ear normal    Cardiovascular:      Rate and Rhythm: Normal rate and regular rhythm  Pulses: Normal pulses  Heart sounds: Normal heart sounds  Pulmonary:      Effort: Pulmonary effort is normal  No respiratory distress  Breath sounds: Normal breath sounds  No wheezing  Abdominal:      Palpations: Abdomen is soft  Musculoskeletal:         General: Normal range of motion  Skin:     General: Skin is warm  Capillary Refill: Capillary refill takes less than 2 seconds  Neurological:      General: No focal deficit present  Mental Status: He is alert and oriented to person, place, and time  Psychiatric:         Mood and Affect: Mood normal          Behavior: Behavior normal          Thought Content:  Thought content normal          Judgment: Judgment normal          "

## 2023-05-25 ENCOUNTER — OFFICE VISIT (OUTPATIENT)
Dept: FAMILY MEDICINE CLINIC | Facility: CLINIC | Age: 53
End: 2023-05-25

## 2023-05-25 VITALS
DIASTOLIC BLOOD PRESSURE: 90 MMHG | BODY MASS INDEX: 35.31 KG/M2 | WEIGHT: 252.2 LBS | TEMPERATURE: 99 F | OXYGEN SATURATION: 98 % | HEART RATE: 93 BPM | SYSTOLIC BLOOD PRESSURE: 128 MMHG | HEIGHT: 71 IN

## 2023-05-25 DIAGNOSIS — R05.1 ACUTE COUGH: ICD-10-CM

## 2023-05-25 DIAGNOSIS — R09.81 SINUS CONGESTION: Primary | ICD-10-CM

## 2023-05-25 RX ORDER — PREDNISONE 10 MG/1
TABLET ORAL
Qty: 20 TABLET | Refills: 0 | Status: SHIPPED | OUTPATIENT
Start: 2023-05-25

## 2023-05-25 RX ORDER — AZITHROMYCIN 250 MG/1
TABLET, FILM COATED ORAL
Qty: 6 TABLET | Refills: 0 | Status: SHIPPED | OUTPATIENT
Start: 2023-05-25 | End: 2023-05-30

## 2023-05-25 RX ORDER — HYDROCODONE POLISTIREX AND CHLORPHENIRAMINE POLISTIREX 10; 8 MG/5ML; MG/5ML
5 SUSPENSION, EXTENDED RELEASE ORAL EVERY 12 HOURS PRN
Qty: 115 ML | Refills: 0 | Status: SHIPPED | OUTPATIENT
Start: 2023-05-25

## 2023-05-25 NOTE — PROGRESS NOTES
"Assessment/Plan:     Diagnoses and all orders for this visit:    Sinus congestion  -     predniSONE 10 mg tablet; Take 4 tablets with food on days 1 & 2  Then take 3 tablets with food on days 3 &4  Then take 2 tablets with food on days 5 & 6  Then take 1 tablet with food on days 7 & 8  Pt to continue his Zyrtec, Flonase  Prednisone taper added  Medication and s/e reviewed  Rest and fluids encouraged  Patient is encouraged to call our office for any questions/concerns, persistent or worsening symptoms  Patient states they understand and agree with treatment plan  Pt to f/u PRN  Subjective:      Patient ID: Ifeanyi Carroll is a 47 y o  male  Pt presents today after noting on Tuesday he started with a cough  He was having a productive cough yesterday  He believes he may have had a fever last night  Also reports sinus pain and nasal congestion  Denies chills, body aches, sore throat, ear pain/pressure  He has been taking Zyrtec daily along w/ Mucinex Robitussin  The following portions of the patient's history were reviewed and updated as appropriate: allergies, current medications, past family history, past medical history, past social history, past surgical history and problem list     Review of Systems    As noted per HPI  Objective:      /90   Pulse 93   Temp 99 °F (37 2 °C) (Oral)   Ht 5' 11\" (1 803 m)   Wt 114 kg (252 lb 3 2 oz)   SpO2 98%   BMI 35 17 kg/m²          Physical Exam  Constitutional:       Appearance: Normal appearance  HENT:      Right Ear: Tympanic membrane, ear canal and external ear normal       Left Ear: Tympanic membrane, ear canal and external ear normal       Nose: Congestion present  Mouth/Throat:      Pharynx: No oropharyngeal exudate or posterior oropharyngeal erythema  Cardiovascular:      Rate and Rhythm: Normal rate and regular rhythm  Pulses: Normal pulses  Heart sounds: Normal heart sounds     Pulmonary:      Effort: " Pulmonary effort is normal       Breath sounds: Normal breath sounds  Lymphadenopathy:      Cervical: No cervical adenopathy  Neurological:      Mental Status: He is alert and oriented to person, place, and time  Mental status is at baseline  Psychiatric:         Mood and Affect: Mood normal          Behavior: Behavior normal          Thought Content:  Thought content normal          Judgment: Judgment normal

## 2023-06-05 DIAGNOSIS — F43.22 ADJUSTMENT DISORDER WITH ANXIETY: ICD-10-CM

## 2023-06-05 RX ORDER — BUSPIRONE HYDROCHLORIDE 15 MG/1
TABLET ORAL
Qty: 60 TABLET | Refills: 2 | Status: SHIPPED | OUTPATIENT
Start: 2023-06-05

## 2023-06-22 ENCOUNTER — TELEPHONE (OUTPATIENT)
Dept: UROLOGY | Facility: AMBULATORY SURGERY CENTER | Age: 53
End: 2023-06-22

## 2023-08-06 DIAGNOSIS — E03.9 HYPOTHYROIDISM, UNSPECIFIED TYPE: ICD-10-CM

## 2023-08-06 RX ORDER — LEVOTHYROXINE SODIUM 0.05 MG/1
TABLET ORAL
Qty: 30 TABLET | Refills: 2 | Status: SHIPPED | OUTPATIENT
Start: 2023-08-06

## 2023-08-10 DIAGNOSIS — I10 BENIGN ESSENTIAL HTN: ICD-10-CM

## 2023-08-10 RX ORDER — LOSARTAN POTASSIUM 50 MG/1
TABLET ORAL
Qty: 30 TABLET | Refills: 1 | Status: SHIPPED | OUTPATIENT
Start: 2023-08-10

## 2023-09-06 DIAGNOSIS — F43.22 ADJUSTMENT DISORDER WITH ANXIETY: ICD-10-CM

## 2023-09-06 RX ORDER — BUSPIRONE HYDROCHLORIDE 15 MG/1
TABLET ORAL
Qty: 60 TABLET | Refills: 2 | Status: SHIPPED | OUTPATIENT
Start: 2023-09-06

## 2023-10-11 ENCOUNTER — OFFICE VISIT (OUTPATIENT)
Dept: FAMILY MEDICINE CLINIC | Facility: CLINIC | Age: 53
End: 2023-10-11
Payer: COMMERCIAL

## 2023-10-11 ENCOUNTER — APPOINTMENT (OUTPATIENT)
Dept: RADIOLOGY | Facility: CLINIC | Age: 53
End: 2023-10-11
Payer: COMMERCIAL

## 2023-10-11 VITALS
BODY MASS INDEX: 36.26 KG/M2 | HEIGHT: 71 IN | RESPIRATION RATE: 17 BRPM | WEIGHT: 259 LBS | SYSTOLIC BLOOD PRESSURE: 120 MMHG | HEART RATE: 87 BPM | DIASTOLIC BLOOD PRESSURE: 82 MMHG | OXYGEN SATURATION: 97 % | TEMPERATURE: 97.1 F

## 2023-10-11 DIAGNOSIS — R05.3 CHRONIC COUGH: ICD-10-CM

## 2023-10-11 DIAGNOSIS — I10 BENIGN ESSENTIAL HTN: ICD-10-CM

## 2023-10-11 DIAGNOSIS — M62.08 DIASTASIS RECTI: Primary | ICD-10-CM

## 2023-10-11 PROCEDURE — 71046 X-RAY EXAM CHEST 2 VIEWS: CPT

## 2023-10-11 PROCEDURE — 99213 OFFICE O/P EST LOW 20 MIN: CPT | Performed by: NURSE PRACTITIONER

## 2023-10-11 RX ORDER — LOSARTAN POTASSIUM 50 MG/1
TABLET ORAL
Qty: 30 TABLET | Refills: 1 | Status: SHIPPED | OUTPATIENT
Start: 2023-10-11

## 2023-10-11 NOTE — PROGRESS NOTES
Assessment/Plan:     Diagnoses and all orders for this visit:    Diastasis recti  -     Ambulatory Referral to General Surgery; Future    Reviewed with patient that often this is not something corrected w/ surgery and he will need to try and avoid heavy lifting. We will refer to General surgery for additional guidance, however if he is not having pain/problems, any surgical procedure may be deemed cosmetic. Patient is encouraged to call our office for any questions/concerns, persistent or worsening symptoms. Patient states they understand and agree with treatment plan. Chronic cough  -     XR chest pa & lateral; Future  -     Ambulatory Referral to Allergy; Future      Check CXR. Pt to make appointment w/ GI as well as Allergy as he is already established for further evaluation. If CXR is negative, consider CT scan of chest and PFT testing. Patient is encouraged to call our office for any questions/concerns, persistent or worsening symptoms. Patient states they understand and agree with treatment plan. Pt to f/u PRN. F/u pending results. Subjective:      Patient ID: Bethany Higgins is a 47 y.o. male. Pt presents today w/ his wife for concerns over a bulge to his mid abdomen. Pt notes that the area is not painful, however he notes it when lying down. Pt denies any changes in his bowels. Pt does not smoke. He knows he needs to lose weight. Additionally, pt notes a chronic cough for years. He has had cxrs in the past that have been negative. Pt previously was seen at GI and had EGD and was on Protonix. Additionally, he was previously following w/ allergist in [facility] but has not been seen in years. He admits the cough is worse sometimes after cutting grass.       The following portions of the patient's history were reviewed and updated as appropriate: allergies, current medications, past family history, past medical history, past social history, past surgical history, and problem list.    Review of Systems    As noted per HPI. Objective:      /82   Pulse 87   Temp (!) 97.1 °F (36.2 °C) (Oral)   Resp 17   Ht 5' 11" (1.803 m)   Wt 117 kg (259 lb)   SpO2 97%   BMI 36.12 kg/m²          Physical Exam  Vitals reviewed. Constitutional:       Appearance: Normal appearance. Cardiovascular:      Pulses: Normal pulses. Heart sounds: Normal heart sounds. Pulmonary:      Effort: Pulmonary effort is normal.      Breath sounds: Normal breath sounds. Neurological:      Mental Status: He is alert and oriented to person, place, and time. Mental status is at baseline. Psychiatric:         Mood and Affect: Mood normal.         Behavior: Behavior normal.         Thought Content:  Thought content normal.         Judgment: Judgment normal.

## 2023-10-16 DIAGNOSIS — R93.89 ABNORMAL CXR: ICD-10-CM

## 2023-10-16 DIAGNOSIS — R05.3 CHRONIC COUGH: Primary | ICD-10-CM

## 2023-10-20 ENCOUNTER — HOSPITAL ENCOUNTER (OUTPATIENT)
Dept: CT IMAGING | Facility: HOSPITAL | Age: 53
Discharge: HOME | End: 2023-10-20
Payer: COMMERCIAL

## 2023-10-20 DIAGNOSIS — R93.89 ABNORMAL CXR: ICD-10-CM

## 2023-10-20 DIAGNOSIS — R05.3 CHRONIC COUGH: ICD-10-CM

## 2023-10-20 PROCEDURE — 71250 CT THORAX DX C-: CPT

## 2023-10-20 PROCEDURE — G1004 CDSM NDSC: HCPCS

## 2023-10-23 ENCOUNTER — CONSULT (OUTPATIENT)
Dept: SURGERY | Facility: HOSPITAL | Age: 53
End: 2023-10-23
Payer: COMMERCIAL

## 2023-10-23 VITALS
RESPIRATION RATE: 16 BRPM | WEIGHT: 262.2 LBS | TEMPERATURE: 96.6 F | HEART RATE: 87 BPM | HEIGHT: 71 IN | DIASTOLIC BLOOD PRESSURE: 85 MMHG | SYSTOLIC BLOOD PRESSURE: 143 MMHG | BODY MASS INDEX: 36.71 KG/M2

## 2023-10-23 DIAGNOSIS — M62.08 DIASTASIS RECTI: ICD-10-CM

## 2023-10-23 DIAGNOSIS — E66.9 OBESITY: Primary | ICD-10-CM

## 2023-10-23 PROCEDURE — 99203 OFFICE O/P NEW LOW 30 MIN: CPT | Performed by: SURGERY

## 2023-10-25 ENCOUNTER — HOSPITAL ENCOUNTER (OUTPATIENT)
Dept: PULMONOLOGY | Facility: HOSPITAL | Age: 53
Discharge: HOME | End: 2023-10-25
Payer: COMMERCIAL

## 2023-10-25 DIAGNOSIS — R05.3 CHRONIC COUGH: ICD-10-CM

## 2023-10-25 PROCEDURE — 94060 EVALUATION OF WHEEZING: CPT

## 2023-10-25 PROCEDURE — 94760 N-INVAS EAR/PLS OXIMETRY 1: CPT

## 2023-10-25 PROCEDURE — 94060 EVALUATION OF WHEEZING: CPT | Performed by: INTERNAL MEDICINE

## 2023-10-25 PROCEDURE — 94726 PLETHYSMOGRAPHY LUNG VOLUMES: CPT | Performed by: INTERNAL MEDICINE

## 2023-10-25 PROCEDURE — 94729 DIFFUSING CAPACITY: CPT

## 2023-10-25 PROCEDURE — 94726 PLETHYSMOGRAPHY LUNG VOLUMES: CPT

## 2023-10-25 PROCEDURE — 94729 DIFFUSING CAPACITY: CPT | Performed by: INTERNAL MEDICINE

## 2023-10-25 RX ORDER — ALBUTEROL SULFATE 2.5 MG/3ML
2.5 SOLUTION RESPIRATORY (INHALATION) ONCE
Status: COMPLETED | OUTPATIENT
Start: 2023-10-25 | End: 2023-10-25

## 2023-10-25 RX ADMIN — ALBUTEROL SULFATE 2.5 MG: 2.5 SOLUTION RESPIRATORY (INHALATION) at 09:28

## 2023-11-02 ENCOUNTER — TELEPHONE (OUTPATIENT)
Dept: FAMILY MEDICINE CLINIC | Facility: CLINIC | Age: 53
End: 2023-11-02

## 2023-11-02 DIAGNOSIS — R05.3 CHRONIC COUGH: Primary | ICD-10-CM

## 2023-11-02 NOTE — TELEPHONE ENCOUNTER
----- Message from [employer], [facility] sent at 11/2/2023  1:14 PM EDT -----  Please let patient know that his pulmonary function testing was relatively normal. The pulmonologist commented that there could possibly a vocal cord component related to his cough. I would recommend he see an ENT provider and referred him to Dr. Taqueria Caro. Please give him this info and let me know if he needs anything. Thanks!

## 2023-11-02 NOTE — TELEPHONE ENCOUNTER
I called and s/w the pt, he is aware of his results. I told him that you are referring him to an ENT and gave him Dr Beata Luque information. He said that he will call and make an appt.

## 2023-11-02 NOTE — TELEPHONE ENCOUNTER
Wife left vm on RX line   [facility] called for insurance referral for Antonette Cordova. He is going to [facility] in [place]. [facility] on 11/28/2023.   NPI [id number]  Dx: Z76.89 Establish care   Insurance: [facility] [employer]   Referral placed 05/09/2023

## 2023-11-04 DIAGNOSIS — E03.9 HYPOTHYROIDISM, UNSPECIFIED TYPE: ICD-10-CM

## 2023-11-04 RX ORDER — LEVOTHYROXINE SODIUM 0.05 MG/1
TABLET ORAL
Qty: 30 TABLET | Refills: 0 | Status: SHIPPED | OUTPATIENT
Start: 2023-11-04

## 2023-11-10 NOTE — PROGRESS NOTES
Assessment/Plan:   Libby Luis is a 47 y.o.male who is here for Abdominal Bulge (New patient referred by Andres Bhatt, for evaluation of abdominal bulging of upper abdominal area. To r/o hernia verses recti diastasis. No pain. No prior hernia surgeries.)  . Plan: Rectus diastasis/Obesity - discussed that this this is not hernia, no need for surgical repair as it would be cosmetic, discussed obesity weight loss diet exercise although this will not improve the diastasis     Preoperative Clearance: None    HPI:  Libby Luis is a 47 y.o.male who was referred for evaluation of Abdominal Bulge (New patient referred by Andres Bhatt, for evaluation of abdominal bulging of upper abdominal area. To r/o hernia verses recti diastasis. No pain. No prior hernia surgeries.)  . Currently abd bulge.      ROS:  General ROS: negative  negative for - chills, fatigue, fever or night sweats, weight loss  Respiratory ROS: no cough, shortness of breath, or wheezing  Cardiovascular ROS: no chest pain or dyspnea on exertion  Genito-Urinary ROS: no dysuria, trouble voiding, or hematuria  Musculoskeletal ROS: negative for - gait disturbance, joint pain or muscle pain  Neurological ROS: no TIA or stroke symptoms  Abd bulge    [unfilled]  Ace inhibitors and Penicillins    Current Outpatient Medications:     albuterol (ProAir HFA) 90 mcg/act inhaler, Inhale 2 puffs every 6 (six) hours as needed for wheezing, Disp: 8.5 g, Rfl: 0    busPIRone (BUSPAR) 15 mg tablet, TAKE 1 TABLET BY MOUTH TWICE A DAY, Disp: 60 tablet, Rfl: 2    cyclobenzaprine (FLEXERIL) 10 mg tablet, TAKE 1 TABLET (10 MG TOTAL) BY MOUTH DAILY AT BEDTIME AS NEEDED FOR MUSCLE SPASMS, Disp: 30 tablet, Rfl: 0    famotidine (PEPCID) 20 mg tablet, TAKE 1 TABLET BY MOUTH EVERY DAY, Disp: 30 tablet, Rfl: 1    losartan (COZAAR) 50 mg tablet, TAKE ONE TABLET BY MOUTH EVERY DAY, Disp: 30 tablet, Rfl: 1    pantoprazole (PROTONIX) 40 mg tablet, TAKE 1 TABLET BY MOUTH EVERY DAY, Disp: 30 tablet, Rfl: 12    rosuvastatin (CRESTOR) 20 MG tablet, Take 1 tablet (20 mg total) by mouth daily, Disp: 90 tablet, Rfl: 3    tadalafil (CIALIS) 5 MG tablet, TAKE 1 TABLET BY MOUTH EVERY DAY, Disp: 30 tablet, Rfl: 11    levothyroxine 50 mcg tablet, TAKE 1 TABLET BY MOUTH EVERY DAY, Disp: 30 tablet, Rfl: 0  Past Medical History:   Diagnosis Date    Acute sinusitis 2011    Anxiety     Cellulitis 2011    Cellulitis of neck 2010    Colon polyp     Facial cellulitis 2010    Hypercholesterolemia     Lymph nodes enlarged     Sensory disturbance     Sleep apnea      Past Surgical History:   Procedure Laterality Date    COLONOSCOPY       Family History   Problem Relation Age of Onset    Hypertension Mother     Hyperlipidemia Mother     Coronary artery disease Father     Heart attack Brother         B/D 52 MI    Leukemia Brother     Leukemia Son     Mental illness Neg Hx     Substance Abuse Neg Hx     Alcohol abuse Neg Hx     Colon polyps Neg Hx     Colon cancer Neg Hx       reports that he has never smoked. He has never used smokeless tobacco. He reports current alcohol use of about 2.0 standard drinks of alcohol per week. He reports that he does not use drugs. Labs:   Lab Results   Component Value Date    WBC 5.7 10/24/2022    WBC 5.3 09/21/2021    WBC 5.8 08/03/2020    WBC 4.9 07/27/2017    WBC 5.8 10/04/2016    HGB 15.1 10/24/2022    HGB 14.4 09/21/2021    HGB 14.4 08/03/2020    HGB 14.7 07/27/2017    HGB 14.5 10/04/2016     10/24/2022     09/21/2021     08/03/2020     07/27/2017     10/04/2016     Lab Results   Component Value Date    ALT 29 10/24/2022    ALT 33 09/21/2021    ALT 24 03/15/2021    AST 19 10/24/2022    AST 23 09/21/2021    AST 19 03/15/2021     This SmartLink has not been configured with any valid records.        PHYSICAL EXAM  General Appearance:    Alert, cooperative, no distress,    Head:    Normocephalic without obvious abnormality   Eyes: PERRL, conjunctiva/corneas clear, EOM's intact        Neck:   Supple, no adenopathy, no JVD   Back:     Symmetric, no spinal or CVA tenderness   Lungs:     Clear to auscultation bilaterally, no wheezing or rhonchi   Heart:    Regular rate and rhythm, S1 and S2 normal, no murmur   Abdomen:     Soft +BS ND NT + rectus diastasis central obesity   Extremities:   Extremities normal. No clubbing, cyanosis or edema   Psych:   Normal Affect, AOx3. Neurologic:  Skin:   CNII-XII intact. Strength symmetric, speech intact    Warm, dry, intact, no visible rashes or lesions         Physical Exam              Some portions of this record may have been generated with voice recognition software. There may be translation, syntax,  or grammatical errors. Occasional wrong word or "sound-a-like" substitutions may have occurred due to the inherent limitations of the voice recognition software. Read the chart carefully and recognize, using context, where substitutions may have occurred. If you have any questions, please contact the dictating provider for clarification or correction, as needed. This encounter has been coded by a non-certified coder.

## 2023-11-15 LAB
ALBUMIN SERPL-MCNC: 4.5 G/DL (ref 3.8–4.9)
ALBUMIN/GLOB SERPL: 1.9 {RATIO} (ref 1.2–2.2)
ALP SERPL-CCNC: 71 IU/L (ref 44–121)
ALT SERPL-CCNC: 71 IU/L (ref 0–44)
AST SERPL-CCNC: 48 IU/L (ref 0–40)
BILIRUB SERPL-MCNC: 1.1 MG/DL (ref 0–1.2)
BUN SERPL-MCNC: 16 MG/DL (ref 6–24)
BUN/CREAT SERPL: 16 (ref 9–20)
CALCIUM SERPL-MCNC: 9.6 MG/DL (ref 8.7–10.2)
CHLORIDE SERPL-SCNC: 103 MMOL/L (ref 96–106)
CHOLEST SERPL-MCNC: 128 MG/DL (ref 100–199)
CHOLEST/HDLC SERPL: 3 RATIO (ref 0–5)
CO2 SERPL-SCNC: 24 MMOL/L (ref 20–29)
CREAT SERPL-MCNC: 1.01 MG/DL (ref 0.76–1.27)
EGFR: 89 ML/MIN/1.73
EST. AVERAGE GLUCOSE BLD GHB EST-MCNC: 128 MG/DL
GLOBULIN SER-MCNC: 2.4 G/DL (ref 1.5–4.5)
GLUCOSE SERPL-MCNC: 113 MG/DL (ref 70–99)
HBA1C MFR BLD: 6.1 % (ref 4.8–5.6)
HDLC SERPL-MCNC: 43 MG/DL
LDLC SERPL CALC-MCNC: 44 MG/DL (ref 0–99)
LDLC SERPL DIRECT ASSAY-MCNC: 53 MG/DL (ref 0–99)
POTASSIUM SERPL-SCNC: 4.8 MMOL/L (ref 3.5–5.2)
PROT SERPL-MCNC: 6.9 G/DL (ref 6–8.5)
SL AMB VLDL CHOLESTEROL CALC: 41 MG/DL (ref 5–40)
SODIUM SERPL-SCNC: 140 MMOL/L (ref 134–144)
TRIGL SERPL-MCNC: 268 MG/DL (ref 0–149)

## 2023-11-21 ENCOUNTER — OFFICE VISIT (OUTPATIENT)
Dept: FAMILY MEDICINE CLINIC | Facility: CLINIC | Age: 53
End: 2023-11-21
Payer: COMMERCIAL

## 2023-11-21 VITALS
BODY MASS INDEX: 36.19 KG/M2 | RESPIRATION RATE: 17 BRPM | OXYGEN SATURATION: 96 % | TEMPERATURE: 96.4 F | SYSTOLIC BLOOD PRESSURE: 138 MMHG | HEART RATE: 91 BPM | WEIGHT: 258.5 LBS | DIASTOLIC BLOOD PRESSURE: 90 MMHG | HEIGHT: 71 IN

## 2023-11-21 DIAGNOSIS — E03.8 OTHER SPECIFIED HYPOTHYROIDISM: ICD-10-CM

## 2023-11-21 DIAGNOSIS — R79.89 ELEVATED LIVER FUNCTION TESTS: ICD-10-CM

## 2023-11-21 DIAGNOSIS — R73.03 PREDIABETES: ICD-10-CM

## 2023-11-21 DIAGNOSIS — E78.2 MIXED HYPERLIPIDEMIA: ICD-10-CM

## 2023-11-21 DIAGNOSIS — Z23 ENCOUNTER FOR IMMUNIZATION: ICD-10-CM

## 2023-11-21 DIAGNOSIS — F41.9 ANXIETY: ICD-10-CM

## 2023-11-21 DIAGNOSIS — I10 ESSENTIAL HYPERTENSION: Primary | ICD-10-CM

## 2023-11-21 DIAGNOSIS — K76.0 HEPATIC STEATOSIS: ICD-10-CM

## 2023-11-21 DIAGNOSIS — K21.01 GASTROESOPHAGEAL REFLUX DISEASE WITH ESOPHAGITIS AND HEMORRHAGE: ICD-10-CM

## 2023-11-21 DIAGNOSIS — R05.3 CHRONIC COUGH: ICD-10-CM

## 2023-11-21 PROCEDURE — 90471 IMMUNIZATION ADMIN: CPT

## 2023-11-21 PROCEDURE — 99214 OFFICE O/P EST MOD 30 MIN: CPT | Performed by: NURSE PRACTITIONER

## 2023-11-21 PROCEDURE — 90686 IIV4 VACC NO PRSV 0.5 ML IM: CPT

## 2023-11-21 NOTE — PROGRESS NOTES
Assessment/Plan:     Diagnoses and all orders for this visit:    Essential hypertension    BP slightly elevated today, however at previous OV was at goal. Pt to continue Losartan 50 mg daily. He is encouraged to periodically check BP at home and if majority of readings are >130/90 he will contact our office and we can increase his Losartan from 50 mg to 100 mg. Hopefully with him increasing exercise and working on his diet, his BP should return to baseline. Encounter for immunization  -     influenza vaccine, quadrivalent, 0.5 mL, preservative-free, for adult and pediatric patients 6 mos+ (AFLURIA, FLUARIX, FLULAVAL, FLUZONE)    Mixed hyperlipidemia  -     Lipid panel; Future    Cholesterol panel normal except for elevated TG. Pt will continue his Crestor. He will work very hard on diet and exercise. We will repeat LP in 3 months. If TG is still elevated or if it has not decreased, we will need to seriously consider adding Fenofibrate. Pt notes he is agreeable. Patient is encouraged to call our office for any questions/concerns, persistent or worsening symptoms. Patient states they understand and agree with treatment plan. Elevated liver function tests  -     Comprehensive metabolic panel; Future    Pt does have hx of severe fatty liver. He is going to work on diet and exercise. Repeat CMP in 3 months to assess LFTs. Gastroesophageal reflux disease with esophagitis and hemorrhage    Controlled w/ Pepcid and Pantoprazole. Continue same. Other specified hypothyroidism    TSH level ordered. Pt to continue Levothyroxine 50 mcg daily. Prediabetes    A1c consistent at 6.1. Continue good diet and exercise. Anxiety    Managed w/ Buspar BID. Continue same. Hepatic steatosis    Pt aware of fatty liver noted incidentally on CT of his chest. He admits he is going to work hard on diet and exercise to prevent disease progression.  Patient is encouraged to call our office for any questions/concerns, persistent or worsening symptoms. Patient states they understand and agree with treatment plan. Chronic cough      Pt has had CT chest, PFT testing, and CXR. PFT testing revealed possible vocal cord pathology. Pt was referred to ENT. He admits he has not yet made his appointment but will do so in the near future. Pt to RTO in 3 months w/ fasting labs prior or sooner PRN. Subjective:      Patient ID: Sofie Dotson is a 46 y.o. male. Pt here today for recheck of his chronic conditions including HTN, hyperlipidemia, prediabetes as well as his chronic cough. Pt recently had labs showing slight elevations in his LFTs, AST:48, ALT:71. He had a CT of his chest done for his chronic cough which incidentally revealed a severe fatty liver. Pt notes he has been trying to change his diet and eliminate red meats all together and focus on healthier eating. His PFT findings showed:  · Normal spirometry and lung volumes  · No change with bronchodilators  · Normal diffusion capacity  · Mild blunting of inspiratory limb of flow volume loop, suggestive of possible vocal cord pathology. Clinical correlation advised  Pt was referred to ENT for further evaluation. Additionally, Pt's cholesterol panel was relatively normal, with the exception of his TG level which was 268, although down from 332 in May 2023. Pt is on Crestor 20 mg and would like to see if he can lower his TG levels w/ diet and exercise changes before possibly adding another medication such as Fenofibrate. The following portions of the patient's history were reviewed and updated as appropriate: allergies, current medications, past family history, past medical history, past social history, past surgical history, and problem list.    Review of Systems    As noted per HPI.     Objective:      /90   Pulse 91   Temp (!) 96.4 °F (35.8 °C)   Resp 17   Ht 5' 11" (1.803 m)   Wt 117 kg (258 lb 8 oz)   SpO2 96%   BMI 36.05 kg/m² Physical Exam  Vitals reviewed. Constitutional:       Appearance: Normal appearance. Cardiovascular:      Pulses: Normal pulses. Heart sounds: Normal heart sounds. Pulmonary:      Effort: Pulmonary effort is normal.      Breath sounds: Normal breath sounds. Neurological:      Mental Status: He is alert and oriented to person, place, and time. Mental status is at baseline. Psychiatric:         Mood and Affect: Mood normal.         Behavior: Behavior normal.         Thought Content:  Thought content normal.         Judgment: Judgment normal.

## 2023-11-27 ENCOUNTER — TELEPHONE (OUTPATIENT)
Dept: FAMILY MEDICINE CLINIC | Facility: CLINIC | Age: 53
End: 2023-11-27

## 2023-11-27 DIAGNOSIS — R09.81 SINUS CONGESTION: Primary | ICD-10-CM

## 2023-11-27 RX ORDER — AZITHROMYCIN 250 MG/1
TABLET, FILM COATED ORAL
Qty: 6 TABLET | Refills: 0 | Status: SHIPPED | OUTPATIENT
Start: 2023-11-27 | End: 2023-12-02

## 2023-11-27 NOTE — TELEPHONE ENCOUNTER
Patient called and has a cough and a little sinus pressure since yesterday. He is traveling out of town via airplane and would like an antibiotic called in to the pharmacy, if possible?

## 2023-11-30 ENCOUNTER — TELEMEDICINE (OUTPATIENT)
Dept: FAMILY MEDICINE CLINIC | Facility: CLINIC | Age: 53
End: 2023-11-30
Payer: COMMERCIAL

## 2023-11-30 VITALS
SYSTOLIC BLOOD PRESSURE: 146 MMHG | HEIGHT: 72 IN | WEIGHT: 255 LBS | TEMPERATURE: 97.5 F | BODY MASS INDEX: 34.54 KG/M2 | DIASTOLIC BLOOD PRESSURE: 100 MMHG

## 2023-11-30 DIAGNOSIS — U07.1 COVID: Primary | ICD-10-CM

## 2023-11-30 PROCEDURE — 99213 OFFICE O/P EST LOW 20 MIN: CPT | Performed by: NURSE PRACTITIONER

## 2023-11-30 RX ORDER — HYDROCODONE POLISTIREX AND CHLORPHENIRAMINE POLISTIREX 10; 8 MG/5ML; MG/5ML
5 SUSPENSION, EXTENDED RELEASE ORAL
Qty: 115 ML | Refills: 0 | Status: SHIPPED | OUTPATIENT
Start: 2023-11-30

## 2023-11-30 RX ORDER — NIRMATRELVIR AND RITONAVIR 300-100 MG
3 KIT ORAL 2 TIMES DAILY
Qty: 30 TABLET | Refills: 0 | Status: SHIPPED | OUTPATIENT
Start: 2023-11-30 | End: 2023-12-05

## 2023-11-30 NOTE — PROGRESS NOTES
COVID-19 Outpatient Progress Note    Assessment/Plan:    Pt presents today via televideo for symptoms that started on 11/26 & positive covid test at home on 11/28. Pt notes he has been taking Dayquil/Nyquil without much relief. He does feel slightly better today. Pt is a candidate for Paxlovid and is agreeable to treatment. We reviewed medication and s/e. Pt will hold his Crestor while taking Paxlovid. Tussionex syrup ordered HS PRN. Medication and s/e reviewed. Rest, fluids and Vitamin C, D & zinc encouraged. Quarantine guidelines reviewed. Patient is encouraged to call our office for any questions/concerns, persistent or worsening symptoms. Patient states they understand and agree with treatment plan. Problem List Items Addressed This Visit    None  Visit Diagnoses       COVID    -  Primary    Relevant Medications    nirmatrelvir & ritonavir (Paxlovid, 300/100,) tablet therapy pack    Hydrocod Aristeo-Chlorphe Aristeo ER (TUSSIONEX) 10-8 mg/5 mL ER suspension           Disposition:     Patient with asymptomatic/mild COVID-19: They were recommended to isolate for at least 5 days (day 0 is the day symptoms appeared or the date the specimen was collected for the positive test for people who are asymptomatic). If they are asymptomatic or symptoms are improving with no fevers in the past 24 hours, isolation may be ended followed by 5 days of wearing a high quality mask when around others to minimize risk of infecting others. They should wear a mask through day 10 and a test-based strategy may be used to remove a mask sooner. I have spent a total time of 15 minutes on the day of the encounter for this patient including discussing prognosis, risks and benefits of treatment options, instructions for management and patient and family education.        Encounter provider: CHICHI Shelby     Provider located at: [address] [place] [address] [address] [facility] [address]  [phone]     Recent Visits  Date Type Provider Dept   11/27/23 Telephone CHICHI Crespo [address] [facility] Karla Paez   Showing recent visits within past 7 days and meeting all other requirements  Today's Visits  Date Type Provider Dept   11/30/23 Telemedicine Pill[facility]arlit[facility]im[facility] Steve CHICHI [address] [facility]   Showing today's visits and meeting all other requirements  Future Appointments  No visits were found meeting these conditions. Showing future appointments within next 150 days and meeting all other requirements     This virtual check-in was done via [address] [place] [address] and patient was informed that this is a secure, HIPAA-compliant platform. He agrees to proceed. Patient agrees to participate in a virtual check in via telephone or video visit instead of presenting to the office to address urgent/immediate medical needs. Patient is aware this is a billable service. He acknowledged consent and understanding of privacy and security of the video platform. The patient has agreed to participate and understands they can discontinue the visit at any time. After connecting through [facility], the patient was identified by name and date of birth. Sagrario Rai was informed that this was a telemedicine visit and that the exam was being conducted confidentially over secure lines. My office door was closed. No one else was in the room. Sagrario Rai acknowledged consent and understanding of privacy and security of the telemedicine visit. I informed the patient that I have reviewed his record in Epic and presented the opportunity for him to ask any questions regarding the visit today. The patient agreed to participate. Subjective:   Sagrario Rai is a 46 y.o. male who has been screened for COVID-19. Symptom change since last report: improving.  Patient's symptoms include fever (resolved), chills, fatigue, nasal congestion, sore throat (mild), cough (semi-productive), shortness of breath (occasional w/ lying down), diarrhea (yesterday), myalgias and headache.     - Date of symptom onset: 11/26/2023  - Date of positive COVID-19 test: 11/28/2023. Type of test: Home antigen. Patient with typical symptoms of COVID-19 and they attest that they were positive on home rapid antigen testing. Image of positive result is not able to be uploaded into their chart. COVID-19 vaccination status: Fully vaccinated with booster    Bhavin Leija has been staying home and has isolated themselves in his home. He is taking care to not share personal items and is cleaning all surfaces that are touched often, like counters, tabletops, and doorknobs using household cleaning sprays or wipes. He is wearing a mask when he leaves his room. Pt presents today via televideo for symptoms that started on 11/26 & positive covid test at home on 11/28. Pt notes he has been taking Dayquil/Nyquil without much relief. He does feel slightly better today. Pt is a candidate for Paxlovid and is agreeable to treatment. We reviewed medication and s/e. Pt will hold his Crestor while taking Paxlovid. Tussionex syrup ordered HS PRN. Medication and s/e reviewed. Rest, fluids and Vitamin C, D & zinc encouraged. Quarantine guidelines reviewed. Patient is encouraged to call our office for any questions/concerns, persistent or worsening symptoms. Patient states they understand and agree with treatment plan. Lab Results   Component Value Date    SARSCOV2 NOT DETECTED 11/13/2021    [address] Negative 11/30/2022       Review of Systems   Constitutional:  Positive for chills, fatigue and fever (resolved). HENT:  Positive for congestion and sore throat (mild). Respiratory:  Positive for cough (semi-productive) and shortness of breath (occasional w/ lying down). Gastrointestinal:  Positive for diarrhea (yesterday). Musculoskeletal:  Positive for myalgias.    Neurological:  Positive for headaches. Current Outpatient Medications on File Prior to Visit   Medication Sig    albuterol (ProAir HFA) 90 mcg/act inhaler Inhale 2 puffs every 6 (six) hours as needed for wheezing    azithromycin (Zithromax) 250 mg tablet Take 2 tablets (500 mg total) by mouth daily for 1 day, THEN 1 tablet (250 mg total) daily for 4 days.     busPIRone (BUSPAR) 15 mg tablet TAKE 1 TABLET BY MOUTH TWICE A DAY    cyclobenzaprine (FLEXERIL) 10 mg tablet TAKE 1 TABLET (10 MG TOTAL) BY MOUTH DAILY AT BEDTIME AS NEEDED FOR MUSCLE SPASMS    famotidine (PEPCID) 20 mg tablet TAKE 1 TABLET BY MOUTH EVERY DAY    levothyroxine 50 mcg tablet TAKE 1 TABLET BY MOUTH EVERY DAY    losartan (COZAAR) 50 mg tablet TAKE ONE TABLET BY MOUTH EVERY DAY    pantoprazole (PROTONIX) 40 mg tablet TAKE 1 TABLET BY MOUTH EVERY DAY    rosuvastatin (CRESTOR) 20 MG tablet Take 1 tablet (20 mg total) by mouth daily    tadalafil (CIALIS) 5 MG tablet TAKE 1 TABLET BY MOUTH EVERY DAY       Objective:    /100   Temp 97.5 °F (36.4 °C)   Ht 6' (1.829 m)   Wt 116 kg (255 lb)   BMI 34.58 kg/m²        Physical Exam  CHICHI Mcdonald

## 2023-12-01 DIAGNOSIS — E03.9 HYPOTHYROIDISM, UNSPECIFIED TYPE: ICD-10-CM

## 2023-12-01 DIAGNOSIS — F43.22 ADJUSTMENT DISORDER WITH ANXIETY: ICD-10-CM

## 2023-12-01 RX ORDER — BUSPIRONE HYDROCHLORIDE 15 MG/1
TABLET ORAL
Qty: 60 TABLET | Refills: 2 | OUTPATIENT
Start: 2023-12-01

## 2023-12-01 RX ORDER — LEVOTHYROXINE SODIUM 0.05 MG/1
TABLET ORAL
Qty: 30 TABLET | Refills: 0 | Status: SHIPPED | OUTPATIENT
Start: 2023-12-01

## 2023-12-05 DIAGNOSIS — F43.22 ADJUSTMENT DISORDER WITH ANXIETY: ICD-10-CM

## 2023-12-06 RX ORDER — BUSPIRONE HYDROCHLORIDE 15 MG/1
TABLET ORAL
Qty: 60 TABLET | Refills: 2 | Status: SHIPPED | OUTPATIENT
Start: 2023-12-06

## 2023-12-09 DIAGNOSIS — I10 BENIGN ESSENTIAL HTN: ICD-10-CM

## 2023-12-09 RX ORDER — LOSARTAN POTASSIUM 50 MG/1
TABLET ORAL
Qty: 30 TABLET | Refills: 1 | Status: SHIPPED | OUTPATIENT
Start: 2023-12-09

## 2023-12-12 ENCOUNTER — TELEPHONE (OUTPATIENT)
Dept: FAMILY MEDICINE CLINIC | Facility: CLINIC | Age: 53
End: 2023-12-12

## 2023-12-12 DIAGNOSIS — F41.9 ANXIETY: Primary | ICD-10-CM

## 2023-12-12 RX ORDER — ALPRAZOLAM 0.5 MG/1
TABLET ORAL
Qty: 2 TABLET | Refills: 0 | Status: SHIPPED | OUTPATIENT
Start: 2023-12-12

## 2023-12-12 NOTE — TELEPHONE ENCOUNTER
Patient is having a biopsy done tomorrow on a suspicious spot on his back where the dermatologist suspects melanoma. Patient is very and anxious. He is wondering if you can send a script in for something to lessen his anxiety prior to the procedure. He uses the [facility] in [place].

## 2023-12-27 DIAGNOSIS — E03.9 HYPOTHYROIDISM, UNSPECIFIED TYPE: ICD-10-CM

## 2023-12-27 RX ORDER — LEVOTHYROXINE SODIUM 0.05 MG/1
TABLET ORAL
Qty: 30 TABLET | Refills: 2 | Status: SHIPPED | OUTPATIENT
Start: 2023-12-27

## 2024-01-03 DIAGNOSIS — N52.9 ERECTILE DYSFUNCTION, UNSPECIFIED ERECTILE DYSFUNCTION TYPE: ICD-10-CM

## 2024-01-03 DIAGNOSIS — R35.0 URINARY FREQUENCY: ICD-10-CM

## 2024-01-03 RX ORDER — TADALAFIL 5 MG/1
5 TABLET ORAL DAILY
Qty: 30 TABLET | Refills: 11 | Status: SHIPPED | OUTPATIENT
Start: 2024-01-03

## 2024-01-06 LAB
BUN SERPL-MCNC: 12 MG/DL (ref 6–24)
BUN/CREAT SERPL: 10 (ref 9–20)
CREAT SERPL-MCNC: 1.16 MG/DL (ref 0.76–1.27)
EGFR: 75 ML/MIN/1.73

## 2024-02-08 DIAGNOSIS — I10 BENIGN ESSENTIAL HTN: ICD-10-CM

## 2024-02-09 RX ORDER — LOSARTAN POTASSIUM 50 MG/1
50 TABLET ORAL DAILY
Qty: 90 TABLET | Refills: 1 | Status: SHIPPED | OUTPATIENT
Start: 2024-02-09

## 2024-02-15 LAB
ALBUMIN SERPL-MCNC: 4.4 G/DL (ref 3.8–4.9)
ALBUMIN/GLOB SERPL: 2.2 {RATIO} (ref 1.2–2.2)
ALP SERPL-CCNC: 62 IU/L (ref 44–121)
ALT SERPL-CCNC: 33 IU/L (ref 0–44)
AST SERPL-CCNC: 26 IU/L (ref 0–40)
BILIRUB SERPL-MCNC: 1.4 MG/DL (ref 0–1.2)
BUN SERPL-MCNC: 15 MG/DL (ref 6–24)
BUN/CREAT SERPL: 16 (ref 9–20)
CALCIUM SERPL-MCNC: 9.5 MG/DL (ref 8.7–10.2)
CHLORIDE SERPL-SCNC: 101 MMOL/L (ref 96–106)
CHOLEST SERPL-MCNC: 149 MG/DL (ref 100–199)
CHOLEST/HDLC SERPL: 3.3 RATIO (ref 0–5)
CO2 SERPL-SCNC: 23 MMOL/L (ref 20–29)
CREAT SERPL-MCNC: 0.93 MG/DL (ref 0.76–1.27)
EGFR: 98 ML/MIN/1.73
GLOBULIN SER-MCNC: 2 G/DL (ref 1.5–4.5)
GLUCOSE SERPL-MCNC: 117 MG/DL (ref 70–99)
HDLC SERPL-MCNC: 45 MG/DL
LDLC SERPL CALC-MCNC: 62 MG/DL (ref 0–99)
LDLC SERPL DIRECT ASSAY-MCNC: 72 MG/DL (ref 0–99)
POTASSIUM SERPL-SCNC: 4.9 MMOL/L (ref 3.5–5.2)
PROT SERPL-MCNC: 6.4 G/DL (ref 6–8.5)
PSA SERPL-MCNC: 6 NG/ML (ref 0–4)
SL AMB VLDL CHOLESTEROL CALC: 42 MG/DL (ref 5–40)
SODIUM SERPL-SCNC: 138 MMOL/L (ref 134–144)
TRIGL SERPL-MCNC: 268 MG/DL (ref 0–149)

## 2024-02-20 ENCOUNTER — OFFICE VISIT (OUTPATIENT)
Dept: FAMILY MEDICINE CLINIC | Facility: CLINIC | Age: 54
End: 2024-02-20
Payer: COMMERCIAL

## 2024-02-20 VITALS
WEIGHT: 252 LBS | OXYGEN SATURATION: 96 % | TEMPERATURE: 97.1 F | HEIGHT: 72 IN | HEART RATE: 78 BPM | BODY MASS INDEX: 34.13 KG/M2 | RESPIRATION RATE: 16 BRPM | DIASTOLIC BLOOD PRESSURE: 88 MMHG | SYSTOLIC BLOOD PRESSURE: 128 MMHG

## 2024-02-20 DIAGNOSIS — K21.00 GASTROESOPHAGEAL REFLUX DISEASE WITH ESOPHAGITIS WITHOUT HEMORRHAGE: ICD-10-CM

## 2024-02-20 DIAGNOSIS — I10 ESSENTIAL HYPERTENSION: Primary | ICD-10-CM

## 2024-02-20 DIAGNOSIS — Z13.6 SCREENING FOR CARDIOVASCULAR CONDITION: ICD-10-CM

## 2024-02-20 DIAGNOSIS — R73.03 PREDIABETES: ICD-10-CM

## 2024-02-20 DIAGNOSIS — E03.9 HYPOTHYROIDISM, UNSPECIFIED TYPE: ICD-10-CM

## 2024-02-20 DIAGNOSIS — E78.2 MIXED HYPERLIPIDEMIA: ICD-10-CM

## 2024-02-20 PROCEDURE — 99214 OFFICE O/P EST MOD 30 MIN: CPT | Performed by: NURSE PRACTITIONER

## 2024-02-20 RX ORDER — FENOFIBRATE 145 MG/1
145 TABLET, COATED ORAL DAILY
Qty: 90 TABLET | Refills: 3 | Status: SHIPPED | OUTPATIENT
Start: 2024-02-20

## 2024-02-20 RX ORDER — PANTOPRAZOLE SODIUM 40 MG/1
40 TABLET, DELAYED RELEASE ORAL DAILY
Qty: 90 TABLET | Refills: 3 | Status: SHIPPED | OUTPATIENT
Start: 2024-02-20

## 2024-02-20 NOTE — PROGRESS NOTES
Assessment/Plan:     Diagnoses and all orders for this visit:    Essential hypertension    Pt to continue to check BP at home. His BP is borderline. We discussed that we will increase or change his Losartan if his BP starts to go above 130/90. EKG due next visit. Continue good diet and exercise. Patient is encouraged to call our office for any questions/concerns, persistent or worsening symptoms. Patient states they understand and agree with treatment plan.     Mixed hyperlipidemia  -     Lipid panel; Future  -     Hepatic function panel; Future    -     fenofibrate (TRICOR) 145 mg tablet; Take 1 tablet (145 mg total) by mouth daily    Add Tricor to patient's regimen of Crestor to help lower TG levels. Reviewed medication along w/ possible side effects. Pt has tried good diet and exercise over the last 3 months, however the TG have not budged. Repeat LP & LFTs in 3 months or sooner PRN. Patient is encouraged to call our office for any questions/concerns, persistent or worsening symptoms. Patient states they understand and agree with treatment plan.     Gastroesophageal reflux disease with esophagitis without hemorrhage  -     pantoprazole (PROTONIX) 40 mg tablet; Take 1 tablet (40 mg total) by mouth daily    Refills prescribed. Pt well controlled on Protonix 40 mg alone. Continue medication.    Hypothyroidism, unspecified type  -     TSH, 3rd generation with Free T4 reflex; Future    Pt managed on Levothyroxine 50 mcg daily. Check TSH w/ upcoming labs in 3 months.    Prediabetes  -     Hemoglobin A1C; Future    Check A1c w/ upcoming labs. Continue good diet and exercise.    Screening for cardiovascular condition  -     CBC and differential; Future          Pt to RTO in 3 months for annual physical, EKG, HTN and hyperlipidemia recheck or sooner PRN.  Labs to be done prior to next OV.    Subjective:      Patient ID: Johnny Hernandez is a 53 y.o. male.    Pt here today for 3 month recheck of his hyperlipidemia and  HTN.  He admits he has been working hard on diet and exercise.  He has increased his consumption of fish in replacement of red meats.  He is going to the gym and brisk walking for 3.5 miles and doing weights after.  Pt is feeling well.  His chronic cough he had last year has dissipated.        The following portions of the patient's history were reviewed and updated as appropriate: allergies, current medications, past family history, past medical history, past social history, past surgical history, and problem list.    Review of Systems    As noted per HPI.    Objective:      /88   Pulse 78   Temp (!) 97.1 °F (36.2 °C)   Resp 16   Ht 6' (1.829 m)   Wt 114 kg (252 lb)   SpO2 96%   BMI 34.18 kg/m²          Physical Exam  Vitals reviewed.   Constitutional:       Appearance: Normal appearance.   Cardiovascular:      Rate and Rhythm: Normal rate and regular rhythm.      Pulses: Normal pulses.      Heart sounds: Normal heart sounds.   Pulmonary:      Effort: Pulmonary effort is normal.      Breath sounds: Normal breath sounds.   Neurological:      Mental Status: He is alert and oriented to person, place, and time. Mental status is at baseline.   Psychiatric:         Mood and Affect: Mood normal.         Behavior: Behavior normal.         Thought Content: Thought content normal.         Judgment: Judgment normal.

## 2024-03-16 DIAGNOSIS — F43.22 ADJUSTMENT DISORDER WITH ANXIETY: ICD-10-CM

## 2024-03-18 RX ORDER — BUSPIRONE HYDROCHLORIDE 15 MG/1
TABLET ORAL
Qty: 60 TABLET | Refills: 2 | Status: SHIPPED | OUTPATIENT
Start: 2024-03-18

## 2024-03-23 ENCOUNTER — TELEPHONE (OUTPATIENT)
Dept: UROLOGY | Facility: CLINIC | Age: 54
End: 2024-03-23

## 2024-03-24 NOTE — TELEPHONE ENCOUNTER
BMP & PSA received in Feli's old [facility], appear to be new orders. Not sure if pt is seeing Feli at her new office in [place] or if these results are meant for [facility] urology in any event please call pt with elevated psa results. should make appt in the next few months if he does not already have one thanks. fortunately mri last year looks reasuring but psa has further risen and should discuss options

## 2024-04-06 DIAGNOSIS — E03.9 HYPOTHYROIDISM, UNSPECIFIED TYPE: ICD-10-CM

## 2024-04-08 ENCOUNTER — OFFICE VISIT (OUTPATIENT)
Dept: UROLOGY | Facility: AMBULATORY SURGERY CENTER | Age: 54
End: 2024-04-08
Payer: COMMERCIAL

## 2024-04-08 VITALS
HEART RATE: 83 BPM | WEIGHT: 252 LBS | OXYGEN SATURATION: 95 % | SYSTOLIC BLOOD PRESSURE: 130 MMHG | BODY MASS INDEX: 34.18 KG/M2 | DIASTOLIC BLOOD PRESSURE: 86 MMHG

## 2024-04-08 DIAGNOSIS — R97.20 ELEVATED PSA: Primary | ICD-10-CM

## 2024-04-08 PROCEDURE — 99213 OFFICE O/P EST LOW 20 MIN: CPT

## 2024-04-08 RX ORDER — LEVOTHYROXINE SODIUM 0.05 MG/1
TABLET ORAL
Qty: 30 TABLET | Refills: 2 | Status: SHIPPED | OUTPATIENT
Start: 2024-04-08 | End: 2024-04-12

## 2024-04-08 NOTE — PROGRESS NOTES
Office Visit- Urology  Johnny Hernandez 1970 MRN: [id number]      Assessment/Discussion/Plan    53 y.o. male managed by     Elevated PSA  -History of an elevated PSA of 4.9 in October 2022, 4.2 in January 2023 and most recently 6.0 in February 2024  -S/p multiparametric MRI of the prostate in February 2023 which returned PI-RADS 2 with no dominant lesion and calculated prostate volume of 61 g  -BRIDGET today with no palpable concern for prostate cancer  -Discussed options with patient including continued PSA surveillance, obtainment of repeat multiparametric MRI of the prostate, or prostate biopsy and discussed these options in depth  -Through shared decision making decided on repeat PSA in the next few days.  Patient advised to avoid sexual activity for days before obtainment PSA.  If total PSA is still elevated or if free PSA is low we will consider prostate biopsy/repeat multiparametric rather prostate.  If total PSA decreases to baseline at 4.0 we will continue with PSA surveillance with another PSA obtainment in 6 months      Chief Complaint:   Johnny is a 53 y.o. male presenting to the office for a follow up visit regarding elevated PSA        Subjective    Patient is a 52-year-old male who presents for follow-up regards to elevated PSA.  He was last in the office in June 2023.  His PSA was 2.8 in 2021-3.8 in 2021 4.9 in 2022, 4.2 in January 2023 and then most recently 6.0 in February 2024.  He has s/p multiparametric MR of the prostate February 2023 which returned PI-RADS 2 no dominant lesion and calculated prostate volume 61 g.  He has no known family history of prostate cancer no known family history of breast, ovarian, pancreatic cancer.  He does have some urinary tract symptoms including nocturia x 2/frequency.  He utilizes Cialis 5 mg for control of urinary tract symptoms    ROS:   Review of Systems   Constitutional: Negative.  Negative for chills, fatigue and fever.   HENT: Negative.     Respiratory:   Negative for shortness of breath.    Cardiovascular:  Negative for chest pain.   Gastrointestinal: Negative.  Negative for abdominal pain.   Endocrine: Negative.    Musculoskeletal: Negative.    Skin: Negative.    Neurological: Negative.  Negative for dizziness and light-headedness.   Hematological: Negative.    Psychiatric/Behavioral: Negative.           Past Medical History  Past Medical History:   Diagnosis Date    Acute sinusitis 2011    Allergic     Anxiety     Cellulitis 2011    Cellulitis of neck 2010    Colon polyp     Facial cellulitis 2010    Hypercholesterolemia     Hypertension     Lymph nodes enlarged     Sensory disturbance     Sleep apnea     Visual impairment        Past Surgical History  Past Surgical History:   Procedure Laterality Date    COLONOSCOPY         Past Family History  Family History   Problem Relation Age of Onset    Hypertension Mother     Hyperlipidemia Mother     Coronary artery disease Father     Heart disease Father     Heart attack Brother         B/D 47 MI    Leukemia Brother     Leukemia Son     Mental illness Neg Hx     Substance Abuse Neg Hx     Alcohol abuse Neg Hx     Colon polyps Neg Hx     Colon cancer Neg Hx        Past Social history  Social History     Socioeconomic History    Marital status: /Civil Union     Spouse name: Not on file    Number of children: Not on file    Years of education: Not on file    Highest education level: Not on file   Occupational History    Not on file   Tobacco Use    Smoking status: Never    Smokeless tobacco: Never    Tobacco comments:     DENIED: HISTORY OF TOBACCO USE   Vaping Use    Vaping status: Never Used   Substance and Sexual Activity    Alcohol use: Yes     Alcohol/week: 2.0 standard drinks of alcohol     Types: 2 Cans of beer per week     Comment: SOCIAL; 2 BEERS A DAY AS PER ALL SCRIPTS     Drug use: No    Sexual activity: Yes     Partners: Female     Birth control/protection: Coitus interruptus, None   Other Topics  Concern    Not on file   Social History Narrative    Always uses seat belt    Caffeine use: 4 cups of coffee qd    Has smoke detectors     Social Determinants of Health     Financial Resource Strain: Not on file   Food Insecurity: Not on file   Transportation Needs: Not on file   Physical Activity: Not on file   Stress: Not on file   Social Connections: Not on file   Intimate Partner Violence: Not on file   Housing Stability: Not on file       Current Medications  Current Outpatient Medications   Medication Sig Dispense Refill    busPIRone (BUSPAR) 15 mg tablet TAKE 1 TABLET BY MOUTH TWICE A DAY 60 tablet 2    cyclobenzaprine (FLEXERIL) 10 mg tablet TAKE 1 TABLET (10 MG TOTAL) BY MOUTH DAILY AT BEDTIME AS NEEDED FOR MUSCLE SPASMS 30 tablet 0    fenofibrate (TRICOR) 145 mg tablet Take 1 tablet (145 mg total) by mouth daily 90 tablet 3    levothyroxine 50 mcg tablet TAKE 1 TABLET BY MOUTH EVERY DAY 30 tablet 2    losartan (COZAAR) 50 mg tablet TAKE 1 TABLET BY MOUTH EVERY DAY 90 tablet 1    pantoprazole (PROTONIX) 40 mg tablet Take 1 tablet (40 mg total) by mouth daily 90 tablet 3    rosuvastatin (CRESTOR) 20 MG tablet Take 1 tablet (20 mg total) by mouth daily 90 tablet 3    tadalafil (CIALIS) 5 MG tablet Take 1 tablet (5 mg total) by mouth daily 30 tablet 11     No current facility-administered medications for this visit.       Allergies  Allergies   Allergen Reactions    Ace Inhibitors Cough    Penicillins Other (See Comments)     Other reaction(s): unkown, childhood rxn       OBJECTIVE    Vitals   Vitals:    04/08/24 1303   BP: 130/86   BP Location: Left arm   Patient Position: Sitting   Cuff Size: Large   Pulse: 83   SpO2: 95%   Weight: 114 kg (252 lb)       PVR:    Physical Exam  Constitutional:       General: He is not in acute distress.     Appearance: Normal appearance. He is normal weight. He is not ill-appearing or toxic-appearing.   HENT:      Head: Normocephalic and atraumatic.   Eyes:       Conjunctiva/sclera: Conjunctivae normal.   Cardiovascular:      Rate and Rhythm: Normal rate.   Pulmonary:      Effort: Pulmonary effort is normal. No respiratory distress.   Genitourinary:     Comments: On prostate exam no overt nodularity, induration, asymmetry appreciated  Musculoskeletal:         General: Normal range of motion.   Skin:     General: Skin is warm and dry.   Neurological:      General: No focal deficit present.      Mental Status: He is alert and oriented to person, place, and time.      Cranial Nerves: No cranial nerve deficit.   Psychiatric:         Mood and Affect: Mood normal.         Behavior: Behavior normal.         Thought Content: Thought content normal.          Labs:     Lab Results   Component Value Date    PSA 6.0 (H) 02/14/2024    PSA 4.2 (H) 01/11/2023    PSA 4.9 (H) 10/24/2022    PSA 3.8 09/21/2021     Lab Results   Component Value Date    CREATININE 0.93 02/14/2024      Lab Results   Component Value Date    HGBA1C 6.1 (H) 11/14/2023     Lab Results   Component Value Date    GLUCOSE 121 (H) 07/27/2017    CALCIUM 9.6 07/27/2017     07/27/2017    K 4.9 02/14/2024    CO2 23 02/14/2024     02/14/2024    BUN 15 02/14/2024    CREATININE 0.93 02/14/2024       I have personally reviewed all pertinent lab results and reviewed with patient    Imaging       Rafael Dove PA-C  Date: 4/8/2024 Time: 1:07 PM  [facility]    This note was written using fluency dictation software. Please excuse any resulting minor grammatical errors.

## 2024-04-12 DIAGNOSIS — E03.9 HYPOTHYROIDISM, UNSPECIFIED TYPE: ICD-10-CM

## 2024-04-12 LAB
ALBUMIN SERPL-MCNC: 4.5 G/DL (ref 3.8–4.9)
ALP SERPL-CCNC: 63 IU/L (ref 44–121)
ALT SERPL-CCNC: 35 IU/L (ref 0–44)
AST SERPL-CCNC: 26 IU/L (ref 0–40)
BASOPHILS # BLD AUTO: 0.1 X10E3/UL (ref 0–0.2)
BASOPHILS NFR BLD AUTO: 1 %
BILIRUB DIRECT SERPL-MCNC: 0.22 MG/DL (ref 0–0.4)
BILIRUB SERPL-MCNC: 0.9 MG/DL (ref 0–1.2)
CHOLEST SERPL-MCNC: 140 MG/DL (ref 100–199)
CHOLEST/HDLC SERPL: 3.4 RATIO (ref 0–5)
EOSINOPHIL # BLD AUTO: 0.2 X10E3/UL (ref 0–0.4)
EOSINOPHIL NFR BLD AUTO: 3 %
ERYTHROCYTE [DISTWIDTH] IN BLOOD BY AUTOMATED COUNT: 12.8 % (ref 11.6–15.4)
EST. AVERAGE GLUCOSE BLD GHB EST-MCNC: 123 MG/DL
HBA1C MFR BLD: 5.9 % (ref 4.8–5.6)
HCT VFR BLD AUTO: 44.2 % (ref 37.5–51)
HDLC SERPL-MCNC: 41 MG/DL
HGB BLD-MCNC: 14.6 G/DL (ref 13–17.7)
IMM GRANULOCYTES # BLD: 0 X10E3/UL (ref 0–0.1)
IMM GRANULOCYTES NFR BLD: 0 %
LDLC SERPL CALC-MCNC: 56 MG/DL (ref 0–99)
LDLC SERPL DIRECT ASSAY-MCNC: 61 MG/DL (ref 0–99)
LYMPHOCYTES # BLD AUTO: 2.6 X10E3/UL (ref 0.7–3.1)
LYMPHOCYTES NFR BLD AUTO: 43 %
MCH RBC QN AUTO: 28.7 PG (ref 26.6–33)
MCHC RBC AUTO-ENTMCNC: 33 G/DL (ref 31.5–35.7)
MCV RBC AUTO: 87 FL (ref 79–97)
MONOCYTES # BLD AUTO: 0.4 X10E3/UL (ref 0.1–0.9)
MONOCYTES NFR BLD AUTO: 7 %
NEUTROPHILS # BLD AUTO: 2.9 X10E3/UL (ref 1.4–7)
NEUTROPHILS NFR BLD AUTO: 46 %
PLATELET # BLD AUTO: 221 X10E3/UL (ref 150–450)
PROT SERPL-MCNC: 6.4 G/DL (ref 6–8.5)
PSA FREE MFR SERPL: 15.7 %
PSA FREE SERPL-MCNC: 0.88 NG/ML
PSA SERPL-MCNC: 5.6 NG/ML (ref 0–4)
RBC # BLD AUTO: 5.09 X10E6/UL (ref 4.14–5.8)
SL AMB T4, FREE (DIRECT): 1.43 NG/DL (ref 0.82–1.77)
SL AMB VLDL CHOLESTEROL CALC: 43 MG/DL (ref 5–40)
TRIGL SERPL-MCNC: 276 MG/DL (ref 0–149)
TSH SERPL DL<=0.005 MIU/L-ACNC: 5.2 UIU/ML (ref 0.45–4.5)
WBC # BLD AUTO: 6.1 X10E3/UL (ref 3.4–10.8)

## 2024-04-12 RX ORDER — LEVOTHYROXINE SODIUM 0.07 MG/1
75 TABLET ORAL DAILY
Qty: 90 TABLET | Refills: 1 | Status: SHIPPED | OUTPATIENT
Start: 2024-04-12

## 2024-04-26 ENCOUNTER — TELEPHONE (OUTPATIENT)
Dept: UROLOGY | Facility: CLINIC | Age: 54
End: 2024-04-26

## 2024-04-26 DIAGNOSIS — R97.20 ELEVATED PSA: Primary | ICD-10-CM

## 2024-04-26 NOTE — TELEPHONE ENCOUNTER
Called the patient and left a VM, I am calling to assist him in scheduling his MRI however he can jace forward and do that by calling [phone].    Following scheduling that appointment I will be reaching out to him or he can call us back at [phone] to schedule a follow-up visit for consent, and he will also need to obtain PSA prior to follow-up.      Please ensure patient is scheduled for MRI, schedule a follow-up visit for consent, and he will also need to obtain PSA prior to follow-up, thank-you.

## 2024-04-26 NOTE — TELEPHONE ENCOUNTER
I called patient to review his PSA.  His PSA is still persistently elevated at 5.6.  I discussed options including proceeding with a biopsy as patient has not had a previous prostate biopsy, obtainment of a repeat multiparametric where the prostate and continue surveillance of PSA.  I do think that since patient is young and has a persistently elevated PSA it is reasonable to consider a baseline prostate biopsy.  Patient is agreeable     We will plan for transperineal biopsy due to enlarged prostate and patient preference.  Obtain repeat MRI since MRI is over a year old to determine if there is PI-RADS lesions and if MRI does reveal PI-RADS lesions for utilization of MRI fusion prostate biopsy.  Will have patient follow-up in the office after obtainment of MRI for discussion/consent signing    Clerical staff please arrange for MRI and then a follow-up visit for consent.  Please also advised patient to obtain PSA prior to follow-up

## 2024-04-30 NOTE — TELEPHONE ENCOUNTER
Pt is scheduled for MRI 5-10-24    Currently scheduled for follow-up in Nov-24, due to availability    Would it be ok if I use one of your Urgent slots to get him in sooner?

## 2024-05-01 NOTE — TELEPHONE ENCOUNTER
Pt has been scheduled as follows:    Date: 6/25/2024     Arrival Time: 8:05 AM     Visit Type: FOLLOW UP [facility] [[id number]]     Provider: Rafael Dove PA-C Department: [facility]

## 2024-05-04 DIAGNOSIS — E78.2 MIXED HYPERLIPIDEMIA: ICD-10-CM

## 2024-05-05 RX ORDER — ROSUVASTATIN CALCIUM 20 MG/1
20 TABLET, COATED ORAL DAILY
Qty: 30 TABLET | Refills: 26 | Status: SHIPPED | OUTPATIENT
Start: 2024-05-05

## 2024-05-10 ENCOUNTER — HOSPITAL ENCOUNTER (OUTPATIENT)
Dept: RADIOLOGY | Age: 54
Discharge: HOME | End: 2024-05-10
Payer: COMMERCIAL

## 2024-05-10 DIAGNOSIS — R97.20 ELEVATED PSA: ICD-10-CM

## 2024-05-10 PROCEDURE — 72197 MRI PELVIS W/O & W/DYE: CPT

## 2024-05-10 PROCEDURE — 76377 3D RENDER W/INTRP POSTPROCES: CPT

## 2024-05-10 RX ORDER — GADOBUTROL 604.72 MG/ML
11 INJECTION INTRAVENOUS
Status: COMPLETED | OUTPATIENT
Start: 2024-05-10 | End: 2024-05-10

## 2024-05-10 RX ADMIN — GADOBUTROL 11 ML: 604.72 INJECTION INTRAVENOUS at 09:23

## 2024-05-21 ENCOUNTER — OFFICE VISIT (OUTPATIENT)
Dept: FAMILY MEDICINE CLINIC | Facility: CLINIC | Age: 54
End: 2024-05-21
Payer: COMMERCIAL

## 2024-05-21 VITALS
WEIGHT: 250.7 LBS | DIASTOLIC BLOOD PRESSURE: 86 MMHG | RESPIRATION RATE: 16 BRPM | HEART RATE: 94 BPM | BODY MASS INDEX: 33.96 KG/M2 | TEMPERATURE: 97.8 F | SYSTOLIC BLOOD PRESSURE: 132 MMHG | HEIGHT: 72 IN | OXYGEN SATURATION: 95 %

## 2024-05-21 DIAGNOSIS — E78.2 MIXED HYPERLIPIDEMIA: ICD-10-CM

## 2024-05-21 DIAGNOSIS — Z00.00 ANNUAL PHYSICAL EXAM: Primary | ICD-10-CM

## 2024-05-21 DIAGNOSIS — E03.9 HYPOTHYROIDISM, UNSPECIFIED TYPE: ICD-10-CM

## 2024-05-21 DIAGNOSIS — I10 ESSENTIAL HYPERTENSION: ICD-10-CM

## 2024-05-21 PROCEDURE — 93000 ELECTROCARDIOGRAM COMPLETE: CPT | Performed by: NURSE PRACTITIONER

## 2024-05-21 PROCEDURE — 99396 PREV VISIT EST AGE 40-64: CPT | Performed by: NURSE PRACTITIONER

## 2024-05-21 PROCEDURE — 99214 OFFICE O/P EST MOD 30 MIN: CPT | Performed by: NURSE PRACTITIONER

## 2024-05-21 RX ORDER — FENOFIBRATE 145 MG/1
145 TABLET, COATED ORAL DAILY
COMMUNITY

## 2024-05-21 RX ORDER — LOSARTAN POTASSIUM 100 MG/1
100 TABLET ORAL DAILY
Qty: 90 TABLET | Refills: 2 | Status: SHIPPED | OUTPATIENT
Start: 2024-05-21

## 2024-05-21 NOTE — PROGRESS NOTES
Adult Annual Physical  Name: Johnny Hernandez      : 1970      MRN: [id number]  Encounter Provider: CHICHI Restrepo  Encounter Date: 2024   Encounter department: [facility]    Assessment & Plan   1. Annual physical exam    2. Essential hypertension  -     POCT ECG    BP borderline today. Plan to increase Losartan from 50 mg to 100 mg daily. EKG today. Repeat BP in 3 months or sooner PRN.    3. Hypothyroidism, unspecified type    Repeat TSH due in 24. Pt currently on Levothyroxine 75 mg daily.    4. Mixed hyperlipidemia  -     Lipid panel; Future; Expected date: 2024  -     Hepatic function panel; Future; Expected date: 2024    Pt will continue Crestor. He will restart Fenofibrate for his TG. Repeat LP & LFTs in 3 months. Patient is encouraged to call our office for any questions/concerns, persistent or worsening symptoms. Patient states they understand and agree with treatment plan.         Pt to f/u in 3 months for BP, hyperlipidemia recheck or sooner PRN for 30 minutes.      Immunizations and preventive care screenings were discussed with patient today. Appropriate education was printed on patient's after visit summary.    Discussed risks and benefits of prostate cancer screening. We discussed the controversial history of PSA screening for prostate cancer in the [place] as well as the risk of over detection and over treatment of prostate cancer by way of PSA screening.  The patient understands that PSA blood testing is an imperfect way to screen for prostate cancer and that elevated PSA levels in the blood may also be caused by infection, inflammation, prostatic trauma or manipulation, urological procedures, or by benign prostatic enlargement.    The role of the digital rectal examination in prostate cancer screening was also discussed and I discussed with him that there is large interobserver variability in the findings  of digital rectal examination.    Counseling:  Alcohol/drug use: discussed moderation in alcohol intake, the recommendations for healthy alcohol use, and avoidance of illicit drug use.  Dental Health: discussed importance of regular tooth brushing, flossing, and dental visits.  Injury prevention: discussed safety/seat belts, safety helmets, smoke detectors, carbon dioxide detectors, and smoking near bedding or upholstery.  Sexual health: discussed sexually transmitted diseases, partner selection, use of condoms, avoidance of unintended pregnancy, and contraceptive alternatives.  Exercise: the importance of regular exercise/physical activity was discussed. Recommend exercise 3-5 times per week for at least 30 minutes.          History of Present Illness     Adult Annual Physical:  Patient presents for annual physical.     Diet and Physical Activity:  - Diet/Nutrition: well balanced diet and consuming 3-5 servings of fruits/vegetables daily.  - Exercise: moderate cardiovascular exercise and 3-4 times a week on average.    General Health:  - Sleep: 4-6 hours of sleep on average and sleeps well.  - Hearing: normal hearing right ear and normal hearing left ear.  - Vision: vision problems, goes for regular eye exams, wears glasses and no vision problems.  - Dental: brushes teeth twice daily and regular dental visits.     Health:    - Urinary symptoms: none.     Advanced Care Planning:  - Has an advanced directive?: yes    - Has a durable medical POA?: yes    - ACP document given to patient?: yes      Review of Systems   Constitutional: Negative.    HENT: Negative.     Respiratory: Negative.  Negative for cough.    Cardiovascular: Negative.    Gastrointestinal: Negative.    Genitourinary: Negative.    Musculoskeletal: Negative.  Negative for myalgias.   Neurological: Negative.    Psychiatric/Behavioral: Negative.       Pertinent Medical History       Objective     /86   Pulse 94   Temp 97.8 °F (36.6 °C)   Resp  16   Ht 6' (1.829 m)   Wt 114 kg (250 lb 11.2 oz)   SpO2 95%   BMI 34.00 kg/m²     Physical Exam  Vitals and nursing note reviewed.   Constitutional:       General: He is not in acute distress.     Appearance: Normal appearance. He is well-developed.   HENT:      Head: Normocephalic and atraumatic.      Right Ear: Tympanic membrane, ear canal and external ear normal.      Left Ear: Tympanic membrane, ear canal and external ear normal.   Eyes:      Conjunctiva/sclera: Conjunctivae normal.   Cardiovascular:      Rate and Rhythm: Normal rate and regular rhythm.      Pulses: Normal pulses.      Heart sounds: Normal heart sounds. No murmur heard.  Pulmonary:      Effort: Pulmonary effort is normal. No respiratory distress.      Breath sounds: Normal breath sounds.   Abdominal:      General: There is no distension.      Palpations: Abdomen is soft. There is no mass.      Tenderness: There is no abdominal tenderness. There is no guarding or rebound.      Hernia: No hernia is present.   Musculoskeletal:         General: No swelling. Normal range of motion.      Cervical back: Normal range of motion and neck supple.      Right lower leg: No edema.      Left lower leg: No edema.   Skin:     General: Skin is warm and dry.      Capillary Refill: Capillary refill takes less than 2 seconds.   Neurological:      Mental Status: He is alert and oriented to person, place, and time. Mental status is at baseline.   Psychiatric:         Mood and Affect: Mood normal.         Behavior: Behavior normal.         Thought Content: Thought content normal.         Judgment: Judgment normal.

## 2024-06-17 DIAGNOSIS — F43.22 ADJUSTMENT DISORDER WITH ANXIETY: ICD-10-CM

## 2024-06-17 RX ORDER — BUSPIRONE HYDROCHLORIDE 15 MG/1
TABLET ORAL
Qty: 60 TABLET | Refills: 5 | Status: SHIPPED | OUTPATIENT
Start: 2024-06-17

## 2024-06-21 LAB
ALBUMIN SERPL-MCNC: 4.7 G/DL (ref 3.8–4.9)
ALP SERPL-CCNC: 48 IU/L (ref 44–121)
ALT SERPL-CCNC: 26 IU/L (ref 0–44)
AST SERPL-CCNC: 19 IU/L (ref 0–40)
BASOPHILS # BLD AUTO: 0.1 X10E3/UL (ref 0–0.2)
BASOPHILS NFR BLD AUTO: 1 %
BILIRUB DIRECT SERPL-MCNC: 0.17 MG/DL (ref 0–0.4)
BILIRUB SERPL-MCNC: 0.6 MG/DL (ref 0–1.2)
CHOLEST SERPL-MCNC: 154 MG/DL (ref 100–199)
CHOLEST/HDLC SERPL: 3.3 RATIO (ref 0–5)
EOSINOPHIL # BLD AUTO: 0.1 X10E3/UL (ref 0–0.4)
EOSINOPHIL NFR BLD AUTO: 2 %
ERYTHROCYTE [DISTWIDTH] IN BLOOD BY AUTOMATED COUNT: 12.8 % (ref 11.6–15.4)
EST. AVERAGE GLUCOSE BLD GHB EST-MCNC: 131 MG/DL
HBA1C MFR BLD: 6.2 % (ref 4.8–5.6)
HCT VFR BLD AUTO: 43.2 % (ref 37.5–51)
HDLC SERPL-MCNC: 47 MG/DL
HGB BLD-MCNC: 14.3 G/DL (ref 13–17.7)
IMM GRANULOCYTES # BLD: 0.1 X10E3/UL (ref 0–0.1)
IMM GRANULOCYTES NFR BLD: 1 %
LDLC SERPL CALC-MCNC: 79 MG/DL (ref 0–99)
LYMPHOCYTES # BLD AUTO: 2.3 X10E3/UL (ref 0.7–3.1)
LYMPHOCYTES NFR BLD AUTO: 38 %
MCH RBC QN AUTO: 28.4 PG (ref 26.6–33)
MCHC RBC AUTO-ENTMCNC: 33.1 G/DL (ref 31.5–35.7)
MCV RBC AUTO: 86 FL (ref 79–97)
MONOCYTES # BLD AUTO: 0.5 X10E3/UL (ref 0.1–0.9)
MONOCYTES NFR BLD AUTO: 7 %
NEUTROPHILS # BLD AUTO: 3.1 X10E3/UL (ref 1.4–7)
NEUTROPHILS NFR BLD AUTO: 51 %
PLATELET # BLD AUTO: 239 X10E3/UL (ref 150–450)
PROT SERPL-MCNC: 6.6 G/DL (ref 6–8.5)
RBC # BLD AUTO: 5.04 X10E6/UL (ref 4.14–5.8)
SL AMB VLDL CHOLESTEROL CALC: 28 MG/DL (ref 5–40)
TRIGL SERPL-MCNC: 166 MG/DL (ref 0–149)
WBC # BLD AUTO: 6.1 X10E3/UL (ref 3.4–10.8)

## 2024-06-25 ENCOUNTER — OFFICE VISIT (OUTPATIENT)
Dept: UROLOGY | Facility: AMBULATORY SURGERY CENTER | Age: 54
End: 2024-06-25

## 2024-06-25 VITALS
WEIGHT: 256 LBS | BODY MASS INDEX: 34.67 KG/M2 | OXYGEN SATURATION: 98 % | SYSTOLIC BLOOD PRESSURE: 126 MMHG | DIASTOLIC BLOOD PRESSURE: 90 MMHG | HEIGHT: 72 IN | HEART RATE: 84 BPM

## 2024-06-25 DIAGNOSIS — R35.0 URINARY FREQUENCY: ICD-10-CM

## 2024-06-25 DIAGNOSIS — R97.20 ELEVATED PSA: Primary | ICD-10-CM

## 2024-06-25 DIAGNOSIS — N52.9 ERECTILE DYSFUNCTION, UNSPECIFIED ERECTILE DYSFUNCTION TYPE: ICD-10-CM

## 2024-06-25 RX ORDER — TADALAFIL 5 MG/1
5 TABLET ORAL DAILY
Qty: 90 TABLET | Refills: 3 | Status: SHIPPED | OUTPATIENT
Start: 2024-06-25

## 2024-06-25 RX ORDER — LOSARTAN POTASSIUM 50 MG/1
TABLET ORAL
COMMUNITY
Start: 2024-06-14

## 2024-06-25 NOTE — PROGRESS NOTES
Office Visit- Urology  Johnny Hernandez 1970 MRN: [id number]      Assessment/Discussion/Plan    53 y.o. male managed by     Elevated PSA  -History of an elevated PSA of 4.9 in October 2022, 4.2 in January 2023 and most recently 6.0 in February 2024 with decreased to 5.6 on recheck in April 2024  with a free PSA percentage of 15%  -S/p multiparametric MRI of the prostate in February 2023 which returned PI-RADS 2 with no dominant lesion and calculated prostate volume of 61 g  -S/p repeat multiparametric MR of the prostate in May 2024 which returned PI-RADS 2 with no dominant lesion and a calculated prostate volume of 68 g  -BRIDGET in April 2024 with no palpable concern for prostate cancer  -Discussed options including proceeding with a prostate biopsy as the only definitive way to effectively rule in/rule out prostate cancer versus continued observation.  Patient does have numerous reassuring factors including 2 negative multiparametric MRI of the prostate's, low PSA density, reassuring BRIDGET.  At this point in time patient is comfortable with continued observation so we will plan to obtain a repeat PSA in October 2024.  Patient is aware that if PSA were to increase to 7 range and stay there consistently on recheck recommendation will be to proceed with prostate biopsy    2.  BPH with urinary tract symptoms  -Improved with use of Cialis 5 mg.  Patient wishes to continue with current regimen  -Refills given      Chief Complaint:   Johnny is a 53 y.o. male presenting to the office for a follow up visit regarding          Subjective    Hx 4/8/2024    Patient is a 52-year-old male who presents for follow-up regards to elevated PSA.  He was last in the office in June 2023.  His PSA was 2.8 in 2021-3.8 in 2021 4.9 in 2022, 4.2 in January 2023 and then most recently 6.0 in February 2024.  He has s/p multiparametric MR of the prostate February 2023 which returned PI-RADS 2 no dominant lesion and calculated prostate volume  61 g.  He has no known family history of prostate cancer no known family history of breast, ovarian, pancreatic cancer.  He does have some urinary tract symptoms including nocturia x 2/frequency.  He utilizes Cialis 5 mg for control of urinary tract symptoms    Hx 6/25/2024    Patient presents for follow-up.  In the interim since he was last seen he he had an updated PSA which returned at 5.6 with a  free PSA of 15%.  He obtained a repeat multiparametric MR of the prostate in May 2024 was returned with PI-RADS 2 with no dominant lesion and a calculated prostate volume of 68 g.  Patient is comfortable with continued observation.  Patient finds Cialis 5 mg to be effective for control of his urinary pattern    ROS:   Review of Systems   Constitutional: Negative.  Negative for chills, fatigue and fever.   HENT: Negative.     Respiratory:  Negative for shortness of breath.    Cardiovascular:  Negative for chest pain.   Gastrointestinal: Negative.  Negative for abdominal pain.   Endocrine: Negative.    Musculoskeletal: Negative.    Skin: Negative.    Neurological: Negative.  Negative for dizziness and light-headedness.   Hematological: Negative.    Psychiatric/Behavioral: Negative.           Past Medical History  Past Medical History:   Diagnosis Date    Acute sinusitis 2011    Allergic     Anxiety     Cellulitis 2011    Cellulitis of neck 2010    Colon polyp     Facial cellulitis 2010    Hypercholesterolemia     Hypertension     Lymph nodes enlarged     Sensory disturbance     Sleep apnea     Visual impairment        Past Surgical History  Past Surgical History:   Procedure Laterality Date    COLONOSCOPY         Past Family History  Family History   Problem Relation Age of Onset    Hypertension Mother     Hyperlipidemia Mother     Coronary artery disease Father     Heart disease Father     Heart attack Brother         B/D 47 MI    Leukemia Brother     Leukemia Son     Mental illness Neg Hx     Substance Abuse Neg Hx      Alcohol abuse Neg Hx     Colon polyps Neg Hx     Colon cancer Neg Hx        Past Social history  Social History     Socioeconomic History    Marital status: /Civil Union     Spouse name: Not on file    Number of children: Not on file    Years of education: Not on file    Highest education level: Not on file   Occupational History    Not on file   Tobacco Use    Smoking status: Never    Smokeless tobacco: Never    Tobacco comments:     DENIED: HISTORY OF TOBACCO USE   Vaping Use    Vaping status: Never Used   Substance and Sexual Activity    Alcohol use: Yes     Alcohol/week: 2.0 standard drinks of alcohol     Types: 2 Cans of beer per week     Comment: SOCIAL; 2 BEERS A DAY AS PER ALL SCRIPTS     Drug use: No    Sexual activity: Yes     Partners: Female     Birth control/protection: Coitus interruptus, None   Other Topics Concern    Not on file   Social History Narrative    Always uses seat belt    Caffeine use: 4 cups of coffee qd    Has smoke detectors     Social Determinants of Health     Financial Resource Strain: Not on file   Food Insecurity: Not on file   Transportation Needs: Not on file   Physical Activity: Not on file   Stress: Not on file   Social Connections: Not on file   Intimate Partner Violence: Not on file   Housing Stability: Not on file       Current Medications  Current Outpatient Medications   Medication Sig Dispense Refill    busPIRone (BUSPAR) 15 mg tablet TAKE 1 TABLET BY MOUTH TWICE A DAY 60 tablet 5    cyclobenzaprine (FLEXERIL) 10 mg tablet TAKE 1 TABLET (10 MG TOTAL) BY MOUTH DAILY AT BEDTIME AS NEEDED FOR MUSCLE SPASMS 30 tablet 0    fenofibrate (TRICOR) 145 mg tablet Take 145 mg by mouth daily      levothyroxine 75 mcg tablet Take 1 tablet (75 mcg total) by mouth daily 90 tablet 1    losartan (COZAAR) 50 mg tablet       pantoprazole (PROTONIX) 40 mg tablet Take 1 tablet (40 mg total) by mouth daily 90 tablet 3    rosuvastatin (CRESTOR) 20 MG tablet TAKE 1 TABLET BY MOUTH EVERY  DAY 30 tablet 26    tadalafil (CIALIS) 5 MG tablet Take 1 tablet (5 mg total) by mouth daily 30 tablet 11     No current facility-administered medications for this visit.       Allergies  Allergies   Allergen Reactions    Ace Inhibitors Cough    Penicillins Other (See Comments)     Other reaction(s): unkown, childhood rxn       OBJECTIVE    Vitals   Vitals:    06/25/24 0801   BP: 126/90   BP Location: Left leg   Patient Position: Sitting   Cuff Size: Large   Pulse: 84   SpO2: 98%   Weight: 116 kg (256 lb)   Height: 6' (1.829 m)       PVR:    Physical Exam  Constitutional:       General: He is not in acute distress.     Appearance: Normal appearance. He is normal weight. He is not ill-appearing or toxic-appearing.   HENT:      Head: Normocephalic and atraumatic.   Eyes:      Conjunctiva/sclera: Conjunctivae normal.   Cardiovascular:      Rate and Rhythm: Normal rate.   Pulmonary:      Effort: Pulmonary effort is normal. No respiratory distress.   Skin:     General: Skin is warm and dry.   Neurological:      General: No focal deficit present.      Mental Status: He is alert and oriented to person, place, and time.      Cranial Nerves: No cranial nerve deficit.   Psychiatric:         Mood and Affect: Mood normal.         Behavior: Behavior normal.         Thought Content: Thought content normal.          Labs:     Lab Results   Component Value Date    PSA 5.6 (H) 04/11/2024    PSA 6.0 (H) 02/14/2024    PSA 4.2 (H) 01/11/2023    PSA 4.9 (H) 10/24/2022     Lab Results   Component Value Date    CREATININE 0.93 02/14/2024      Lab Results   Component Value Date    HGBA1C 6.2 (H) 06/20/2024     Lab Results   Component Value Date    GLUCOSE 121 (H) 07/27/2017    CALCIUM 9.6 07/27/2017     07/27/2017    K 4.9 02/14/2024    CO2 23 02/14/2024     02/14/2024    BUN 15 02/14/2024    CREATININE 0.93 02/14/2024       I have personally reviewed all pertinent lab results and reviewed with patient    Imaging       Rafael  Rajiv Dove PA-C  Date: 6/25/2024 Time: 8:06 AM  [facility]    This note was written using fluency dictation software. Please excuse any resulting minor grammatical errors.

## 2024-08-12 ENCOUNTER — TELEPHONE (OUTPATIENT)
Dept: FAMILY MEDICINE CLINIC | Facility: CLINIC | Age: 54
End: 2024-08-12

## 2024-08-12 DIAGNOSIS — I10 ESSENTIAL HYPERTENSION: Primary | ICD-10-CM

## 2024-08-12 RX ORDER — LOSARTAN POTASSIUM 100 MG/1
100 TABLET ORAL DAILY
Qty: 90 TABLET | Refills: 3 | Status: SHIPPED | OUTPATIENT
Start: 2024-08-12

## 2024-08-12 NOTE — TELEPHONE ENCOUNTER
Pt's wife called stating that the pt's RX for Losartan was changed from 50mg to 100mg and that the RX that was sent over for the new dose was electronically cx'd. I said that I would speak with Janel regarding this. I called and s/w the pt he is aware that the RX for Losartan 100mg was resent to [facility] in [place].

## 2024-08-14 DIAGNOSIS — E78.2 MIXED HYPERLIPIDEMIA: Primary | ICD-10-CM

## 2024-08-14 RX ORDER — FENOFIBRATE 145 MG/1
145 TABLET, COATED ORAL DAILY
Qty: 30 TABLET | Refills: 5 | Status: SHIPPED | OUTPATIENT
Start: 2024-08-14

## 2024-08-14 NOTE — TELEPHONE ENCOUNTER
Reason for call:   [x] Refill   [] Prior Auth  [] Other:     Office:   [x] PCP/Provider - CHICHI Restrepo  (previously prescribed by historical provider)  [] Specialty/Provider -     Medication: fenofibrate (TRICOR) 145 mg tablet     Dose/Frequency: 145 mg, Oral, Daily     Quantity: ?    Pharmacy: [facility]/pharmacy #[id number] [facility] [address] [address]     Does the patient have enough for 3 days?   [] Yes   [x] No - Send as HP to POD

## 2024-08-17 LAB — TSH SERPL DL<=0.005 MIU/L-ACNC: 2.09 UIU/ML (ref 0.45–4.5)

## 2024-08-21 ENCOUNTER — OFFICE VISIT (OUTPATIENT)
Dept: FAMILY MEDICINE CLINIC | Facility: CLINIC | Age: 54
End: 2024-08-21
Payer: COMMERCIAL

## 2024-08-21 VITALS
BODY MASS INDEX: 34.95 KG/M2 | SYSTOLIC BLOOD PRESSURE: 130 MMHG | HEIGHT: 72 IN | TEMPERATURE: 97.5 F | HEART RATE: 88 BPM | RESPIRATION RATE: 15 BRPM | WEIGHT: 258 LBS | OXYGEN SATURATION: 97 % | DIASTOLIC BLOOD PRESSURE: 80 MMHG

## 2024-08-21 DIAGNOSIS — Z13.89 SCREENING FOR BLOOD OR PROTEIN IN URINE: ICD-10-CM

## 2024-08-21 DIAGNOSIS — H60.543 ECZEMA OF BOTH EXTERNAL EARS: ICD-10-CM

## 2024-08-21 DIAGNOSIS — E55.9 VITAMIN D DEFICIENCY: ICD-10-CM

## 2024-08-21 DIAGNOSIS — M25.522 PAIN OF BOTH ELBOWS: ICD-10-CM

## 2024-08-21 DIAGNOSIS — E03.8 OTHER SPECIFIED HYPOTHYROIDISM: ICD-10-CM

## 2024-08-21 DIAGNOSIS — R73.03 PREDIABETES: ICD-10-CM

## 2024-08-21 DIAGNOSIS — I10 ESSENTIAL HYPERTENSION: Primary | ICD-10-CM

## 2024-08-21 DIAGNOSIS — E78.2 MIXED HYPERLIPIDEMIA: ICD-10-CM

## 2024-08-21 DIAGNOSIS — R06.2 WHEEZING: ICD-10-CM

## 2024-08-21 DIAGNOSIS — M25.521 PAIN OF BOTH ELBOWS: ICD-10-CM

## 2024-08-21 DIAGNOSIS — B35.4 TINEA CORPORIS: ICD-10-CM

## 2024-08-21 PROCEDURE — 99214 OFFICE O/P EST MOD 30 MIN: CPT | Performed by: NURSE PRACTITIONER

## 2024-08-21 RX ORDER — CLOTRIMAZOLE AND BETAMETHASONE DIPROPIONATE 10; .64 MG/G; MG/G
CREAM TOPICAL 2 TIMES DAILY
Qty: 45 G | Refills: 1 | Status: SHIPPED | OUTPATIENT
Start: 2024-08-21

## 2024-08-21 RX ORDER — PIMECROLIMUS 10 MG/G
CREAM TOPICAL
Qty: 30 G | Refills: 0 | Status: SHIPPED | OUTPATIENT
Start: 2024-08-21

## 2024-08-21 RX ORDER — ALBUTEROL SULFATE 90 UG/1
2 AEROSOL, METERED RESPIRATORY (INHALATION) EVERY 6 HOURS PRN
Qty: 18 G | Refills: 3 | Status: SHIPPED | OUTPATIENT
Start: 2024-08-21

## 2024-08-21 NOTE — PROGRESS NOTES
Ambulatory Visit  Name: Johnny Hernandez      : 1970      MRN: [id number]  Encounter Provider: CHICHI Restrepo  Encounter Date: 2024   Encounter department: [facility]    Assessment & Plan   1. Essential hypertension    Plan to Losartan 100 mg daily. Recheck BP in 6 months at next OV.    2. Tinea corporis  -     clotrimazole-betamethasone (LOTRISONE) 1-0.05 % cream; Apply topically 2 (two) times a day    Pt will apply Lotrisone to left elbow until rash has gone, then for 1 week thereafter. If he does not note any improvement to the rash within 2 weeks of use, he is to contact our office. Patient is encouraged to call our office for any questions/concerns, persistent or worsening symptoms. Patient states they understand and agree with treatment plan.     3. Wheezing  -     albuterol (PROVENTIL HFA,VENTOLIN HFA) 90 mcg/act inhaler; Inhale 2 puffs every 6 (six) hours as needed for wheezing or shortness of breath    Refills prescribed.    4. Mixed hyperlipidemia  -     Lipid Panel with Direct LDL reflex; Future; Expected date: 2024    Managed w/ Fenofibrate and Rosuvastatin. Check lipids this fall.    5. Prediabetes  -     Hemoglobin A1C; Future    Check A1c this fall. Continue good diet and exercise.    6. Vitamin D deficiency  -     Vitamin D 25 hydroxy; Future    7. Pain of both elbows  -     Lyme Total AB W Reflex to IGM/IGG; Future    Possible tendonitis. RICE protocol and Tylenol encouraged PRN. Pt deferring PT at this time. Will check lyme titer. Patient is encouraged to call our office for any questions/concerns, persistent or worsening symptoms. Patient states they understand and agree with treatment plan.     8. Screening for blood or protein in urine  -     UA (URINE) with reflex to Scope; Future    9. Eczema of both external ears  -     pimecrolimus (ELIDEL) 1 % cream; Apply twice a day to external ear canal as needed for  itching.    Start Elidel cream PRN. Pt will place small amount of cream on Qtip and gently paint the external ear canal. Patient is encouraged to call our office for any questions/concerns, persistent or worsening symptoms. Patient states they understand and agree with treatment plan.     10. Other specified hypothyroidism    Managed w/ Levothyroxine 75 mg daily.         Pt to RTO in 6 months for BP recheck or sooner PRN.     History of Present Illness     Pt presents today for recheck of his HTN.  He did have his Losartan increased from 50 mg to 100 mg last visit.  He denies any side effects with this medication increase.  Pt admits he has not checked his BP at home.  Pt does admit to a rash to his left elbow he noted back in January.  It has not gotten better or worse. No pain or itching.  Pt has not tried anything OTC.  Additionally, pt notes some itching to yfn internal ears.  Denies pain, discharge, fever, chills.          Review of Systems  As noted per HPI.    Objective     /80   Pulse 88   Temp 97.5 °F (36.4 °C)   Resp 15   Ht 6' (1.829 m)   Wt 117 kg (258 lb)   SpO2 97%   BMI 34.99 kg/m²     Physical Exam  Vitals reviewed.   Constitutional:       Appearance: Normal appearance.   HENT:      Ears:      Comments: Dryness and flaking to yfn internal ear canal  Cardiovascular:      Pulses: Normal pulses.      Heart sounds: Normal heart sounds.   Pulmonary:      Effort: Pulmonary effort is normal.      Breath sounds: Normal breath sounds.   Musculoskeletal:      Right elbow: Normal. No swelling. Normal range of motion.      Left elbow: Normal. No swelling. Normal range of motion.   Skin:     Findings: Rash (faintly erythematic small patch to left lateral elbow) present.   Neurological:      Mental Status: He is alert.   Psychiatric:         Mood and Affect: Mood normal.         Behavior: Behavior normal.         Thought Content: Thought content normal.         Judgment: Judgment normal.

## 2024-10-12 DIAGNOSIS — E03.9 HYPOTHYROIDISM, UNSPECIFIED TYPE: ICD-10-CM

## 2024-10-12 RX ORDER — LEVOTHYROXINE SODIUM 75 UG/1
75 TABLET ORAL DAILY
Qty: 30 TABLET | Refills: 5 | Status: SHIPPED | OUTPATIENT
Start: 2024-10-12

## 2024-10-31 ENCOUNTER — TELEPHONE (OUTPATIENT)
Age: 54
End: 2024-10-31

## 2024-10-31 NOTE — TELEPHONE ENCOUNTER
Ambulatory referral needed.  Patient is requesting an insurance referral for the following specialty: Dermatology      Test Name / Order Name: Skin rash    DX Code:     Date Of Service: 11/07/24    Location/Facility Name/Address/Phone #: [facility]/ [id number] [address]/ [phone]    Location / Facility NPI: [id number]    Best Phone # To Reach The Patient:

## 2024-11-14 ENCOUNTER — RESULTS FOLLOW-UP (OUTPATIENT)
Dept: OTHER | Facility: HOSPITAL | Age: 54
End: 2024-11-14

## 2024-11-14 LAB
25(OH)D3+25(OH)D2 SERPL-MCNC: 38.5 NG/ML (ref 30–100)
APPEARANCE UR: CLEAR
B BURGDOR IGG PATRN SER IB-IMP: NEGATIVE
B BURGDOR IGM PATRN SER IB-IMP: NEGATIVE
B BURGDOR18KD IGG SER QL IB: NORMAL
B BURGDOR23KD IGG SER QL IB: NORMAL
B BURGDOR23KD IGM SER QL IB: NORMAL
B BURGDOR28KD IGG SER QL IB: NORMAL
B BURGDOR30KD IGG SER QL IB: NORMAL
B BURGDOR39KD IGG SER QL IB: NORMAL
B BURGDOR39KD IGM SER QL IB: NORMAL
B BURGDOR41KD IGG SER QL IB: NORMAL
B BURGDOR41KD IGM SER QL IB: NORMAL
B BURGDOR45KD IGG SER QL IB: NORMAL
B BURGDOR58KD IGG SER QL IB: NORMAL
B BURGDOR66KD IGG SER QL IB: NORMAL
B BURGDOR93KD IGG SER QL IB: NORMAL
BILIRUB UR QL STRIP: NEGATIVE
CHOLEST SERPL-MCNC: 156 MG/DL (ref 100–199)
COLOR UR: YELLOW
EST. AVERAGE GLUCOSE BLD GHB EST-MCNC: 128 MG/DL
GLUCOSE UR QL: NEGATIVE
HBA1C MFR BLD: 6.1 % (ref 4.8–5.6)
HDLC SERPL-MCNC: 44 MG/DL
HGB UR QL STRIP: NEGATIVE
KETONES UR QL STRIP: NEGATIVE
LDLC SERPL CALC-MCNC: 82 MG/DL (ref 0–99)
LDLC/HDLC SERPL: 1.9 RATIO (ref 0–3.6)
LEUKOCYTE ESTERASE UR QL STRIP: NEGATIVE
MICRO URNS: NORMAL
NITRITE UR QL STRIP: NEGATIVE
PH UR STRIP: 7 [PH] (ref 5–7.5)
PROT UR QL STRIP: NEGATIVE
PSA FREE MFR SERPL: 22.8 %
PSA FREE SERPL-MCNC: 0.91 NG/ML
PSA SERPL-MCNC: 4 NG/ML (ref 0–4)
SL AMB VLDL CHOLESTEROL CALC: 30 MG/DL (ref 5–40)
SP GR UR: 1.01 (ref 1–1.03)
TRIGL SERPL-MCNC: 174 MG/DL (ref 0–149)
UROBILINOGEN UR STRIP-ACNC: 1 MG/DL (ref 0.2–1)

## 2024-11-15 RX ORDER — AZITHROMYCIN 250 MG/1
TABLET, FILM COATED ORAL
COMMUNITY
Start: 2024-10-01

## 2024-11-18 ENCOUNTER — RESULTS FOLLOW-UP (OUTPATIENT)
Dept: FAMILY MEDICINE CLINIC | Facility: CLINIC | Age: 54
End: 2024-11-18

## 2024-11-18 DIAGNOSIS — E78.2 MIXED HYPERLIPIDEMIA: Primary | ICD-10-CM

## 2024-11-18 DIAGNOSIS — R73.03 PREDIABETES: ICD-10-CM

## 2024-11-19 ENCOUNTER — OFFICE VISIT (OUTPATIENT)
Dept: UROLOGY | Facility: AMBULATORY SURGERY CENTER | Age: 54
End: 2024-11-19
Payer: COMMERCIAL

## 2024-11-19 VITALS
WEIGHT: 265 LBS | HEART RATE: 92 BPM | OXYGEN SATURATION: 96 % | BODY MASS INDEX: 35.89 KG/M2 | DIASTOLIC BLOOD PRESSURE: 84 MMHG | HEIGHT: 72 IN | SYSTOLIC BLOOD PRESSURE: 146 MMHG

## 2024-11-19 DIAGNOSIS — R97.20 ELEVATED PSA: Primary | ICD-10-CM

## 2024-11-19 PROCEDURE — 99213 OFFICE O/P EST LOW 20 MIN: CPT

## 2024-11-19 RX ORDER — FLUCONAZOLE 200 MG/1
200 TABLET ORAL ONCE
COMMUNITY
Start: 2024-11-07

## 2024-11-19 NOTE — PROGRESS NOTES
Office Visit- Urology  Johnny Hernandez 1970 MRN: [id number]      Assessment/Discussion/Plan    53 y.o. male managed by     Elevated PSA  -History of an elevated PSA of 4.9 in October 2022, 4.2 in January 2023 and most recently 6.0 in February 2024 with decreased to 5.6 on recheck in April 2024  with a free PSA percentage of 15%  -Most recent 4.0 on 11/13/2024  -S/p multiparametric MRI of the prostate in February 2023 which returned PI-RADS 2 with no dominant lesion and calculated prostate volume of 61 g  -S/p repeat multiparametric MR of the prostate in May 2024 which returned PI-RADS 2 with no dominant lesion and a calculated prostate volume of 68 g  -BRIDGET in April 2024 with no palpable concern for prostate cancer  -Discussed with patient that he still has the option of proceeding with a prostate biopsy as the only definitive way to rule in/rule out prostate cancer versus continued observation.  I think that observation is a reasonable consideration given that patient has decreasing PSA, 2 negative MRIs, reassuring PSA density, high free PSA percentage, and reassuring BRIDGET.  Patient is comfortable with continued observation  -Follow-up in 6 months with a PSA prior      Chief Complaint:   Johnny is a 53 y.o. male presenting to the office for a follow up visit regarding elevated PSA        Subjective    Hx 4/8/2024     Patient is a 52-year-old male who presents for follow-up regards to elevated PSA.  He was last in the office in June 2023.  His PSA was 2.8 in 2021-3.8 in 2021 4.9 in 2022, 4.2 in January 2023 and then most recently 6.0 in February 2024.  He has s/p multiparametric MR of the prostate February 2023 which returned PI-RADS 2 no dominant lesion and calculated prostate volume 61 g.  He has no known family history of prostate cancer no known family history of breast, ovarian, pancreatic cancer.  He does have some urinary tract symptoms including nocturia x 2/frequency.  He utilizes Cialis 5 mg for  control of urinary tract symptoms     Hx 6/25/2024     Patient presents for follow-up.  In the interim since he was last seen he he had an updated PSA which returned at 5.6 with a  free PSA of 15%.  He obtained a repeat multiparametric MR of the prostate in May 2024 was returned with PI-RADS 2 with no dominant lesion and a calculated prostate volume of 68 g.  Patient is comfortable with continued observation.  Patient finds Cialis 5 mg to be effective for control of his urinary pattern    Hx 11/19/2024  Patient presents the office today for follow-up for 6-month PSA interval.  PSA dropped to 4.0 he denies any changes urinary tract symptoms.  He is comfortable with continued observation        AUA SYMPTOM SCORE      Flowsheet Row Most Recent Value   AUA SYMPTOM SCORE    How often have you had a sensation of not emptying your bladder completely after you finished urinating? 2 (P)     How often have you had to urinate again less than two hours after you finished urinating? 3 (P)     How often have you found you stopped and started again several times when you urinate? 2 (P)     How often have you found it difficult to postpone urination? 2 (P)     How often have you had a weak urinary stream? 1 (P)     How often have you had to push or strain to begin urination? 0 (P)     How many times did you most typically get up to urinate from the time you went to bed at night until the time you got up in the morning? 5 (P)     Quality of Life: If you were to spend the rest of your life with your urinary condition just the way it is now, how would you feel about that? 2 (P)     AUA SYMPTOM SCORE 15 (P)              ROS:   Review of Systems   Constitutional: Negative.  Negative for chills, fatigue and fever.   HENT: Negative.     Respiratory:  Negative for shortness of breath.    Cardiovascular:  Negative for chest pain.   Gastrointestinal: Negative.  Negative for abdominal pain.   Endocrine: Negative.    Musculoskeletal: Negative.     Skin: Negative.    Neurological: Negative.  Negative for dizziness and light-headedness.   Hematological: Negative.    Psychiatric/Behavioral: Negative.           Past Medical History  Past Medical History:   Diagnosis Date    Acute sinusitis 2011    Allergic     Anxiety     Cellulitis 2011    Cellulitis of neck 2010    Colon polyp     Facial cellulitis 2010    Hypercholesterolemia     Hypertension     Lymph nodes enlarged     Sensory disturbance     Sleep apnea     Visual impairment        Past Surgical History  Past Surgical History:   Procedure Laterality Date    COLONOSCOPY         Past Family History  Family History   Problem Relation Age of Onset    Hypertension Mother     Hyperlipidemia Mother     Coronary artery disease Father     Heart disease Father     Heart attack Brother         B/D 47 MI    Leukemia Brother     Leukemia Son     Mental illness Neg Hx     Substance Abuse Neg Hx     Alcohol abuse Neg Hx     Colon polyps Neg Hx     Colon cancer Neg Hx        Past Social history  Social History     Socioeconomic History    Marital status: /Civil Union     Spouse name: Not on file    Number of children: Not on file    Years of education: Not on file    Highest education level: Not on file   Occupational History    Not on file   Tobacco Use    Smoking status: Never    Smokeless tobacco: Never    Tobacco comments:     DENIED: HISTORY OF TOBACCO USE   Vaping Use    Vaping status: Never Used   Substance and Sexual Activity    Alcohol use: Yes     Alcohol/week: 2.0 standard drinks of alcohol     Types: 2 Cans of beer per week     Comment: SOCIAL; 2 BEERS A DAY AS PER ALL SCRIPTS     Drug use: No    Sexual activity: Yes     Partners: Female     Birth control/protection: Coitus interruptus, None   Other Topics Concern    Not on file   Social History Narrative    Always uses seat belt    Caffeine use: 4 cups of coffee qd    Has smoke detectors     Social Drivers of Health     Financial Resource Strain: Not  on file   Food Insecurity: Not on file   Transportation Needs: Not on file   Physical Activity: Not on file   Stress: Not on file   Social Connections: Not on file   Intimate Partner Violence: Not on file   Housing Stability: Not on file       Current Medications  Current Outpatient Medications   Medication Sig Dispense Refill    azithromycin (ZITHROMAX) 250 mg tablet TAKE 2 TABLETS BY MOUTH TODAY, THEN TAKE 1 TABLET DAILY FOR 4 DAYS AS DIRECTED      fluconazole (DIFLUCAN) 200 mg tablet Take 200 mg by mouth once      albuterol (PROVENTIL HFA,VENTOLIN HFA) 90 mcg/act inhaler Inhale 2 puffs every 6 (six) hours as needed for wheezing or shortness of breath 18 g 3    busPIRone (BUSPAR) 15 mg tablet TAKE 1 TABLET BY MOUTH TWICE A DAY 60 tablet 5    clotrimazole-betamethasone (LOTRISONE) 1-0.05 % cream Apply topically 2 (two) times a day 45 g 1    cyclobenzaprine (FLEXERIL) 10 mg tablet TAKE 1 TABLET (10 MG TOTAL) BY MOUTH DAILY AT BEDTIME AS NEEDED FOR MUSCLE SPASMS 30 tablet 0    fenofibrate (TRICOR) 145 mg tablet Take 1 tablet (145 mg total) by mouth daily 30 tablet 5    levothyroxine 75 mcg tablet TAKE 1 TABLET BY MOUTH EVERY DAY 30 tablet 5    losartan (COZAAR) 100 MG tablet Take 1 tablet (100 mg total) by mouth daily 90 tablet 3    pantoprazole (PROTONIX) 40 mg tablet Take 1 tablet (40 mg total) by mouth daily 90 tablet 3    pimecrolimus (ELIDEL) 1 % cream Apply twice a day to external ear canal as needed for itching. 30 g 0    rosuvastatin (CRESTOR) 20 MG tablet TAKE 1 TABLET BY MOUTH EVERY DAY 30 tablet 26    tadalafil (CIALIS) 5 MG tablet Take 1 tablet (5 mg total) by mouth daily 90 tablet 3     No current facility-administered medications for this visit.       Allergies  Allergies   Allergen Reactions    Ace Inhibitors Cough    Penicillins Other (See Comments)     Other reaction(s): unkown, childhood rxn       OBJECTIVE    Vitals   Vitals:    11/19/24 1246   Weight: 120 kg (265 lb)   Height: 6' (1.829 m)        PVR:    Physical Exam  Constitutional:       General: He is not in acute distress.     Appearance: Normal appearance. He is normal weight. He is not ill-appearing or toxic-appearing.   HENT:      Head: Normocephalic and atraumatic.   Eyes:      Conjunctiva/sclera: Conjunctivae normal.   Cardiovascular:      Rate and Rhythm: Normal rate.   Pulmonary:      Effort: Pulmonary effort is normal. No respiratory distress.   Skin:     General: Skin is warm and dry.   Neurological:      General: No focal deficit present.      Mental Status: He is alert and oriented to person, place, and time.      Cranial Nerves: No cranial nerve deficit.   Psychiatric:         Mood and Affect: Mood normal.         Behavior: Behavior normal.         Thought Content: Thought content normal.       Labs:     Lab Results   Component Value Date    PSA 4.0 11/13/2024    PSA 5.6 (H) 04/11/2024    PSA 6.0 (H) 02/14/2024    PSA 4.2 (H) 01/11/2023     Lab Results   Component Value Date    CREATININE 0.93 02/14/2024      Lab Results   Component Value Date    HGBA1C 6.1 (H) 11/13/2024     Lab Results   Component Value Date    GLUCOSE 121 (H) 07/27/2017    CALCIUM 9.6 07/27/2017     07/27/2017    K 4.9 02/14/2024    CO2 23 02/14/2024     02/14/2024    BUN 15 02/14/2024    CREATININE 0.93 02/14/2024       I have personally reviewed all pertinent lab results and reviewed with patient    Imaging       Rafael Dove PA-C  Date: 11/19/2024 Time: 12:47 PM  [facility]    This note was written using fluency dictation software. Please excuse any resulting minor grammatical errors.

## 2024-11-20 ENCOUNTER — TELEPHONE (OUTPATIENT)
Dept: UROLOGY | Facility: AMBULATORY SURGERY CENTER | Age: 54
End: 2024-11-20

## 2024-12-09 DIAGNOSIS — F43.22 ADJUSTMENT DISORDER WITH ANXIETY: ICD-10-CM

## 2024-12-10 RX ORDER — BUSPIRONE HYDROCHLORIDE 15 MG/1
15 TABLET ORAL 2 TIMES DAILY
Qty: 60 TABLET | Refills: 5 | Status: SHIPPED | OUTPATIENT
Start: 2024-12-10

## 2025-02-08 DIAGNOSIS — E78.2 MIXED HYPERLIPIDEMIA: ICD-10-CM

## 2025-02-08 DIAGNOSIS — K21.00 GASTROESOPHAGEAL REFLUX DISEASE WITH ESOPHAGITIS WITHOUT HEMORRHAGE: ICD-10-CM

## 2025-02-10 RX ORDER — PANTOPRAZOLE SODIUM 40 MG/1
40 TABLET, DELAYED RELEASE ORAL DAILY
Qty: 30 TABLET | Refills: 5 | Status: SHIPPED | OUTPATIENT
Start: 2025-02-10

## 2025-02-10 RX ORDER — FENOFIBRATE 145 MG/1
145 TABLET, COATED ORAL DAILY
Qty: 30 TABLET | Refills: 0 | Status: SHIPPED | OUTPATIENT
Start: 2025-02-10

## 2025-02-25 ENCOUNTER — OFFICE VISIT (OUTPATIENT)
Dept: FAMILY MEDICINE CLINIC | Facility: CLINIC | Age: 55
End: 2025-02-25
Payer: COMMERCIAL

## 2025-02-25 VITALS
SYSTOLIC BLOOD PRESSURE: 139 MMHG | TEMPERATURE: 97.6 F | BODY MASS INDEX: 35.73 KG/M2 | WEIGHT: 263.8 LBS | DIASTOLIC BLOOD PRESSURE: 83 MMHG | RESPIRATION RATE: 15 BRPM | HEART RATE: 82 BPM | HEIGHT: 72 IN | OXYGEN SATURATION: 97 %

## 2025-02-25 DIAGNOSIS — G47.30 SLEEP APNEA, UNSPECIFIED TYPE: ICD-10-CM

## 2025-02-25 DIAGNOSIS — E78.2 MIXED HYPERLIPIDEMIA: ICD-10-CM

## 2025-02-25 DIAGNOSIS — Z12.11 COLON CANCER SCREENING: ICD-10-CM

## 2025-02-25 DIAGNOSIS — E03.9 HYPOTHYROIDISM, UNSPECIFIED TYPE: ICD-10-CM

## 2025-02-25 DIAGNOSIS — E66.01 SEVERE OBESITY (BMI 35.0-39.9) WITH COMORBIDITY (HCC): ICD-10-CM

## 2025-02-25 DIAGNOSIS — F32.A ANXIETY AND DEPRESSION: ICD-10-CM

## 2025-02-25 DIAGNOSIS — Z82.49 FAMILY HISTORY OF CORONARY ARTERY DISEASE: ICD-10-CM

## 2025-02-25 DIAGNOSIS — F41.9 ANXIETY AND DEPRESSION: ICD-10-CM

## 2025-02-25 DIAGNOSIS — Z13.89 SCREENING FOR BLOOD OR PROTEIN IN URINE: ICD-10-CM

## 2025-02-25 DIAGNOSIS — I10 ESSENTIAL HYPERTENSION: Primary | ICD-10-CM

## 2025-02-25 DIAGNOSIS — R73.03 PREDIABETES: ICD-10-CM

## 2025-02-25 PROCEDURE — 99214 OFFICE O/P EST MOD 30 MIN: CPT | Performed by: NURSE PRACTITIONER

## 2025-02-25 RX ORDER — BUPROPION HYDROCHLORIDE 150 MG/1
150 TABLET ORAL EVERY MORNING
Qty: 30 TABLET | Refills: 5 | Status: SHIPPED | OUTPATIENT
Start: 2025-02-25 | End: 2025-08-24

## 2025-02-25 NOTE — ASSESSMENT & PLAN NOTE
Pt not ready to revisit Sleep medicine at this time. We did discuss Zepbound injections having indications for sleep apnea. Pt will think about this.  
    BMI at 35.78% today w/ comorbidities of HTN, hyperlipidemia, prediabetes and JOSUE. We may note improvement in weight w/ Wellbutrin, however if we do not, patient may benefit from either Zepbound or Wegovy to help manage his weight and comorbidities. Pt would like to think about this and let us know his thoughts at our next OV.        Pt to RTO in 6 weeks for recheck of his anxiety/depression, weight, HTN or sooner PRN.       
  Orders:    CBC and differential; Future    Comprehensive metabolic panel; Future    BP elevated today. This may be in setting of 5 lb weight gain since our last visit and lack of physical exercise. Plan to start Wellbutrin for patient's anxiety/depression. This may have a positive impact in regards to weight reduction for patient. We will have him check BP 2-3x per week and RTO in 6 weeks. If BP >130/90 for majority of readings, consider adding HCTZ to get BP to goal. Patient is encouraged to call our office for any questions/concerns, persistent or worsening symptoms. Patient states they understand and agree with treatment plan.   
  Orders:    Hemoglobin A1C; Future    Check A1c w/ next lab draw.  
  Orders:    Lipid panel; Future    Managed w/ Crestor 20 mg daily. Check lipids and CMP.  
Yes

## 2025-02-25 NOTE — PROGRESS NOTES
Name: Johnny Hernandez      : 1970      MRN: [id number]  Encounter Provider: CHICHI Restrepo  Encounter Date: 2025   Encounter department: [facility]  :  Assessment & Plan  Essential hypertension    Orders:    CBC and differential; Future    Comprehensive metabolic panel; Future    BP elevated today. This may be in setting of 5 lb weight gain since our last visit and lack of physical exercise. Plan to start Wellbutrin for patient's anxiety/depression. This may have a positive impact in regards to weight reduction for patient. We will have him check BP 2-3x per week and RTO in 6 weeks. If BP >130/90 for majority of readings, consider adding HCTZ to get BP to goal. Patient is encouraged to call our office for any questions/concerns, persistent or worsening symptoms. Patient states they understand and agree with treatment plan.   Mixed hyperlipidemia    Orders:    Lipid panel; Future    Managed w/ Crestor 20 mg daily. Check lipids and CMP.  Hypothyroidism, unspecified type    Orders:    TSH, 3rd generation with Free T4 reflex; Future    Check TSH w/ next lab draw. Currently managed w/ Levothyroxine 75 mcg daily. Continue medication.  Prediabetes    Orders:    Hemoglobin A1C; Future    Check A1c w/ next lab draw.  Screening for blood or protein in urine    Orders:    UA (URINE) with reflex to Scope; Future    Anxiety and depression      Orders:    buPROPion (WELLBUTRIN XL) 150 mg 24 hr tablet; Take 1 tablet (150 mg total) by mouth every morning    Plan to add Wellbutrin in addition to Buspar to help anxiety/depression symptoms. This may also positively impact patient's weight. Plan to have patient RTO in 6 weeks for recheck or sooner PRN.  Colon cancer screening    Orders:    Ambulatory Referral to Gastroenterology; Future    Family history of coronary artery disease    Orders:    CT coronary calcium score; Future    2 negative stress tests in 2018 &  2020. Pt denies any cardiac symptoms. Check coronary calcium score to determine how aggressive we need to be with his cholesterol mgmt. If score is elevated, consider evaluation w/ Cardiology.  Sleep apnea, unspecified type       Pt not ready to revisit Sleep medicine at this time. We did discuss Zepbound injections having indications for sleep apnea. Pt will think about this.  Severe obesity (BMI 35.0-39.9) with comorbidity (HCC)      BMI at 35.78% today w/ comorbidities of HTN, hyperlipidemia, prediabetes and JOSUE. We may note improvement in weight w/ Wellbutrin, however if we do not, patient may benefit from either Zepbound or Wegovy to help manage his weight and comorbidities. Pt would like to think about this and let us know his thoughts at our next OV.        Pt to RTO in 6 weeks for recheck of his anxiety/depression, weight, HTN or sooner PRN.             Depression Screening and Follow-up Plan: Patient was screened for depression during today's encounter. They screened negative with a PHQ-2 score of 0.        History of Present Illness   Pt presents today for recheck of his BP.  He notes that he has been struggling to find motivation and energy to work out.  He has gained about 5 lbs since our last visit in August.  Pt does admit to feeling slightly depressed from work stressors.  He is currently on Buspar for his anxiety.  He is open to trialling medication for his depression, but is leary of trying anything that will make him gain wait.  He does have hx of sleep apnea, however does not wear a CPAP because he frequently turns from side to side in his sleep.  Pt does not want to see Sleep medicine again at this time.  Additionally, pt does have hx of CAD in his father.  Pt had 2 negative stress tests in 2018 and 2020.  Denies cardiac symptoms.  Pt is unsure if he should see Cardiology given family hx.        Review of Systems  As noted per HPI.  Objective   /83   Pulse 82   Temp 97.6 °F (36.4 °C)    Resp 15   Ht 6' (1.829 m)   Wt 120 kg (263 lb 12.8 oz)   SpO2 97%   BMI 35.78 kg/m²      Physical Exam  Vitals reviewed.   Constitutional:       Appearance: Normal appearance.   Cardiovascular:      Rate and Rhythm: Normal rate and regular rhythm.      Pulses: Normal pulses.      Heart sounds: Normal heart sounds.   Pulmonary:      Effort: Pulmonary effort is normal.      Breath sounds: Normal breath sounds.   Neurological:      Mental Status: He is alert and oriented to person, place, and time. Mental status is at baseline.   Psychiatric:         Mood and Affect: Mood normal.         Behavior: Behavior normal.         Thought Content: Thought content normal.         Judgment: Judgment normal.

## 2025-02-27 ENCOUNTER — CONSULT (OUTPATIENT)
Dept: DERMATOLOGY | Facility: CLINIC | Age: 55
End: 2025-02-27
Payer: COMMERCIAL

## 2025-02-27 VITALS — BODY MASS INDEX: 35.94 KG/M2 | WEIGHT: 265 LBS | TEMPERATURE: 97.1 F

## 2025-02-27 DIAGNOSIS — R21 RASH: Primary | ICD-10-CM

## 2025-02-27 PROCEDURE — 99204 OFFICE O/P NEW MOD 45 MIN: CPT | Performed by: DERMATOLOGY

## 2025-02-27 RX ORDER — CLOBETASOL PROPIONATE 0.5 MG/G
OINTMENT TOPICAL
Qty: 60 G | Refills: 0 | Status: SHIPPED | OUTPATIENT
Start: 2025-02-27

## 2025-02-27 NOTE — PROGRESS NOTES
"[facility] Note     Patient Name: Johnny Hernandez  Encounter Date: 2/27/2025     Have you been cared for by a [facility] Dermatologist in the last 3 years and, if so, which description applies to you?    NO.   I am considered a \"new\" patient and must complete all patient intake questions. I am MALE/not capable of bearing children.    REVIEW OF SYSTEMS:  Have you recently had or currently have any of the following? Recent fever or chills? No  Any non-healing wound? No   PAST MEDICAL HISTORY:  Have you personally ever had or currently have any of the following?  If \"YES,\" then please provide more detail. Skin cancer (such as Melanoma, Basal Cell Carcinoma, Squamous Cell Carcinoma?  No  Tuberculosis, HIV/AIDS, Hepatitis B or C: No  Radiation Treatment No   HISTORY OF IMMUNOSUPPRESSION:   Do you have a history of any of the following:  Systemic Immunosuppression such as Diabetes, Biologic or Immunotherapy, Chemotherapy, Organ Transplantation, Bone Marrow Transplantation or Prednisone?  No     Answering \"YES\" requires the addition of the dotphrase \"IMMUNOSUPPRESSED\" as the first diagnosis of the patient's visit.   FAMILY HISTORY:  Any \"first degree relatives\" (parent, brother, sister, or child) with the following?    Skin Cancer, Pancreatic or Other Cancer? No   PATIENT EXPERIENCE:    Do you want the Dermatologist to perform a COMPLETE skin exam today including a clinical examination under the \"bra and underwear\" areas?  NO  If necessary, do we have your permission to call and leave a detailed message on your Preferred Phone number that includes your specific medical information?  Yes      Allergies   Allergen Reactions    Ace Inhibitors Cough    Penicillins Other (See Comments)     Other reaction(s): unkown, childhood rxn      Current Outpatient Medications:     albuterol (PROVENTIL HFA,VENTOLIN HFA) 90 mcg/act inhaler, Inhale 2 puffs every 6 (six) hours as needed for wheezing or shortness of " breath, Disp: 18 g, Rfl: 3    buPROPion (WELLBUTRIN XL) 150 mg 24 hr tablet, Take 1 tablet (150 mg total) by mouth every morning, Disp: 30 tablet, Rfl: 5    busPIRone (BUSPAR) 15 mg tablet, TAKE 1 TABLET BY MOUTH TWICE A DAY, Disp: 60 tablet, Rfl: 5    clotrimazole-betamethasone (LOTRISONE) 1-0.05 % cream, Apply topically 2 (two) times a day, Disp: 45 g, Rfl: 1    cyclobenzaprine (FLEXERIL) 10 mg tablet, TAKE 1 TABLET (10 MG TOTAL) BY MOUTH DAILY AT BEDTIME AS NEEDED FOR MUSCLE SPASMS, Disp: 30 tablet, Rfl: 0    fenofibrate (TRICOR) 145 mg tablet, TAKE 1 TABLET BY MOUTH EVERY DAY, Disp: 30 tablet, Rfl: 0    fluconazole (DIFLUCAN) 200 mg tablet, Take 200 mg by mouth once, Disp: , Rfl:     levothyroxine 75 mcg tablet, TAKE 1 TABLET BY MOUTH EVERY DAY, Disp: 30 tablet, Rfl: 5    losartan (COZAAR) 100 MG tablet, Take 1 tablet (100 mg total) by mouth daily, Disp: 90 tablet, Rfl: 3    pantoprazole (PROTONIX) 40 mg tablet, TAKE 1 TABLET BY MOUTH EVERY DAY, Disp: 30 tablet, Rfl: 5    rosuvastatin (CRESTOR) 20 MG tablet, TAKE 1 TABLET BY MOUTH EVERY DAY, Disp: 30 tablet, Rfl: 26    tadalafil (CIALIS) 5 MG tablet, Take 1 tablet (5 mg total) by mouth daily, Disp: 90 tablet, Rfl: 3          Whom besides the patient is providing clinical information about today's encounter?   NO ADDITIONAL HISTORIAN (patient alone provided history)    Physical Exam and Assessment/Plan by Diagnosis:    RASH, suspected Granuloma Annulare vs deep gyrate erythema vs sarcoid vs other    Physical Exam:  (Anatomic Location); (Size and Morphological Description); (Differential Diagnosis):  Left posterior upper arm: poorly defined erythematous papules coalescing into erythematous patches and plaque    Pertinent Negatives: No symptoms    Additional History of Present Condition:  Patient presents as a new patient with rash on left upper arm. Reports rash has been present for 14 months.  Denies any symptoms, no itching, burning, or pain. States rash has  remained the same size since he first noticed it. Patient went to [facility] and was given diflucan 200 mg to take once weekly for 4 weeks, and topical Lotrisone cream to apply twice daily. Patient reports he applied topical for months, saw no improvement with oral medication or topical. Denies any new medications prior to onset of rash.     Assessment and Plan:  Based on a thorough discussion of this condition and the management approach to it (including a comprehensive discussion of the known risks, side effects and potential benefits of treatment), the patient (family) agrees to implement the following specific plan:  Discussed biopsy of rash for a diagnosis versus trial of topical steroid with reevaluation for improvement. Patient would like to trial topical steroid before biopsy.   Apply clobetasol 0.05% ointment twice daily to affected areas for up to three weeks as needed for flaring. If rash does not resolve after three weeks, take a one week break from topical before applying again. Avoid contact with face and groin.   Follow up in 6 weeks. If rash has not improved by follow up will biopsy site.   Patient commits to follow up and will agree to biopsy at next visit if not responding    Scribe Attestation      I,:  Yareli Leal MA am acting as a scribe while in the presence of the attending physician.:       I,:  Dione Candelaria MD personally performed the services described in this documentation    as scribed in my presence.:

## 2025-03-03 ENCOUNTER — HOSPITAL ENCOUNTER (OUTPATIENT)
Dept: CT IMAGING | Facility: HOSPITAL | Age: 55
Discharge: HOME | End: 2025-03-03
Payer: COMMERCIAL

## 2025-03-03 DIAGNOSIS — Z82.49 FAMILY HISTORY OF CORONARY ARTERY DISEASE: ICD-10-CM

## 2025-03-03 PROCEDURE — 75571 CT HRT W/O DYE W/CA TEST: CPT

## 2025-03-09 DIAGNOSIS — E78.2 MIXED HYPERLIPIDEMIA: ICD-10-CM

## 2025-03-10 ENCOUNTER — RESULTS FOLLOW-UP (OUTPATIENT)
Dept: FAMILY MEDICINE CLINIC | Facility: CLINIC | Age: 55
End: 2025-03-10

## 2025-03-10 DIAGNOSIS — I10 ESSENTIAL HYPERTENSION: ICD-10-CM

## 2025-03-10 DIAGNOSIS — E78.2 MIXED HYPERLIPIDEMIA: Primary | ICD-10-CM

## 2025-03-10 DIAGNOSIS — Z82.49 FAMILY HISTORY OF CORONARY ARTERY DISEASE: ICD-10-CM

## 2025-03-10 DIAGNOSIS — R93.1 ELEVATED CORONARY ARTERY CALCIUM SCORE: ICD-10-CM

## 2025-03-10 DIAGNOSIS — E66.01 SEVERE OBESITY (BMI 35.0-39.9) WITH COMORBIDITY (HCC): ICD-10-CM

## 2025-03-10 RX ORDER — FENOFIBRATE 145 MG/1
145 TABLET, COATED ORAL DAILY
Qty: 90 TABLET | Refills: 3 | Status: SHIPPED | OUTPATIENT
Start: 2025-03-10

## 2025-03-13 ENCOUNTER — CONSULT (OUTPATIENT)
Dept: CARDIOLOGY CLINIC | Facility: CLINIC | Age: 55
End: 2025-03-13
Payer: COMMERCIAL

## 2025-03-13 VITALS
OXYGEN SATURATION: 97 % | WEIGHT: 264.8 LBS | SYSTOLIC BLOOD PRESSURE: 124 MMHG | DIASTOLIC BLOOD PRESSURE: 84 MMHG | HEIGHT: 72 IN | HEART RATE: 79 BPM | BODY MASS INDEX: 35.87 KG/M2

## 2025-03-13 DIAGNOSIS — E78.2 MIXED HYPERLIPIDEMIA: ICD-10-CM

## 2025-03-13 DIAGNOSIS — I10 ESSENTIAL HYPERTENSION: ICD-10-CM

## 2025-03-13 DIAGNOSIS — R73.03 PREDIABETES: Primary | ICD-10-CM

## 2025-03-13 DIAGNOSIS — E66.01 SEVERE OBESITY (BMI 35.0-39.9) WITH COMORBIDITY (HCC): Primary | ICD-10-CM

## 2025-03-13 DIAGNOSIS — Z82.49 FAMILY HISTORY OF CORONARY ARTERY DISEASE: ICD-10-CM

## 2025-03-13 DIAGNOSIS — R93.1 ELEVATED CORONARY ARTERY CALCIUM SCORE: ICD-10-CM

## 2025-03-13 DIAGNOSIS — R07.89 OTHER CHEST PAIN: ICD-10-CM

## 2025-03-13 DIAGNOSIS — E66.01 SEVERE OBESITY (BMI 35.0-39.9) WITH COMORBIDITY (HCC): ICD-10-CM

## 2025-03-13 PROCEDURE — 99204 OFFICE O/P NEW MOD 45 MIN: CPT | Performed by: INTERNAL MEDICINE

## 2025-03-13 PROCEDURE — 93000 ELECTROCARDIOGRAM COMPLETE: CPT | Performed by: INTERNAL MEDICINE

## 2025-03-13 RX ORDER — TIRZEPATIDE 7.5 MG/.5ML
7.5 INJECTION, SOLUTION SUBCUTANEOUS WEEKLY
Qty: 2 ML | Refills: 0 | Status: SHIPPED | OUTPATIENT
Start: 2025-05-08 | End: 2025-06-05

## 2025-03-13 RX ORDER — TIRZEPATIDE 10 MG/.5ML
10 INJECTION, SOLUTION SUBCUTANEOUS WEEKLY
Qty: 2 ML | Refills: 0 | Status: SHIPPED | OUTPATIENT
Start: 2025-06-05 | End: 2025-07-03

## 2025-03-13 RX ORDER — TIRZEPATIDE 15 MG/.5ML
15 INJECTION, SOLUTION SUBCUTANEOUS WEEKLY
Qty: 6 ML | Refills: 0 | Status: SHIPPED | OUTPATIENT
Start: 2025-07-31

## 2025-03-13 RX ORDER — TIRZEPATIDE 12.5 MG/.5ML
12.5 INJECTION, SOLUTION SUBCUTANEOUS WEEKLY
Qty: 2 ML | Refills: 0 | Status: SHIPPED | OUTPATIENT
Start: 2025-07-03 | End: 2025-07-31

## 2025-03-13 RX ORDER — TIRZEPATIDE 2.5 MG/.5ML
2.5 INJECTION, SOLUTION SUBCUTANEOUS WEEKLY
Qty: 2 ML | Refills: 0 | Status: SHIPPED | OUTPATIENT
Start: 2025-03-13

## 2025-03-13 RX ORDER — TIRZEPATIDE 5 MG/.5ML
5 INJECTION, SOLUTION SUBCUTANEOUS WEEKLY
Qty: 2 ML | Refills: 0 | Status: SHIPPED | OUTPATIENT
Start: 2025-04-10 | End: 2025-05-08

## 2025-03-13 NOTE — PROGRESS NOTES
Cardiology Consultation     Johnny Hernandez  [id number]  1970  [facility] [place]  [address]  [address]  [place] [address]    Assessment & Plan  Mixed hyperlipidemia  Currently continued on fenofibrate for treatment of elevated triglycerides and Crestor 20 mg daily  LDL noted to be 82 and triglyceride level of 174 in November 2024  Repeat levels ordered in his chart  Essential hypertension  Well-controlled on losartan 100 mg daily  Family history of coronary artery disease  Father with heart disease as well as a brother who had an MI at age 47  Severe obesity (BMI 35.0-39.9) with comorbidity (HCC)  Weight management recommended  Elevated coronary artery calcium score  Currently continued on statin  Score noted to be 170, with the 112 in the right coronary artery and 57 in the LAD  This places patient at the 86th percentile for age and gender matched individuals  Considering risk factors of age, family history, hypertension, hyperlipidemia, obesity, sleep apnea, prediabetes we will proceed with functional testing with stress test to evaluate for active ischemia in the setting of left-sided chest pain  Also recommended that patient start on a baby aspirin for primary prevention purposes  Prediabetes  Management as per primary care physician  Other chest pain  As above, considering significant risk factors, will proceed with functional testing with stress test  Will also check an echocardiogram to assess for structural heart disease    Chief Complaint   Patient presents with    New Patient Visit     Ref by pcp- Elevated coronary artery calcium score      Chest Pain     Left sided- mild pain .    Ankle Swelling     Bilateral - mostly left    Cough       HPI: Patient is here for reestablishment of cardiac care.  He does have some left-sided chest pain, noted to be mild.  Happens intermittently, at times at rest, no associated symptoms, lasts a few seconds  and goes away.  Also noted to have an elevated coronary artery calcium score as well.  No reported shortness of breath, palpitations, lightheadedness, syncope, LE edema, orthopnea, PND, or significant weight changes.  Patient remains active without any increased fatigue out of the ordinary.        Vitals:    03/13/25 0948   BP: 124/84   BP Location: Left arm   Patient Position: Sitting   Cuff Size: Standard   Pulse: 79   SpO2: 97%   Weight: 120 kg (264 lb 12.8 oz)   Height: 6' (1.829 m)       EKG:  Normal sinus rhythm with sinus arrhythmia  Normal ECG    Review of Systems:  Review of Systems   Constitutional:  Negative for activity change, appetite change, fatigue and fever.   HENT:  Negative for nosebleeds and sore throat.    Eyes:  Negative for photophobia and visual disturbance.   Respiratory:  Negative for cough, chest tightness, shortness of breath and wheezing.    Cardiovascular:  Positive for chest pain. Negative for palpitations and leg swelling.   Gastrointestinal:  Negative for abdominal pain, diarrhea, nausea and vomiting.   Endocrine: Negative for polyuria.   Genitourinary:  Negative for dysuria, frequency and hematuria.   Musculoskeletal:  Negative for arthralgias, back pain and gait problem.   Skin:  Negative for pallor and rash.   Neurological:  Negative for dizziness, syncope, speech difficulty and light-headedness.   Hematological:  Does not bruise/bleed easily.   Psychiatric/Behavioral:  Negative for agitation, behavioral problems and confusion.        Physical Exam:  Physical Exam  Vitals reviewed.   Constitutional:       General: He is not in acute distress.     Appearance: Normal appearance. He is well-developed. He is not diaphoretic.   HENT:      Head: Normocephalic and atraumatic.      Nose: Nose normal.   Eyes:      General: No scleral icterus.     Extraocular Movements: Extraocular movements intact.      Pupils: Pupils are equal, round, and reactive to light.   Neck:      Vascular: No JVD.    Cardiovascular:      Rate and Rhythm: Normal rate and regular rhythm.      Heart sounds: S1 normal and S2 normal. Heart sounds not distant. No murmur heard.     No systolic murmur is present.      No friction rub. No gallop. No S3 sounds.   Pulmonary:      Effort: Pulmonary effort is normal. No respiratory distress.      Breath sounds: Normal breath sounds. No wheezing or rales.   Abdominal:      General: Bowel sounds are normal. There is no distension.      Palpations: Abdomen is soft.   Musculoskeletal:         General: No deformity.      Cervical back: Normal range of motion and neck supple.      Right lower leg: No edema.      Left lower leg: No edema.   Skin:     General: Skin is warm and dry.      Findings: No erythema.   Neurological:      Mental Status: He is alert and oriented to person, place, and time.      Cranial Nerves: No cranial nerve deficit.   Psychiatric:         Mood and Affect: Mood normal.         Behavior: Behavior normal.         Patient Active Problem List   Diagnosis    Adjustment disorder with anxiety    Amblyopia    Chronic pain of right knee    Essential hypertension    Hyperlipidemia    Osseous stenosis of neural canal of cervical region    Prediabetes    Vitamin D deficiency    Other specified hypothyroidism    Urinary frequency    Sleep apnea    Anxiety    Personal history of colonic polyps    Gastroesophageal reflux disease with esophagitis and hemorrhage    Hepatic steatosis    Severe obesity (BMI 35.0-39.9) with comorbidity (HCC)    Other chest pain    Family history of coronary artery disease    Elevated coronary artery calcium score     Past Medical History:   Diagnosis Date    Acute sinusitis 2011    Allergic     Anxiety     Cellulitis 2011    Cellulitis of neck 2010    Colon polyp     Facial cellulitis 2010    Hypercholesterolemia     Hypertension     Lymph nodes enlarged     Sensory disturbance     Sleep apnea     Visual impairment      Social History     Socioeconomic  History    Marital status: /Civil Union     Spouse name: Not on file    Number of children: Not on file    Years of education: Not on file    Highest education level: Not on file   Occupational History    Not on file   Tobacco Use    Smoking status: Never    Smokeless tobacco: Never    Tobacco comments:     DENIED: HISTORY OF TOBACCO USE   Vaping Use    Vaping status: Never Used   Substance and Sexual Activity    Alcohol use: Yes     Alcohol/week: 2.0 standard drinks of alcohol     Types: 2 Cans of beer per week     Comment: SOCIAL; 2 BEERS A DAY AS PER ALL SCRIPTS     Drug use: No    Sexual activity: Yes     Partners: Female     Birth control/protection: Coitus interruptus, None   Other Topics Concern    Not on file   Social History Narrative    Always uses seat belt    Caffeine use: 4 cups of coffee qd    Has smoke detectors     Social Drivers of Health     Financial Resource Strain: Not on file   Food Insecurity: Not on file   Transportation Needs: Not on file   Physical Activity: Not on file   Stress: Not on file   Social Connections: Not on file   Intimate Partner Violence: Not on file   Housing Stability: Not on file      Family History   Problem Relation Age of Onset    Hypertension Mother     Hyperlipidemia Mother     Coronary artery disease Father     Heart disease Father     Heart attack Brother         B/D 47 MI    Leukemia Brother     Leukemia Son     Mental illness Neg Hx     Substance Abuse Neg Hx     Alcohol abuse Neg Hx     Colon polyps Neg Hx     Colon cancer Neg Hx      Past Surgical History:   Procedure Laterality Date    COLONOSCOPY         Current Outpatient Medications:     albuterol (PROVENTIL HFA,VENTOLIN HFA) 90 mcg/act inhaler, Inhale 2 puffs every 6 (six) hours as needed for wheezing or shortness of breath, Disp: 18 g, Rfl: 3    buPROPion (WELLBUTRIN XL) 150 mg 24 hr tablet, Take 1 tablet (150 mg total) by mouth every morning, Disp: 30 tablet, Rfl: 5    busPIRone (BUSPAR) 15 mg  tablet, TAKE 1 TABLET BY MOUTH TWICE A DAY, Disp: 60 tablet, Rfl: 5    clobetasol (TEMOVATE) 0.05 % ointment, Apply clobetasol 0.05% ointment twice daily to affected areas for up to three weeks If rash does not resolve after three weeks, take a one week break from topical before applying again. Avoid contact with face and groin., Disp: 60 g, Rfl: 0    clotrimazole-betamethasone (LOTRISONE) 1-0.05 % cream, Apply topically 2 (two) times a day, Disp: 45 g, Rfl: 1    cyclobenzaprine (FLEXERIL) 10 mg tablet, TAKE 1 TABLET (10 MG TOTAL) BY MOUTH DAILY AT BEDTIME AS NEEDED FOR MUSCLE SPASMS, Disp: 30 tablet, Rfl: 0    fenofibrate (TRICOR) 145 mg tablet, TAKE 1 TABLET BY MOUTH EVERY DAY, Disp: 90 tablet, Rfl: 3    fluconazole (DIFLUCAN) 200 mg tablet, Take 200 mg by mouth once, Disp: , Rfl:     levothyroxine 75 mcg tablet, TAKE 1 TABLET BY MOUTH EVERY DAY, Disp: 30 tablet, Rfl: 5    losartan (COZAAR) 100 MG tablet, Take 1 tablet (100 mg total) by mouth daily, Disp: 90 tablet, Rfl: 3    pantoprazole (PROTONIX) 40 mg tablet, TAKE 1 TABLET BY MOUTH EVERY DAY, Disp: 30 tablet, Rfl: 5    rosuvastatin (CRESTOR) 20 MG tablet, TAKE 1 TABLET BY MOUTH EVERY DAY, Disp: 30 tablet, Rfl: 26    tadalafil (CIALIS) 5 MG tablet, Take 1 tablet (5 mg total) by mouth daily, Disp: 90 tablet, Rfl: 3  Allergies   Allergen Reactions    Ace Inhibitors Cough    Penicillins Other (See Comments)     Other reaction(s): unkown, childhood rxn       Labs:  No visits with results within 6 Month(s) from this visit.   Latest known visit with results is:   Office Visit on 08/21/2024   Component Date Value    Cholesterol, Total 11/13/2024 156     Triglycerides 11/13/2024 174 (H)     HDL 11/13/2024 44     VLDL Cholesterol Calcula* 11/13/2024 30     LDL Calculated 11/13/2024 82     LDl/HDL Ratio 11/13/2024 1.9     Hemoglobin A1C 11/13/2024 6.1 (H)     Estimated Average Glucose 11/13/2024 128     25-HYDROXY VIT D 11/13/2024 38.5     Specific [place] 11/13/2024  1.015     Ph 11/13/2024 7.0     Color UA 11/13/2024 Yellow     Urine Appearance 11/13/2024 Clear     Leukocyte Esterase 11/13/2024 Negative     Protein 11/13/2024 Negative     Glucose, 24 HR Urine 11/13/2024 Negative     Ketone, Urine 11/13/2024 Negative     Blood, Urine 11/13/2024 Negative     Bilirubin, Urine 11/13/2024 Negative     Urobilinogen Urine 11/13/2024 1.0     Nitrites Urine 11/13/2024 Negative     Microscopic Examination 11/13/2024 Comment     Lyme 93 kD IgG 11/13/2024 Absent     Lyme 66 kD IgG 11/13/2024 Absent     Lyme 58 kD IgG 11/13/2024 Absent     Lyme 45 kD IgG 11/13/2024 Absent     Lyme 41 kD IgG 11/13/2024 Absent     Lyme 39 kD IgG 11/13/2024 Absent     Lyme 30 kD IgG 11/13/2024 Absent     Lyme 28 kD IgG 11/13/2024 Absent     Lyme 23 kD IgG 11/13/2024 Absent     Lyme 18 kD IgG 11/13/2024 Absent     Lyme IgG WB Interp. 11/13/2024 Negative     Lyme 41 kD IgM 11/13/2024 Absent     Lyme 39 kD IgM 11/13/2024 Absent     Lyme 23 kD IgM 11/13/2024 Absent     Lyme IgM WB Interp. 11/13/2024 Negative      Lab Results   Component Value Date    CHOL 227 (H) 07/27/2017    TRIG 174 (H) 11/13/2024    HDL 44 11/13/2024    LDLDIRECT 61 04/11/2024     Imaging: CT coronary calcium score  Result Date: 3/8/2025  Narrative: CT CORONARY ARTERY CALCIUM SCREENING WITHOUT INTRAVENOUS CONTRAST INDICATION: Z82.49: Family history of ischemic heart disease and other diseases of the circulatory system. Assess for calcific coronary plaque. Patient Age: 54 years. Patient Gender: Male. COMPARISON: October 20, 2023 TECHNIQUE: Low radiation dose computed tomography of the heart was performed with EKG gating, suspended respiration, and without intravenous contrast. This examination, like all CT scans performed in the [facility], was performed utilizing techniques to minimize radiation dose exposure, including the use of iterative reconstruction and automated exposure control. Radiation dose length product (DLP) for  this visit: 151.93 mGy-cm Postprocessing was performed on a computer workstation to measure the amount of calcium in the coronary arteries. Imaging was limited to the heart and the immediately adjacent lungs. FINDINGS: Coronary artery calcium score breakdown is as follows: Left main coronary artery: 0 Left anterior descending coronary artery and diagonal branches: 57 Left circumflex coronary artery and left marginal branches: 1 Right coronary artery and right marginal branches: 112 Posterior descending coronary artery: 0 TOTAL coronary calcium score: 170 Calcium score PERCENTILE of age, race, and gender matched database participants in the Multi-Ethnic Study of Atherosclerosis (CRABTREE) trial: 86th HEART/GREAT VESSELS: No significant low radiation dose noncontrast CT abnormality of the heart. No aneurysmal dilatation within visualized segments of thoracic aorta. EXTRACARDIAC FINDINGS: Visualized hepatic parenchyma is diffusely decreased in density consistent with hepatic steatosis.     Impression: Total coronary calcium score equals 170. For more useful information regarding the prognostic significance of the calcium score, please consult the calculator at the website http://[email or web address]-nhlbi.[email or web address].aspx. Workstation performed: [id number]

## 2025-03-13 NOTE — ASSESSMENT & PLAN NOTE
Currently continued on statin  Score noted to be 170, with the 112 in the right coronary artery and 57 in the LAD  This places patient at the 86th percentile for age and gender matched individuals  Considering risk factors of age, family history, hypertension, hyperlipidemia, obesity, sleep apnea, prediabetes we will proceed with functional testing with stress test to evaluate for active ischemia in the setting of left-sided chest pain  Also recommended that patient start on a baby aspirin for primary prevention purposes

## 2025-03-13 NOTE — ASSESSMENT & PLAN NOTE
As above, considering significant risk factors, will proceed with functional testing with stress test  Will also check an echocardiogram to assess for structural heart disease

## 2025-03-13 NOTE — ASSESSMENT & PLAN NOTE
Currently continued on fenofibrate for treatment of elevated triglycerides and Crestor 20 mg daily  LDL noted to be 82 and triglyceride level of 174 in November 2024  Repeat levels ordered in his chart

## 2025-03-14 ENCOUNTER — TELEPHONE (OUTPATIENT)
Age: 55
End: 2025-03-14

## 2025-03-14 NOTE — TELEPHONE ENCOUNTER
Received call from prior authorization team for patients insurance. They needed to know additional clinical information. Was providing per PCP notes from visits however then call call disconneted. They said that PA expires tonight.

## 2025-03-14 NOTE — TELEPHONE ENCOUNTER
Elena from PA Dept called to discuss clinical questions regarding Johnny's PA.    Elena wanted to relay that it expires on 3/15/25.    Ref#: [id number]    Please contact Elena/ PA Department at [phone] to answer clinical questions needed.

## 2025-03-14 NOTE — TELEPHONE ENCOUNTER
PA for Zepbound 2.5mg SUBMITTED to [employer]    via    []CMM-KEY:   [x]Surescripts-Case ID #: [id number]   []Availity-Auth ID #  []Faxed to plan   []Other website   []Phone call Case ID #     [x]PA sent as URGENT    All office notes, labs and other pertaining documents and studies sent. Clinical questions answered. Awaiting determination from insurance company.     Turnaround time for your insurance to make a decision on your Prior Authorization can take 7-21 business days.

## 2025-03-17 NOTE — TELEPHONE ENCOUNTER
PA for Zepbound 2.5mg\ DENIED    Reason:(Screenshot if applicable)        Message sent to office clinical pool Yes    Denial letter scanned into Media Yes    Appeal started No (Provider will need to decide if appeal is warranted and send clinical documentation to Prior Authorization Team for initiation.)    **Please follow up with your patient regarding denial and next steps**

## 2025-03-26 ENCOUNTER — TELEPHONE (OUTPATIENT)
Dept: FAMILY MEDICINE CLINIC | Facility: CLINIC | Age: 55
End: 2025-03-26

## 2025-03-26 NOTE — TELEPHONE ENCOUNTER
Per denial, the insurance is asking for a copy of a sleep study to show patient has 15 or more obstructive respiratory events per hours of sleep. They also are requesting proof of use of a CPAP for breathing issues or why patient is not a candidate for a CPAP. I do not see a sleep study scanned into patients chart. Has patient had a sleep study done?

## 2025-03-26 NOTE — TELEPHONE ENCOUNTER
Janel has updated her office note from 2/25/25 with the missing information that they needed. Can you please either resubmit the Prior Auth or do an appeal for this pt for the Zepbound?

## 2025-04-07 DIAGNOSIS — E03.9 HYPOTHYROIDISM, UNSPECIFIED TYPE: ICD-10-CM

## 2025-04-08 RX ORDER — LEVOTHYROXINE SODIUM 75 UG/1
75 TABLET ORAL DAILY
Qty: 30 TABLET | Refills: 5 | Status: SHIPPED | OUTPATIENT
Start: 2025-04-08

## 2025-04-15 ENCOUNTER — HOSPITAL ENCOUNTER (OUTPATIENT)
Dept: NON INVASIVE DIAGNOSTICS | Facility: HOSPITAL | Age: 55
Discharge: HOME | End: 2025-04-15
Attending: INTERNAL MEDICINE
Payer: COMMERCIAL

## 2025-04-15 ENCOUNTER — HOSPITAL ENCOUNTER (OUTPATIENT)
Dept: NUCLEAR MEDICINE | Facility: HOSPITAL | Age: 55
Discharge: HOME | End: 2025-04-15
Attending: INTERNAL MEDICINE
Payer: COMMERCIAL

## 2025-04-15 ENCOUNTER — OFFICE VISIT (OUTPATIENT)
Dept: FAMILY MEDICINE CLINIC | Facility: CLINIC | Age: 55
End: 2025-04-15
Payer: COMMERCIAL

## 2025-04-15 VITALS — OXYGEN SATURATION: 96 % | HEART RATE: 85 BPM | SYSTOLIC BLOOD PRESSURE: 142 MMHG | DIASTOLIC BLOOD PRESSURE: 86 MMHG

## 2025-04-15 VITALS
DIASTOLIC BLOOD PRESSURE: 86 MMHG | WEIGHT: 264.55 LBS | HEIGHT: 72 IN | SYSTOLIC BLOOD PRESSURE: 142 MMHG | BODY MASS INDEX: 35.83 KG/M2

## 2025-04-15 DIAGNOSIS — R07.89 OTHER CHEST PAIN: ICD-10-CM

## 2025-04-15 DIAGNOSIS — I10 ESSENTIAL HYPERTENSION: ICD-10-CM

## 2025-04-15 DIAGNOSIS — R73.03 PREDIABETES: ICD-10-CM

## 2025-04-15 DIAGNOSIS — E66.01 SEVERE OBESITY (BMI 35.0-39.9) WITH COMORBIDITY (HCC): ICD-10-CM

## 2025-04-15 DIAGNOSIS — I10 ESSENTIAL HYPERTENSION: Primary | ICD-10-CM

## 2025-04-15 DIAGNOSIS — E78.2 MIXED HYPERLIPIDEMIA: ICD-10-CM

## 2025-04-15 DIAGNOSIS — Z82.49 FAMILY HISTORY OF CORONARY ARTERY DISEASE: ICD-10-CM

## 2025-04-15 DIAGNOSIS — R93.1 ELEVATED CORONARY ARTERY CALCIUM SCORE: ICD-10-CM

## 2025-04-15 LAB
AORTIC ROOT: 3.5 CM
AV LVOT MEAN GRADIENT: 1 MMHG
AV LVOT PEAK GRADIENT: 2 MMHG
DOP CALC LVOT PEAK VEL VTI: 14.32 CM
DOP CALC LVOT PEAK VEL: 0.64 M/S
E WAVE DECELERATION TIME: 195 MS
E/A RATIO: 0.84
FRACTIONAL SHORTENING: 31 (ref 28–44)
INTERVENTRICULAR SEPTUM IN DIASTOLE (PARASTERNAL SHORT AXIS VIEW): 1.1 CM
INTERVENTRICULAR SEPTUM: 1.1 CM (ref 0.6–1.1)
LAAS-AP2: 19.2 CM2
LAAS-AP4: 19.7 CM2
LEFT ATRIUM SIZE: 4.2 CM
LEFT ATRIUM VOLUME (MOD BIPLANE): 23 ML
LEFT ATRIUM VOLUME INDEX (MOD BIPLANE): 9.6 ML/M2
LEFT INTERNAL DIMENSION IN SYSTOLE: 3.3 CM (ref 2.1–4)
LEFT VENTRICLE DIASTOLIC VOLUME (MOD BIPLANE): 49 ML
LEFT VENTRICLE SYSTOLIC VOLUME (MOD BIPLANE): 21 ML
LEFT VENTRICULAR INTERNAL DIMENSION IN DIASTOLE: 4.8 CM (ref 3.5–6)
LEFT VENTRICULAR POSTERIOR WALL IN END DIASTOLE: 1.4 CM
LEFT VENTRICULAR STROKE VOLUME: 64 ML
LV EF BIPLANE MOD: 57 %
LV EF US.2D.A4C+ESTIMATED: 67 %
LVSV (TEICH): 64 ML
MV E'TISSUE VEL-LAT: 10 CM/S
MV E'TISSUE VEL-SEP: 6 CM/S
MV PEAK A VEL: 0.76 M/S
MV PEAK E VEL: 64 CM/S
MV STENOSIS PRESSURE HALF TIME: 57 MS
MV VALVE AREA P 1/2 METHOD: 3.86
RIGHT ATRIUM AREA SYSTOLE A4C: 14.5 CM2
RIGHT VENTRICLE ID DIMENSION: 3.9 CM
SL CV LEFT ATRIUM LENGTH A2C: 2.8 CM
SL CV LV EF: 57
SL CV PED ECHO LEFT VENTRICLE DIASTOLIC VOLUME (MOD BIPLANE) 2D: 108 ML
SL CV PED ECHO LEFT VENTRICLE SYSTOLIC VOLUME (MOD BIPLANE) 2D: 44 ML
TRICUSPID ANNULAR PLANE SYSTOLIC EXCURSION: 2.2 CM

## 2025-04-15 PROCEDURE — 93018 CV STRESS TEST I&R ONLY: CPT | Performed by: INTERNAL MEDICINE

## 2025-04-15 PROCEDURE — 93016 CV STRESS TEST SUPVJ ONLY: CPT | Performed by: INTERNAL MEDICINE

## 2025-04-15 PROCEDURE — 78452 HT MUSCLE IMAGE SPECT MULT: CPT | Performed by: INTERNAL MEDICINE

## 2025-04-15 PROCEDURE — 78452 HT MUSCLE IMAGE SPECT MULT: CPT

## 2025-04-15 PROCEDURE — 93306 TTE W/DOPPLER COMPLETE: CPT | Performed by: INTERNAL MEDICINE

## 2025-04-15 PROCEDURE — 99213 OFFICE O/P EST LOW 20 MIN: CPT | Performed by: NURSE PRACTITIONER

## 2025-04-15 PROCEDURE — 93306 TTE W/DOPPLER COMPLETE: CPT

## 2025-04-15 PROCEDURE — 93017 CV STRESS TEST TRACING ONLY: CPT

## 2025-04-15 PROCEDURE — A9502 TC99M TETROFOSMIN: HCPCS

## 2025-04-15 RX ORDER — HYDROCHLOROTHIAZIDE 25 MG/1
25 TABLET ORAL DAILY
Qty: 30 TABLET | Refills: 1 | Status: SHIPPED | OUTPATIENT
Start: 2025-04-15

## 2025-04-15 NOTE — PROGRESS NOTES
Name: Johnny Hernandez      : 1970      MRN: [id number]  Encounter Provider: CHICHI Restrepo  Encounter Date: 4/15/2025   Encounter department: [facility]  :  Assessment & Plan  Essential hypertension    Orders:    hydroCHLOROthiazide 25 mg tablet; Take 1 tablet (25 mg total) by mouth daily    Plan to continue Losartan 100 mg daily and add HCTZ 25 mg daily. Pt to check BP 2-3x per week and RTO in 6 weeks for BP recheck and annual physical or sooner PRN. Patient is encouraged to call our office for any questions/concerns, persistent or worsening symptoms. Patient states they understand and agree with treatment plan.   Severe obesity (BMI 35.0-39.9) with comorbidity (HCC)         Still awaiting appeal results for Zepbound.      Pt to RTO in 6 weeks for BP recheck and annual physical or sooner PRN.         History of Present Illness   Pt presents today for 6 week BP recheck.  We are still in the process of an appeal for his Zepbound.  He has not been checking his BP regularly.      Review of Systems  As noted per HPI.  Objective   /86   Pulse 85   SpO2 96%      Physical Exam  Vitals reviewed.   Constitutional:       Appearance: Normal appearance.   Cardiovascular:      Rate and Rhythm: Normal rate and regular rhythm.      Pulses: Normal pulses.      Heart sounds: Normal heart sounds.   Pulmonary:      Effort: Pulmonary effort is normal.      Breath sounds: Normal breath sounds.   Neurological:      Mental Status: He is alert and oriented to person, place, and time. Mental status is at baseline.   Psychiatric:         Mood and Affect: Mood normal.         Behavior: Behavior normal.         Thought Content: Thought content normal.         Judgment: Judgment normal.

## 2025-04-15 NOTE — ASSESSMENT & PLAN NOTE
Still awaiting appeal results for Zepbound.      Pt to RTO in 6 weeks for BP recheck and annual physical or sooner PRN.

## 2025-04-15 NOTE — ASSESSMENT & PLAN NOTE
Orders:    hydroCHLOROthiazide 25 mg tablet; Take 1 tablet (25 mg total) by mouth daily    Plan to continue Losartan 100 mg daily and add HCTZ 25 mg daily. Pt to check BP 2-3x per week and RTO in 6 weeks for BP recheck and annual physical or sooner PRN. Patient is encouraged to call our office for any questions/concerns, persistent or worsening symptoms. Patient states they understand and agree with treatment plan.

## 2025-04-16 LAB
CHEST PAIN STATEMENT: NORMAL
MAX DIASTOLIC BP: 88 MMHG
MAX HR PERCENT: 98 %
MAX HR: 162 BPM
MAX PREDICTED HEART RATE: 165 BPM
PROTOCOL NAME: NORMAL
RATE PRESSURE PRODUCT: NORMAL
REASON FOR TERMINATION: NORMAL
SL CV REST NUCLEAR ISOTOPE DOSE: 15.9 MCI
SL CV STRESS NUCLEAR ISOTOPE DOSE: 46.8 MCI
SL CV STRESS STAGE REACHED: 3
SPECT HRT GATED+EF W RNC IV: 71 %
STRESS ANGINA INDEX: 0
STRESS BASELINE HR: 83 BPM
STRESS PEAK HR: 162 BPM
STRESS POST ESTIMATED WORKLOAD: 10.1 METS
STRESS POST EXERCISE DUR MIN: 7 MIN
STRESS POST EXERCISE DUR MIN: 7 MIN
STRESS POST EXERCISE DUR SEC: 18 SEC
STRESS POST EXERCISE DUR SEC: 18 SEC
STRESS POST PEAK BP: 200 MMHG
STRESS POST PEAK HR: 162 BPM
STRESS POST PEAK SYSTOLIC BP: 200 MMHG
STRESS/REST PERFUSION RATIO: 0.98
TARGET HR FORMULA: NORMAL
TEST INDICATION: NORMAL

## 2025-04-17 ENCOUNTER — OFFICE VISIT (OUTPATIENT)
Dept: DERMATOLOGY | Facility: CLINIC | Age: 55
End: 2025-04-17
Payer: COMMERCIAL

## 2025-04-17 DIAGNOSIS — R21 RASH: Primary | ICD-10-CM

## 2025-04-17 DIAGNOSIS — L92.0 GRANULOMA ANNULARE: ICD-10-CM

## 2025-04-17 PROCEDURE — 99214 OFFICE O/P EST MOD 30 MIN: CPT

## 2025-04-17 RX ORDER — CLOBETASOL PROPIONATE 0.5 MG/G
CREAM TOPICAL
Qty: 60 G | Refills: 1 | Status: SHIPPED | OUTPATIENT
Start: 2025-04-17

## 2025-04-17 NOTE — PROGRESS NOTES
"[facility] Dermatology Clinic Note     Patient Name: Johnny Hernandez  Encounter Date: 4/17/25       Have you been cared for by a [facility] Dermatologist in the last 3 years and, if so, which description applies to you? Yes. I have been here within the last 3 years, and my medical history has NOT changed since that time. I am not of child-bearing potential.     REVIEW OF SYSTEMS:  Have you recently had or currently have any of the following? No changes in my recent health.   PAST MEDICAL HISTORY:  Have you personally ever had or currently have any of the following?  If \"YES,\" then please provide more detail. No changes in my medical history.   HISTORY OF IMMUNOSUPPRESSION: Do you have a history of any of the following:  Systemic Immunosuppression such as Diabetes, Biologic or Immunotherapy, Chemotherapy, Organ Transplantation, Bone Marrow Transplantation or Prednisone?  No     Answering \"YES\" requires the addition of the dotphrase \"IMMUNOSUPPRESSED\" as the first diagnosis of the patient's visit.   FAMILY HISTORY:  Any \"first degree relatives\" (parent, brother, sister, or child) with the following?    No changes in my family's known health.   PATIENT EXPERIENCE:    Do you want the Dermatologist to perform a COMPLETE skin exam today including a clinical examination under the \"bra and underwear\" areas?  NO  If necessary, do we have your permission to call and leave a detailed message on your Preferred Phone number that includes your specific medical information?  Yes      Allergies   Allergen Reactions    Ace Inhibitors Cough    Penicillins Other (See Comments)     Other reaction(s): unkown, childhood rxn      Current Outpatient Medications:     albuterol (PROVENTIL HFA,VENTOLIN HFA) 90 mcg/act inhaler, Inhale 2 puffs every 6 (six) hours as needed for wheezing or shortness of breath, Disp: 18 g, Rfl: 3    buPROPion (WELLBUTRIN XL) 150 mg 24 hr tablet, Take 1 tablet (150 mg total) by mouth every morning, Disp: 30 " tablet, Rfl: 5    busPIRone (BUSPAR) 15 mg tablet, TAKE 1 TABLET BY MOUTH TWICE A DAY, Disp: 60 tablet, Rfl: 5    clobetasol (TEMOVATE) 0.05 % ointment, Apply clobetasol 0.05% ointment twice daily to affected areas for up to three weeks If rash does not resolve after three weeks, take a one week break from topical before applying again. Avoid contact with face and groin., Disp: 60 g, Rfl: 0    clotrimazole-betamethasone (LOTRISONE) 1-0.05 % cream, Apply topically 2 (two) times a day, Disp: 45 g, Rfl: 1    cyclobenzaprine (FLEXERIL) 10 mg tablet, TAKE 1 TABLET (10 MG TOTAL) BY MOUTH DAILY AT BEDTIME AS NEEDED FOR MUSCLE SPASMS, Disp: 30 tablet, Rfl: 0    fenofibrate (TRICOR) 145 mg tablet, TAKE 1 TABLET BY MOUTH EVERY DAY, Disp: 90 tablet, Rfl: 3    fluconazole (DIFLUCAN) 200 mg tablet, Take 200 mg by mouth once (Patient not taking: Reported on 4/15/2025), Disp: , Rfl:     hydroCHLOROthiazide 25 mg tablet, Take 1 tablet (25 mg total) by mouth daily, Disp: 30 tablet, Rfl: 1    levothyroxine 75 mcg tablet, TAKE 1 TABLET BY MOUTH EVERY DAY, Disp: 30 tablet, Rfl: 5    losartan (COZAAR) 100 MG tablet, Take 1 tablet (100 mg total) by mouth daily, Disp: 90 tablet, Rfl: 3    pantoprazole (PROTONIX) 40 mg tablet, TAKE 1 TABLET BY MOUTH EVERY DAY, Disp: 30 tablet, Rfl: 5    rosuvastatin (CRESTOR) 20 MG tablet, TAKE 1 TABLET BY MOUTH EVERY DAY, Disp: 30 tablet, Rfl: 26    tadalafil (CIALIS) 5 MG tablet, Take 1 tablet (5 mg total) by mouth daily, Disp: 90 tablet, Rfl: 3    [START ON 6/5/2025] Zepbound 10 MG/0.5ML auto-injector, Inject 0.5 mL (10 mg total) under the skin once a week for 28 days Do not start before June 5, 2025. (Patient not taking: Reported on 4/15/2025 Do not start before June 5, 2025.), Disp: 2 mL, Rfl: 0    [START ON 7/3/2025] Zepbound 12.5 MG/0.5ML auto-injector, Inject 0.5 mL (12.5 mg total) under the skin once a week for 28 days Do not start before July 3, 2025. (Patient not taking: Reported on 4/15/2025 Do  not start before July 3, 2025.), Disp: 2 mL, Rfl: 0    [START ON 7/31/2025] Zepbound 15 MG/0.5ML auto-injector, Inject 0.5 mL (15 mg total) under the skin once a week Do not start before July 31, 2025. (Patient not taking: Reported on 4/15/2025 Do not start before July 31, 2025.), Disp: 6 mL, Rfl: 0    Zepbound 2.5 MG/0.5ML auto-injector, Inject 0.5 mL (2.5 mg total) under the skin once a week (Patient not taking: Reported on 4/15/2025), Disp: 2 mL, Rfl: 0    Zepbound 5 MG/0.5ML auto-injector, Inject 0.5 mL (5 mg total) under the skin once a week for 28 days Do not start before April 10, 2025. (Patient not taking: Reported on 4/15/2025), Disp: 2 mL, Rfl: 0    [START ON 5/8/2025] Zepbound 7.5 MG/0.5ML auto-injector, Inject 0.5 mL (7.5 mg total) under the skin once a week for 28 days Do not start before May 8, 2025. (Patient not taking: Reported on 4/15/2025 Do not start before May 8, 2025.), Disp: 2 mL, Rfl: 0              Whom besides the patient is providing clinical information about today's encounter?   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 He does not like the consistency so he has not been applying it as much as he should. He further notes he forgets to apply it. Patient would like to know if he can use a cream. Patient is pre-diabetic.     Assessment and Plan:  Based on a thorough discussion of this condition and the management approach to it (including a comprehensive discussion of the known risks, side effects and potential benefits of treatment), the patient (family) agrees to implement the following specific plan:  Patient  defers biopsy today.  Switch from Clobetasol ointment to the Clobetasol 0.05% cream- apply twice a day for 1 week as needed.   This condition is associated with diabetes. Recommend weight loss.   Follow-up: 6 months.         Scribe Attestation      I,:  Jace Romo am acting as a scribe while in the presence of the attending physician.:       I,:  Jimena Armas PA-C personally performed the services described in this documentation    as scribed in my presence.:

## 2025-04-30 ENCOUNTER — OFFICE VISIT (OUTPATIENT)
Dept: CARDIOLOGY CLINIC | Facility: CLINIC | Age: 55
End: 2025-04-30
Payer: COMMERCIAL

## 2025-04-30 VITALS
SYSTOLIC BLOOD PRESSURE: 128 MMHG | WEIGHT: 268 LBS | HEIGHT: 72 IN | HEART RATE: 84 BPM | OXYGEN SATURATION: 98 % | DIASTOLIC BLOOD PRESSURE: 72 MMHG | BODY MASS INDEX: 36.3 KG/M2 | TEMPERATURE: 97.2 F

## 2025-04-30 DIAGNOSIS — E78.2 MIXED HYPERLIPIDEMIA: ICD-10-CM

## 2025-04-30 DIAGNOSIS — R93.1 ELEVATED CORONARY ARTERY CALCIUM SCORE: ICD-10-CM

## 2025-04-30 DIAGNOSIS — I10 ESSENTIAL HYPERTENSION: ICD-10-CM

## 2025-04-30 DIAGNOSIS — Z82.49 FAMILY HISTORY OF CORONARY ARTERY DISEASE: ICD-10-CM

## 2025-04-30 PROCEDURE — 99214 OFFICE O/P EST MOD 30 MIN: CPT

## 2025-04-30 RX ORDER — ASPIRIN 81 MG/1
81 TABLET, CHEWABLE ORAL DAILY
COMMUNITY

## 2025-04-30 NOTE — PROGRESS NOTES
Johnny Hernandez  1970  [id number]  [facility] [address] [facility] [address] [phone]    1. Essential hypertension        2. Elevated coronary artery calcium score        3. Mixed hyperlipidemia        4. Family history of coronary artery disease            Summary/Discussion:  Hypertension:  - 128/72  - continue present medication regimen  - lifestyle modification   - close blood pressure monitoring     Dyslipidemia:  - Lipid Profile:    Latest Reference Range & Units 11/13/24 09:22   Cholesterol 100 - 199 mg/dL 156   Triglycerides 0 - 149 mg/dL 174 (H)   HDL >39 mg/dL 44   LDL Calculated 0 - 99 mg/dL 82   LDL/HDL RATIO 0.0 - 3.6 ratio 1.9   VLDL Cholesterol Dae 5 - 40 mg/dL 30   - continue rosuvastatin 20 mg daily and fenofibrate 145 mg daily   - PCP follows in which he has an active order to repeat lipids   - encouraged low cholesterol diet and annual lipid follow up    Coronary artery calcification seen on CT:  - CT coronary calcium score (3/2025): 170. Calcium localized to the left anterior descending coronary artery and diagonal branches, left circumflex coronary artery and left marginal branches and right coronary artery and right marginal branches  - NM stress test (4/2024): no evidence of stress induced myocardial ischemia  - echo (4/2025): normal LV, LVEF 57%, no WMA, normal diastolic function, normal RV, no significant valvular disease   - currently without symptoms of angina  - continue aspirin, statin and fenofibrate     Interval History: Johnny Hernandez is a 54 y.o. year old male with history mentioned in problem list who presents to the office today for a follow up.     Since his last office visit he has been overall feeling well from a cardiac standpoint. He denies any chest pain/pressure/discomfort or shortness of breath. He denies lower extremity edema, orthopnea, and PND. He denies lightheadedness, dizziness, and syncope. He denies  palpitations. He denies any recent change to his functional capacity.       No further cardiac testing indicated at this time.     He will RTO in 6-9 months with Dr. Davison or sooner if necessary. He will call with any concerns.         Medical Problems       Problem List       Adjustment disorder with anxiety    Amblyopia    Chronic pain of right knee    Essential hypertension    Overview Signed 4/17/2018 11:07 AM by CHICHI Knox   Procedure/Onset: 01/17/2012; Description: great with the lisinopril         Hyperlipidemia    Overview Signed 4/17/2018 11:07 AM by CHICHI Knox   Procedure/Onset: 01/17/2012; Description: change the simvastatin to tricor. charles llipids in 3 months         Osseous stenosis of neural canal of cervical region    Prediabetes    Overview Signed 4/17/2018 11:07 AM by CHICHI Knox   Transitioned From: Impaired fasting glucose         Vitamin D deficiency    Other specified hypothyroidism    Urinary frequency    Sleep apnea    Anxiety    Personal history of colonic polyps    Gastroesophageal reflux disease with esophagitis and hemorrhage    Hepatic steatosis    Severe obesity (BMI 35.0-39.9) with comorbidity (HCC)    Other chest pain    Family history of coronary artery disease    Elevated coronary artery calcium score        Past Medical History:   Diagnosis Date    Acute sinusitis 2011    Allergic     Anxiety     Cellulitis 2011    Cellulitis of neck 2010    Colon polyp     Facial cellulitis 2010    Hypercholesterolemia     Hypertension     Lymph nodes enlarged     Sensory disturbance     Sleep apnea     Visual impairment      Social History     Socioeconomic History    Marital status: /Civil Union     Spouse name: Not on file    Number of children: Not on file    Years of education: Not on file    Highest education level: Not on file   Occupational History    Not on file   Tobacco Use    Smoking status: Never    Smokeless tobacco: Never    Tobacco comments:     DENIED:  HISTORY OF TOBACCO USE   Vaping Use    Vaping status: Never Used   Substance and Sexual Activity    Alcohol use: Yes     Alcohol/week: 2.0 standard drinks of alcohol     Types: 2 Cans of beer per week     Comment: SOCIAL; 2 BEERS A DAY AS PER ALL SCRIPTS     Drug use: No    Sexual activity: Yes     Partners: Female     Birth control/protection: Coitus interruptus, None   Other Topics Concern    Not on file   Social History Narrative    Always uses seat belt    Caffeine use: 4 cups of coffee qd    Has smoke detectors     Social Drivers of Health     Financial Resource Strain: Not on file   Food Insecurity: Not on file   Transportation Needs: Not on file   Physical Activity: Not on file   Stress: Not on file   Social Connections: Not on file   Intimate Partner Violence: Not on file   Housing Stability: Not on file      Family History   Problem Relation Age of Onset    Hypertension Mother     Hyperlipidemia Mother     Coronary artery disease Father     Heart disease Father     Heart attack Brother         B/D 47 MI    Leukemia Brother     Leukemia Son     Mental illness Neg Hx     Substance Abuse Neg Hx     Alcohol abuse Neg Hx     Colon polyps Neg Hx     Colon cancer Neg Hx      Past Surgical History:   Procedure Laterality Date    COLONOSCOPY         Current Outpatient Medications:     albuterol (PROVENTIL HFA,VENTOLIN HFA) 90 mcg/act inhaler, Inhale 2 puffs every 6 (six) hours as needed for wheezing or shortness of breath, Disp: 18 g, Rfl: 3    aspirin 81 mg chewable tablet, Chew 81 mg daily, Disp: , Rfl:     buPROPion (WELLBUTRIN XL) 150 mg 24 hr tablet, Take 1 tablet (150 mg total) by mouth every morning, Disp: 30 tablet, Rfl: 5    busPIRone (BUSPAR) 15 mg tablet, TAKE 1 TABLET BY MOUTH TWICE A DAY, Disp: 60 tablet, Rfl: 5    clobetasol (TEMOVATE) 0.05 % cream, Apply twice a day for 1 week, then take a 1 week break and use as needed., Disp: 60 g, Rfl: 1    cyclobenzaprine (FLEXERIL) 10 mg tablet, TAKE 1 TABLET (10  MG TOTAL) BY MOUTH DAILY AT BEDTIME AS NEEDED FOR MUSCLE SPASMS, Disp: 30 tablet, Rfl: 0    fenofibrate (TRICOR) 145 mg tablet, TAKE 1 TABLET BY MOUTH EVERY DAY, Disp: 90 tablet, Rfl: 3    fluconazole (DIFLUCAN) 200 mg tablet, Take 200 mg by mouth once, Disp: , Rfl:     hydroCHLOROthiazide 25 mg tablet, Take 1 tablet (25 mg total) by mouth daily, Disp: 30 tablet, Rfl: 1    levothyroxine 75 mcg tablet, TAKE 1 TABLET BY MOUTH EVERY DAY, Disp: 30 tablet, Rfl: 5    losartan (COZAAR) 100 MG tablet, Take 1 tablet (100 mg total) by mouth daily, Disp: 90 tablet, Rfl: 3    pantoprazole (PROTONIX) 40 mg tablet, TAKE 1 TABLET BY MOUTH EVERY DAY, Disp: 30 tablet, Rfl: 5    rosuvastatin (CRESTOR) 20 MG tablet, TAKE 1 TABLET BY MOUTH EVERY DAY, Disp: 30 tablet, Rfl: 26    tadalafil (CIALIS) 5 MG tablet, Take 1 tablet (5 mg total) by mouth daily, Disp: 90 tablet, Rfl: 3    clotrimazole-betamethasone (LOTRISONE) 1-0.05 % cream, Apply topically 2 (two) times a day (Patient not taking: Reported on 4/30/2025), Disp: 45 g, Rfl: 1    [START ON 6/5/2025] Zepbound 10 MG/0.5ML auto-injector, Inject 0.5 mL (10 mg total) under the skin once a week for 28 days Do not start before June 5, 2025. (Patient not taking: Reported on 4/15/2025 Do not start before June 5, 2025.), Disp: 2 mL, Rfl: 0    [START ON 7/3/2025] Zepbound 12.5 MG/0.5ML auto-injector, Inject 0.5 mL (12.5 mg total) under the skin once a week for 28 days Do not start before July 3, 2025. (Patient not taking: Reported on 4/15/2025 Do not start before July 3, 2025.), Disp: 2 mL, Rfl: 0    [START ON 7/31/2025] Zepbound 15 MG/0.5ML auto-injector, Inject 0.5 mL (15 mg total) under the skin once a week Do not start before July 31, 2025. (Patient not taking: Reported on 4/15/2025 Do not start before July 31, 2025.), Disp: 6 mL, Rfl: 0    Zepbound 2.5 MG/0.5ML auto-injector, Inject 0.5 mL (2.5 mg total) under the skin once a week (Patient not taking: Reported on 4/15/2025), Disp: 2 mL,  Rfl: 0    Zepbound 5 MG/0.5ML auto-injector, Inject 0.5 mL (5 mg total) under the skin once a week for 28 days Do not start before April 10, 2025. (Patient not taking: Reported on 4/15/2025), Disp: 2 mL, Rfl: 0    [START ON 5/8/2025] Zepbound 7.5 MG/0.5ML auto-injector, Inject 0.5 mL (7.5 mg total) under the skin once a week for 28 days Do not start before May 8, 2025. (Patient not taking: Reported on 4/15/2025 Do not start before May 8, 2025.), Disp: 2 mL, Rfl: 0  Allergies   Allergen Reactions    Ace Inhibitors Cough    Penicillins Other (See Comments)     Other reaction(s): unkown, childhood rxn       Labs:     Chemistry        Component Value Date/Time     07/27/2017 0858    K 4.9 02/14/2024 0730     02/14/2024 0730    CO2 23 02/14/2024 0730    BUN 15 02/14/2024 0730    CREATININE 0.93 02/14/2024 0730    CREATININE 1.02 07/27/2017 0858        Component Value Date/Time    CALCIUM 9.6 07/27/2017 0858    ALKPHOS 63 07/27/2017 0858    AST 19 06/20/2024 0926    ALT 26 06/20/2024 0926    BILITOT 1.0 07/27/2017 0858            Lab Results   Component Value Date    CHOL 227 (H) 07/27/2017    CHOL 199 10/04/2016     Lab Results   Component Value Date    HDL 44 11/13/2024    HDL 47 06/20/2024    HDL 41 04/11/2024     Lab Results   Component Value Date    LDLCALC 82 11/13/2024    LDLCALC 79 06/20/2024    LDLCALC 56 04/11/2024     Lab Results   Component Value Date    TRIG 174 (H) 11/13/2024    TRIG 166 (H) 06/20/2024    TRIG 276 (H) 04/11/2024     Lab Results   Component Value Date    CHOLHDL 3.3 06/20/2024    CHOLHDL 3.4 04/11/2024    CHOLHDL 3.3 02/14/2024       Imaging: NM myocardial perfusion spect (stress and/or rest)  Result Date: 4/16/2025  Narrative:   Stress ECG: A Tien protocol stress test was performed. The patient reached stage 3.0of the protocol after exercising for 7 min and 18 sec and had a maximal HR of 162 bpm (98% of MPHR) and 10.1 METS. The patient experienced no angina during the test. The  patient requested the test to be stopped due to dyspnea and fatigue Symptoms ended during recovery.   Stress ECG: No ST deviation is noted. The stress ECG is negative for ischemia after maximal exercise, without reproduction of symptoms.   Stress Function: Left ventricular function post-stress is normal. Stress ejection fraction is 71%.   Perfusion: There is a left ventricular perfusion defect that is small in size with mild reduction in uptake present in the mid to basal inferolateral location(s) that is paradoxical. The defect appears to be an artifact caused by diaphragmatic activity.   Stress Combined Conclusion: The ECG and SPECT imaging portions of the stress study are concordant with no evidence of stress induced myocardial ischemia. There is image artifact, without diagnostic evidence for perfusion abnormality.     Stress strip  Result Date: 4/16/2025  Narrative: Confirmed by UPPER BUCKS, INBASKET ([id number]),  Maryann Jordan ([id number]) on 4/16/2025 8:54:27 AM    Echo complete w/ contrast if indicated  Result Date: 4/15/2025  Narrative:   Left Ventricle: Left ventricular cavity size is normal. Wall thickness is normal. The left ventricular ejection fraction is 57% by biplane measurement. Systolic function is normal. Wall motion is normal. Diastolic function is normal.   Right Ventricle: Right ventricular cavity size is normal. Systolic function is normal.   Left Atrium: The atrium is normal in size.   Right Atrium: The atrium is normal in size.       ECG:  n/a    Review of Systems   Constitutional: Positive for weight gain.   Gastrointestinal:  Positive for bloating (hx of hepatic steatosis).   All other systems reviewed and are negative.      Vitals:    04/30/25 0932   BP: 128/72   Pulse: 84   Temp: (!) 97.2 °F (36.2 °C)   SpO2: 98%     Vitals:    04/30/25 0932   Weight: 122 kg (268 lb)     Height: 6' (182.9 cm)   Body mass index is 36.35 kg/m².    Physical Exam  Vitals and nursing note reviewed.    Constitutional:       General: He is not in acute distress.     Appearance: Normal appearance. He is not ill-appearing.   HENT:      Nose: Nose normal.      Mouth/Throat:      Mouth: Mucous membranes are moist.      Pharynx: Oropharynx is clear.   Cardiovascular:      Rate and Rhythm: Normal rate and regular rhythm.      Pulses: Normal pulses.      Heart sounds: Normal heart sounds. No murmur heard.  Pulmonary:      Effort: Pulmonary effort is normal.      Breath sounds: Normal breath sounds.   Musculoskeletal:         General: Normal range of motion.      Cervical back: Normal range of motion.      Right lower leg: No edema.      Left lower leg: No edema.   Skin:     General: Skin is warm and dry.   Neurological:      Mental Status: He is alert and oriented to person, place, and time.   Psychiatric:         Mood and Affect: Mood normal.         Behavior: Behavior normal.

## 2025-05-06 DIAGNOSIS — E78.2 MIXED HYPERLIPIDEMIA: ICD-10-CM

## 2025-05-06 RX ORDER — ROSUVASTATIN CALCIUM 20 MG/1
20 TABLET, COATED ORAL DAILY
Qty: 30 TABLET | Refills: 5 | Status: SHIPPED | OUTPATIENT
Start: 2025-05-06

## 2025-05-17 LAB
ALBUMIN SERPL-MCNC: 4.6 G/DL (ref 3.8–4.9)
ALP SERPL-CCNC: 45 IU/L (ref 44–121)
ALT SERPL-CCNC: 50 IU/L (ref 0–44)
APPEARANCE UR: CLEAR
AST SERPL-CCNC: 34 IU/L (ref 0–40)
BASOPHILS # BLD AUTO: 0.1 X10E3/UL (ref 0–0.2)
BASOPHILS NFR BLD AUTO: 1 %
BILIRUB SERPL-MCNC: 0.6 MG/DL (ref 0–1.2)
BILIRUB UR QL STRIP: NEGATIVE
BUN SERPL-MCNC: 16 MG/DL (ref 6–24)
BUN/CREAT SERPL: 14 (ref 9–20)
CALCIUM SERPL-MCNC: 9.8 MG/DL (ref 8.7–10.2)
CHLORIDE SERPL-SCNC: 101 MMOL/L (ref 96–106)
CHOLEST SERPL-MCNC: 141 MG/DL (ref 100–199)
CHOLEST/HDLC SERPL: 3.5 RATIO (ref 0–5)
CO2 SERPL-SCNC: 20 MMOL/L (ref 20–29)
COLOR UR: YELLOW
CREAT SERPL-MCNC: 1.12 MG/DL (ref 0.76–1.27)
EGFR: 78 ML/MIN/1.73
EOSINOPHIL # BLD AUTO: 0.2 X10E3/UL (ref 0–0.4)
EOSINOPHIL NFR BLD AUTO: 3 %
ERYTHROCYTE [DISTWIDTH] IN BLOOD BY AUTOMATED COUNT: 12.7 % (ref 11.6–15.4)
EST. AVERAGE GLUCOSE BLD GHB EST-MCNC: 134 MG/DL
GLOBULIN SER-MCNC: 1.8 G/DL (ref 1.5–4.5)
GLUCOSE SERPL-MCNC: 107 MG/DL (ref 70–99)
GLUCOSE UR QL: NEGATIVE
HBA1C MFR BLD: 6.3 % (ref 4.8–5.6)
HCT VFR BLD AUTO: 43.2 % (ref 37.5–51)
HDLC SERPL-MCNC: 40 MG/DL
HGB BLD-MCNC: 14.1 G/DL (ref 13–17.7)
HGB UR QL STRIP: NEGATIVE
IMM GRANULOCYTES # BLD: 0.1 X10E3/UL (ref 0–0.1)
IMM GRANULOCYTES NFR BLD: 1 %
KETONES UR QL STRIP: NEGATIVE
LDLC SERPL CALC-MCNC: 72 MG/DL (ref 0–99)
LEUKOCYTE ESTERASE UR QL STRIP: NEGATIVE
LYMPHOCYTES # BLD AUTO: 2.3 X10E3/UL (ref 0.7–3.1)
LYMPHOCYTES NFR BLD AUTO: 42 %
MCH RBC QN AUTO: 28.8 PG (ref 26.6–33)
MCHC RBC AUTO-ENTMCNC: 32.6 G/DL (ref 31.5–35.7)
MCV RBC AUTO: 88 FL (ref 79–97)
MICRO URNS: NORMAL
MONOCYTES # BLD AUTO: 0.4 X10E3/UL (ref 0.1–0.9)
MONOCYTES NFR BLD AUTO: 7 %
NEUTROPHILS # BLD AUTO: 2.6 X10E3/UL (ref 1.4–7)
NEUTROPHILS NFR BLD AUTO: 46 %
NITRITE UR QL STRIP: NEGATIVE
PH UR STRIP: 7 [PH] (ref 5–7.5)
PLATELET # BLD AUTO: 229 X10E3/UL (ref 150–450)
POTASSIUM SERPL-SCNC: 4.5 MMOL/L (ref 3.5–5.2)
PROT SERPL-MCNC: 6.4 G/DL (ref 6–8.5)
PROT UR QL STRIP: NEGATIVE
PSA FREE MFR SERPL: 20.2 %
PSA FREE SERPL-MCNC: 0.99 NG/ML
PSA SERPL-MCNC: 4.9 NG/ML (ref 0–4)
RBC # BLD AUTO: 4.9 X10E6/UL (ref 4.14–5.8)
SL AMB VLDL CHOLESTEROL CALC: 29 MG/DL (ref 5–40)
SODIUM SERPL-SCNC: 139 MMOL/L (ref 134–144)
SP GR UR: 1.01 (ref 1–1.03)
TRIGL SERPL-MCNC: 170 MG/DL (ref 0–149)
TSH SERPL DL<=0.005 MIU/L-ACNC: 2.5 UIU/ML (ref 0.45–4.5)
UROBILINOGEN UR STRIP-ACNC: 0.2 MG/DL (ref 0.2–1)
WBC # BLD AUTO: 5.5 X10E3/UL (ref 3.4–10.8)

## 2025-05-19 PROBLEM — R97.20 ELEVATED PSA: Status: ACTIVE | Noted: 2025-05-19

## 2025-05-19 NOTE — ASSESSMENT & PLAN NOTE
Patient with a history of a fluctuating and elevated PSA.    Multiparametric MRI of the prostate in May 2024 with a PI-RADS 2 and prostate size calculated at 68 g, prostate density calculated at 0.07.    Plan to follow up in 1 year with repeat PSA.     Lab Results   Component Value Date    PSA 4.9 (H) 05/16/2025    PSA 4.0 11/13/2024    PSA 5.6 (H) 04/11/2024      Orders:    PSA Total, Diagnostic; Future

## 2025-05-19 NOTE — PROGRESS NOTES
Name: Johnny Hernandez      : 1970      MRN: [id number]  Encounter Provider: CHICHI Newsome  Encounter Date: 2025   Encounter department: [facility] [place]  :  Assessment & Plan  Elevated PSA  Patient with a history of a fluctuating and elevated PSA.    Multiparametric MRI of the prostate in May 2024 with a PI-RADS 2 and prostate size calculated at 68 g, prostate density calculated at 0.07.    Plan to follow up in 1 year with repeat PSA.     Lab Results   Component Value Date    PSA 4.9 (H) 2025    PSA 4.0 2024    PSA 5.6 (H) 2024      Orders:    PSA Total, Diagnostic; Future    Benign prostatic hyperplasia with nocturia  Patient currently managed on 5 mg low-dose daily Cialis with good benefit.  He states that he will occasionally have issues with urinary hesitancy but is not overly bothered by this.  We did discuss doing dual combination therapy with an alpha blockade such as tamsulosin but he wishes to defer at this time.  Continue with diet lifestyle modification such as limiting bladder irritants and stopping fluid consumption 2 hours prior to bedtime.  Instructed him to call our office with any progression/worsening of symptoms.  Plan to follow-up in 1 year.  Orders:    POCT Measure PVR    Acute left-sided low back pain without sciatica  Patient states that he developed an acute onset of left flank/back pain approximately 8 days ago.  There is a strong family history of kidney stones with his son and father.  He did have a kidney and bladder ultrasound in  that did not demonstrate any nephrolithiasis.  I did discuss with him that if the pain or discomfort starts to worsen to reach back out to the office and we can order imaging to assess for any ureteral calculi.         History of Present Illness   Johnny Hernandez is a 54 y.o. male who presents today to the office for follow-up of an elevated PSA.  He was last seen in November  2024.    Today in the office he states that he is overall doing very well.  He reports that he will occasionally have some urinary hesitancy but this is not overly bothersome to him.  He states that he continues to take the low-dose daily Cialis which is very helpful for his nocturia.    He reports that approximately 8 days ago he started to develop some left back pain and is concerned that it could potentially be a kidney stone.  He thinks that it is more so musculoskeletal in nature due to the fact that when he changes position he will have relief in the pain.  There is a strong family history of kidney stones and his son and father.    He denies any strong family history of prostate cancer.    Review of Systems   Constitutional:  Negative for chills and fever.   Respiratory: Negative.  Negative for cough and shortness of breath.    Cardiovascular: Negative.  Negative for chest pain.   Gastrointestinal: Negative.  Negative for abdominal distention, abdominal pain, nausea and vomiting.   Genitourinary:  Negative for decreased urine volume, difficulty urinating, dysuria, flank pain, frequency, hematuria, penile discharge, penile pain, penile swelling, scrotal swelling, testicular pain and urgency.   Musculoskeletal:  Positive for back pain.   Skin: Negative.  Negative for rash.   Neurological: Negative.    Hematological:  Negative for adenopathy. Does not bruise/bleed easily.          Objective   There were no vitals taken for this visit.    Physical Exam  Vitals reviewed.   Constitutional:       Appearance: Normal appearance.   HENT:      Head: Normocephalic and atraumatic.     Eyes:      Pupils: Pupils are equal, round, and reactive to light.       Cardiovascular:      Rate and Rhythm: Normal rate.   Pulmonary:      Effort: Pulmonary effort is normal.   Abdominal:      General: Abdomen is flat.      Palpations: Abdomen is soft.     Musculoskeletal:      Cervical back: Normal range of motion.     Skin:     General:  Skin is warm and dry.     Neurological:      General: No focal deficit present.      Mental Status: He is alert and oriented to person, place, and time.     Psychiatric:         Mood and Affect: Mood normal.         Behavior: Behavior normal.         Thought Content: Thought content normal.         Judgment: Judgment normal.           Results   Lab Results   Component Value Date    PSA 4.9 (H) 05/16/2025    PSA 4.0 11/13/2024    PSA 5.6 (H) 04/11/2024     Lab Results   Component Value Date    GLUCOSE 121 (H) 07/27/2017    CALCIUM 9.6 07/27/2017     07/27/2017    K 4.5 05/16/2025    CO2 20 05/16/2025     05/16/2025    BUN 16 05/16/2025    CREATININE 1.12 05/16/2025     Lab Results   Component Value Date    WBC 5.5 05/16/2025    HGB 14.1 05/16/2025    HCT 43.2 05/16/2025    MCV 88 05/16/2025     05/16/2025       Office Urine Dip  No results found for this or any previous visit (from the past hour).

## 2025-05-22 ENCOUNTER — OFFICE VISIT (OUTPATIENT)
Dept: UROLOGY | Facility: AMBULATORY SURGERY CENTER | Age: 55
End: 2025-05-22
Payer: COMMERCIAL

## 2025-05-22 VITALS
BODY MASS INDEX: 35.97 KG/M2 | HEART RATE: 96 BPM | WEIGHT: 265.6 LBS | OXYGEN SATURATION: 99 % | DIASTOLIC BLOOD PRESSURE: 80 MMHG | SYSTOLIC BLOOD PRESSURE: 138 MMHG | HEIGHT: 72 IN

## 2025-05-22 DIAGNOSIS — M54.50 ACUTE LEFT-SIDED LOW BACK PAIN WITHOUT SCIATICA: ICD-10-CM

## 2025-05-22 DIAGNOSIS — R35.1 BENIGN PROSTATIC HYPERPLASIA WITH NOCTURIA: Primary | ICD-10-CM

## 2025-05-22 DIAGNOSIS — R97.20 ELEVATED PSA: ICD-10-CM

## 2025-05-22 DIAGNOSIS — R39.12 WEAK URINARY STREAM: ICD-10-CM

## 2025-05-22 DIAGNOSIS — N40.1 BENIGN PROSTATIC HYPERPLASIA WITH NOCTURIA: Primary | ICD-10-CM

## 2025-05-22 LAB — POST-VOID RESIDUAL VOLUME, ML POC: 13 ML

## 2025-05-22 PROCEDURE — 51798 US URINE CAPACITY MEASURE: CPT

## 2025-05-22 PROCEDURE — 99214 OFFICE O/P EST MOD 30 MIN: CPT

## 2025-05-22 NOTE — ASSESSMENT & PLAN NOTE
Patient currently managed on 5 mg low-dose daily Cialis with good benefit.  He states that he will occasionally have issues with urinary hesitancy but is not overly bothered by this.  We did discuss doing dual combination therapy with an alpha blockade such as tamsulosin but he wishes to defer at this time.  Continue with diet lifestyle modification such as limiting bladder irritants and stopping fluid consumption 2 hours prior to bedtime.  Instructed him to call our office with any progression/worsening of symptoms.  Plan to follow-up in 1 year.  Orders:    POCT Measure PVR

## 2025-05-22 NOTE — ASSESSMENT & PLAN NOTE
Patient states that he developed an acute onset of left flank/back pain approximately 8 days ago.  There is a strong family history of kidney stones with his son and father.  He did have a kidney and bladder ultrasound in 2021 that did not demonstrate any nephrolithiasis.  I did discuss with him that if the pain or discomfort starts to worsen to reach back out to the office and we can order imaging to assess for any ureteral calculi.

## 2025-06-02 DIAGNOSIS — F43.22 ADJUSTMENT DISORDER WITH ANXIETY: ICD-10-CM

## 2025-06-02 RX ORDER — BUSPIRONE HYDROCHLORIDE 15 MG/1
15 TABLET ORAL 2 TIMES DAILY
Qty: 60 TABLET | Refills: 5 | Status: SHIPPED | OUTPATIENT
Start: 2025-06-02

## 2025-06-04 ENCOUNTER — TELEPHONE (OUTPATIENT)
Age: 55
End: 2025-06-04

## 2025-06-04 ENCOUNTER — OFFICE VISIT (OUTPATIENT)
Dept: FAMILY MEDICINE CLINIC | Facility: CLINIC | Age: 55
End: 2025-06-04
Payer: COMMERCIAL

## 2025-06-04 VITALS
TEMPERATURE: 97.7 F | DIASTOLIC BLOOD PRESSURE: 84 MMHG | RESPIRATION RATE: 15 BRPM | HEIGHT: 71 IN | BODY MASS INDEX: 36.75 KG/M2 | HEART RATE: 76 BPM | WEIGHT: 262.5 LBS | SYSTOLIC BLOOD PRESSURE: 134 MMHG | OXYGEN SATURATION: 97 %

## 2025-06-04 DIAGNOSIS — E78.2 MIXED HYPERLIPIDEMIA: ICD-10-CM

## 2025-06-04 DIAGNOSIS — Z12.11 COLON CANCER SCREENING: ICD-10-CM

## 2025-06-04 DIAGNOSIS — Z82.49 FAMILY HISTORY OF CORONARY ARTERY DISEASE: ICD-10-CM

## 2025-06-04 DIAGNOSIS — I10 ESSENTIAL HYPERTENSION: ICD-10-CM

## 2025-06-04 DIAGNOSIS — R93.1 ELEVATED CORONARY ARTERY CALCIUM SCORE: ICD-10-CM

## 2025-06-04 DIAGNOSIS — Z00.00 ANNUAL PHYSICAL EXAM: Primary | ICD-10-CM

## 2025-06-04 DIAGNOSIS — R73.03 PREDIABETES: ICD-10-CM

## 2025-06-04 DIAGNOSIS — E66.01 SEVERE OBESITY (BMI 35.0-39.9) WITH COMORBIDITY (HCC): ICD-10-CM

## 2025-06-04 PROCEDURE — 99396 PREV VISIT EST AGE 40-64: CPT | Performed by: NURSE PRACTITIONER

## 2025-06-04 PROCEDURE — 99214 OFFICE O/P EST MOD 30 MIN: CPT | Performed by: NURSE PRACTITIONER

## 2025-06-04 RX ORDER — TIRZEPATIDE 2.5 MG/.5ML
2.5 INJECTION, SOLUTION SUBCUTANEOUS WEEKLY
Qty: 2 ML | Refills: 0 | Status: SHIPPED | OUTPATIENT
Start: 2025-06-04 | End: 2025-06-05

## 2025-06-04 NOTE — TELEPHONE ENCOUNTER
PA for Zepbound 2.5 MG/0.5ML auto-injector EXCLUDED from plan       Reason:(Screenshot if applicable)        Message sent to office clinical pool Yes

## 2025-06-04 NOTE — ASSESSMENT & PLAN NOTE
Orders:    Zepbound 2.5 MG/0.5ML auto-injector; Inject 0.5 mL (2.5 mg total) under the skin once a week    BP elevated today. Will have patient check 2-3x per week and report BP readings in 1 month or sooner PRN.

## 2025-06-04 NOTE — ASSESSMENT & PLAN NOTE
Orders:    Zepbound 2.5 MG/0.5ML auto-injector; Inject 0.5 mL (2.5 mg total) under the skin once a week

## 2025-06-04 NOTE — ASSESSMENT & PLAN NOTE
Orders:    Hemoglobin A1C; Future    A1c at 6.3%. Recheck in 6 months.   Patient is getting volumes greater than 1000 ml. Handoff to nurses.

## 2025-06-04 NOTE — PROGRESS NOTES
Pt declining Shingrix.  Labs UTD.  Colonoscopy UTD, but due later this year.  Recheck in 3-6 months pending if Zepbound is approved. If it is, we will see Johnny back in 3 months for weight recheck.    Adult Annual Physical  Name: Johnny Hernandez      : 1970      MRN: [id number]  Encounter Provider: [facility]  Encounter Date: 2025   Encounter department: [facility]    :  Assessment & Plan  Severe obesity (BMI 35.0-39.9) with comorbidity (HCC)      Orders:    Zepbound 2.5 MG/0.5ML auto-injector; Inject 0.5 mL (2.5 mg total) under the skin once a week    Zepbound re-prescribed. Pt made aware that if the insurance does not cover this, we could consider using [facility] pharmacy and see what the cash price is. Patient is encouraged to call our office for any questions/concerns, persistent or worsening symptoms. Patient states they understand and agree with treatment plan.   Essential hypertension    Orders:    Zepbound 2.5 MG/0.5ML auto-injector; Inject 0.5 mL (2.5 mg total) under the skin once a week    BP elevated today. Will have patient check 2-3x per week and report BP readings in 1 month or sooner PRN.  Mixed hyperlipidemia    Orders:    Zepbound 2.5 MG/0.5ML auto-injector; Inject 0.5 mL (2.5 mg total) under the skin once a week    Lipid Panel with Direct LDL reflex; Future    Hepatic function panel; Future    Managed w/ Fenofibrate and Rosuvastatin. Continue same.  Family history of coronary artery disease    Orders:    Zepbound 2.5 MG/0.5ML auto-injector; Inject 0.5 mL (2.5 mg total) under the skin once a week    Elevated coronary artery calcium score    Orders:    Zepbound 2.5 MG/0.5ML auto-injector; Inject 0.5 mL (2.5 mg total) under the skin once a week    Annual physical exam         Prediabetes    Orders:    Hemoglobin A1C; Future    A1c at 6.3%. Recheck in 6 months.  Colon cancer screening    Orders:    Ambulatory Referral to  "Gastroenterology; Future        Preventive Screenings:  - Diabetes Screening: screening up-to-date  - Cholesterol Screening: screening not indicated and has hyperlipidemia   - Hepatitis C screening: screening up-to-date   - Colon cancer screening: screening up-to-date   - Lung cancer screening: screening not indicated   - Prostate cancer screening: screening up-to-date     Counseling/Anticipatory Guidance:    - Diet: discussed recommendations for a healthy/well-balanced diet.   - Exercise: the importance of regular exercise/physical activity was discussed. Recommend exercise 3-5 times per week for at least 30 minutes.          History of Present Illness     Adult Annual Physical:  Patient presents for annual physical.     Diet and Physical Activity:  - Diet/Nutrition: well balanced diet and consuming 3-5 servings of fruits/vegetables daily.  - Exercise: moderate cardiovascular exercise.    Depression Screening:  - PHQ-2 Score: 0    General Health:  - Sleep: sleeps well and 7-8 hours of sleep on average.  - Hearing: normal hearing bilateral ears.  - Vision: most recent eye exam < 1 year ago and wears glasses.  - Dental: brushes teeth twice daily and regular dental visits.     Health:    - Urinary symptoms: none.     Review of Systems   Constitutional: Negative.    HENT: Negative.     Respiratory: Negative.  Negative for cough.    Cardiovascular: Negative.    Gastrointestinal: Negative.    Genitourinary: Negative.    Musculoskeletal: Negative.  Negative for myalgias.   Neurological: Negative.    Psychiatric/Behavioral: Negative.           Objective   /84   Pulse 76   Temp 97.7 °F (36.5 °C)   Resp 15   Ht 5' 11\" (1.803 m)   Wt 119 kg (262 lb 8 oz)   SpO2 97%   BMI 36.61 kg/m²     Physical Exam  Vitals and nursing note reviewed.   Constitutional:       General: He is not in acute distress.     Appearance: He is well-developed.   HENT:      Head: Normocephalic and atraumatic.      Right Ear: Tympanic " membrane, ear canal and external ear normal.      Left Ear: Tympanic membrane, ear canal and external ear normal.     Eyes:      Conjunctiva/sclera: Conjunctivae normal.     Neck:      Vascular: No carotid bruit.     Cardiovascular:      Rate and Rhythm: Normal rate and regular rhythm.      Pulses: Normal pulses.      Heart sounds: Normal heart sounds. No murmur heard.  Pulmonary:      Effort: Pulmonary effort is normal. No respiratory distress.      Breath sounds: Normal breath sounds.   Abdominal:      Palpations: Abdomen is soft.      Tenderness: There is no abdominal tenderness.     Musculoskeletal:         General: No swelling.      Cervical back: Neck supple.      Right lower leg: No edema.      Left lower leg: No edema.   Lymphadenopathy:      Cervical: No cervical adenopathy.     Skin:     General: Skin is warm and dry.      Capillary Refill: Capillary refill takes less than 2 seconds.     Neurological:      Mental Status: He is alert and oriented to person, place, and time. Mental status is at baseline.     Psychiatric:         Mood and Affect: Mood normal.         Behavior: Behavior normal.         Thought Content: Thought content normal.         Judgment: Judgment normal.

## 2025-06-04 NOTE — ASSESSMENT & PLAN NOTE
Orders:    Zepbound 2.5 MG/0.5ML auto-injector; Inject 0.5 mL (2.5 mg total) under the skin once a week    Zepbound re-prescribed. Pt made aware that if the insurance does not cover this, we could consider using [facility] pharmacy and see what the cash price is. Patient is encouraged to call our office for any questions/concerns, persistent or worsening symptoms. Patient states they understand and agree with treatment plan.

## 2025-06-04 NOTE — ASSESSMENT & PLAN NOTE
Orders:    Zepbound 2.5 MG/0.5ML auto-injector; Inject 0.5 mL (2.5 mg total) under the skin once a week    Lipid Panel with Direct LDL reflex; Future    Hepatic function panel; Future    Managed w/ Fenofibrate and Rosuvastatin. Continue same.

## 2025-06-04 NOTE — TELEPHONE ENCOUNTER
PA for Zepbound 2.5 MG/0.5ML auto-injector SUBMITTED to [employer]    via    []CMM-KEY:   [x][employer]-Case ID # [id number]   [][employer]-Auth ID # NDC #   []Faxed to plan   []Other website   []Phone call Case ID #     [x]PA sent as URGENT    All office notes, labs and other pertaining documents and studies sent. Clinical questions answered. Awaiting determination from insurance company.     Turnaround time for your insurance to make a decision on your Prior Authorization can take 7-21 business days.

## 2025-06-05 DIAGNOSIS — E78.2 MIXED HYPERLIPIDEMIA: ICD-10-CM

## 2025-06-05 DIAGNOSIS — Z82.49 FAMILY HISTORY OF CORONARY ARTERY DISEASE: ICD-10-CM

## 2025-06-05 DIAGNOSIS — I10 ESSENTIAL HYPERTENSION: ICD-10-CM

## 2025-06-05 DIAGNOSIS — E66.01 SEVERE OBESITY (BMI 35.0-39.9) WITH COMORBIDITY (HCC): Primary | ICD-10-CM

## 2025-06-05 DIAGNOSIS — R93.1 ELEVATED CORONARY ARTERY CALCIUM SCORE: ICD-10-CM

## 2025-06-05 RX ORDER — TIRZEPATIDE 2.5 MG/.5ML
2.5 INJECTION, SOLUTION SUBCUTANEOUS WEEKLY
Qty: 2 ML | Refills: 0 | Status: SHIPPED | OUTPATIENT
Start: 2025-06-05

## 2025-06-13 DIAGNOSIS — I10 ESSENTIAL HYPERTENSION: ICD-10-CM

## 2025-06-14 RX ORDER — HYDROCHLOROTHIAZIDE 25 MG/1
25 TABLET ORAL DAILY
Qty: 30 TABLET | Refills: 0 | Status: SHIPPED | OUTPATIENT
Start: 2025-06-14

## 2025-06-26 DIAGNOSIS — R93.1 ELEVATED CORONARY ARTERY CALCIUM SCORE: ICD-10-CM

## 2025-06-26 DIAGNOSIS — E66.01 SEVERE OBESITY (BMI 35.0-39.9) WITH COMORBIDITY (HCC): ICD-10-CM

## 2025-06-26 DIAGNOSIS — E78.2 MIXED HYPERLIPIDEMIA: ICD-10-CM

## 2025-06-26 DIAGNOSIS — Z82.49 FAMILY HISTORY OF CORONARY ARTERY DISEASE: ICD-10-CM

## 2025-06-26 DIAGNOSIS — I10 ESSENTIAL HYPERTENSION: ICD-10-CM

## 2025-06-26 RX ORDER — TIRZEPATIDE 2.5 MG/.5ML
INJECTION, SOLUTION SUBCUTANEOUS
Qty: 2 ML | Refills: 3 | Status: SHIPPED | OUTPATIENT
Start: 2025-06-26

## 2025-07-09 DIAGNOSIS — R35.0 URINARY FREQUENCY: ICD-10-CM

## 2025-07-09 DIAGNOSIS — N52.9 ERECTILE DYSFUNCTION, UNSPECIFIED ERECTILE DYSFUNCTION TYPE: ICD-10-CM

## 2025-07-09 RX ORDER — TADALAFIL 5 MG/1
5 TABLET ORAL DAILY
Qty: 90 TABLET | Refills: 1 | Status: SHIPPED | OUTPATIENT
Start: 2025-07-09

## 2025-07-13 DIAGNOSIS — I10 ESSENTIAL HYPERTENSION: ICD-10-CM

## 2025-07-14 RX ORDER — HYDROCHLOROTHIAZIDE 25 MG/1
25 TABLET ORAL DAILY
Qty: 30 TABLET | Refills: 5 | Status: SHIPPED | OUTPATIENT
Start: 2025-07-14

## 2025-08-06 DIAGNOSIS — F41.9 ANXIETY AND DEPRESSION: ICD-10-CM

## 2025-08-06 DIAGNOSIS — I10 ESSENTIAL HYPERTENSION: ICD-10-CM

## 2025-08-06 DIAGNOSIS — F32.A ANXIETY AND DEPRESSION: ICD-10-CM

## 2025-08-06 DIAGNOSIS — K21.00 GASTROESOPHAGEAL REFLUX DISEASE WITH ESOPHAGITIS WITHOUT HEMORRHAGE: ICD-10-CM

## 2025-08-07 RX ORDER — PANTOPRAZOLE SODIUM 40 MG/1
40 TABLET, DELAYED RELEASE ORAL DAILY
Qty: 30 TABLET | Refills: 5 | Status: SHIPPED | OUTPATIENT
Start: 2025-08-07

## 2025-08-07 RX ORDER — BUPROPION HYDROCHLORIDE 150 MG/1
150 TABLET ORAL EVERY MORNING
Qty: 30 TABLET | Refills: 5 | Status: SHIPPED | OUTPATIENT
Start: 2025-08-07

## 2025-08-07 RX ORDER — LOSARTAN POTASSIUM 100 MG/1
100 TABLET ORAL DAILY
Qty: 30 TABLET | Refills: 5 | Status: SHIPPED | OUTPATIENT
Start: 2025-08-07

## 2025-08-19 ENCOUNTER — TELEPHONE (OUTPATIENT)
Dept: DERMATOLOGY | Facility: CLINIC | Age: 55
End: 2025-08-19